# Patient Record
Sex: MALE | Race: BLACK OR AFRICAN AMERICAN | NOT HISPANIC OR LATINO | Employment: FULL TIME | URBAN - METROPOLITAN AREA
[De-identification: names, ages, dates, MRNs, and addresses within clinical notes are randomized per-mention and may not be internally consistent; named-entity substitution may affect disease eponyms.]

---

## 2017-10-21 ENCOUNTER — HOSPITAL ENCOUNTER (EMERGENCY)
Facility: HOSPITAL | Age: 47
Discharge: HOME | End: 2017-10-21
Attending: EMERGENCY MEDICINE
Payer: COMMERCIAL

## 2017-10-21 VITALS
BODY MASS INDEX: 34.98 KG/M2 | HEIGHT: 75 IN | OXYGEN SATURATION: 100 % | RESPIRATION RATE: 18 BRPM | SYSTOLIC BLOOD PRESSURE: 142 MMHG | WEIGHT: 281.31 LBS | DIASTOLIC BLOOD PRESSURE: 83 MMHG | TEMPERATURE: 98 F | HEART RATE: 81 BPM

## 2017-10-21 DIAGNOSIS — S80.812A ABRASION OF LEFT CALF, INITIAL ENCOUNTER: ICD-10-CM

## 2017-10-21 DIAGNOSIS — S90.812A ABRASION OF LEFT FOOT, INITIAL ENCOUNTER: Primary | ICD-10-CM

## 2017-10-21 PROCEDURE — 25000003 PHARM REV CODE 250: Performed by: EMERGENCY MEDICINE

## 2017-10-21 PROCEDURE — 99283 EMERGENCY DEPT VISIT LOW MDM: CPT

## 2017-10-21 RX ORDER — MUPIROCIN 20 MG/G
OINTMENT TOPICAL 3 TIMES DAILY
Qty: 1 TUBE | Refills: 0 | Status: SHIPPED | OUTPATIENT
Start: 2017-10-21 | End: 2017-10-31

## 2017-10-21 RX ORDER — LOSARTAN POTASSIUM AND HYDROCHLOROTHIAZIDE 12.5; 5 MG/1; MG/1
1 TABLET ORAL DAILY
COMMUNITY
Start: 2017-10-03 | End: 2018-10-03

## 2017-10-21 RX ORDER — GLIPIZIDE 5 MG/1
5 TABLET, FILM COATED, EXTENDED RELEASE ORAL DAILY
COMMUNITY
Start: 2017-09-01

## 2017-10-21 RX ORDER — PRAVASTATIN SODIUM 20 MG/1
20 TABLET ORAL DAILY
COMMUNITY
Start: 2017-02-14

## 2017-10-21 RX ORDER — METFORMIN HYDROCHLORIDE 750 MG/1
1500 TABLET, EXTENDED RELEASE ORAL
Status: ON HOLD | COMMUNITY
Start: 2017-02-14 | End: 2022-11-18 | Stop reason: HOSPADM

## 2017-10-21 RX ORDER — MUPIROCIN 20 MG/G
OINTMENT TOPICAL
Status: COMPLETED | OUTPATIENT
Start: 2017-10-21 | End: 2017-10-21

## 2017-10-21 RX ORDER — CALCIUM CITRATE/VITAMIN D3 315MG-6.25
1 TABLET ORAL DAILY
COMMUNITY

## 2017-10-21 RX ORDER — OMEPRAZOLE 20 MG/1
1 CAPSULE, DELAYED RELEASE ORAL DAILY
COMMUNITY
Start: 2017-07-28

## 2017-10-21 RX ORDER — GABAPENTIN 300 MG/1
400 CAPSULE ORAL NIGHTLY
COMMUNITY
Start: 2017-10-13

## 2017-10-21 RX ADMIN — MUPIROCIN: 20 OINTMENT TOPICAL at 04:10

## 2017-10-21 NOTE — ED PROVIDER NOTES
"SCRIBE #1 NOTE: I, Dana Colorado, am scribing for, and in the presence of, Michael Aguirre Jr., MD. I have scribed the entire note.      History      Chief Complaint   Patient presents with    Foot Injury     pt reports he pulled off some skin on L foot and his foot wont stop bleeding       Review of patient's allergies indicates:   Allergen Reactions    Lisinopril Swelling    Penicillins Other (See Comments)     Pt unsure of reaction, stating "I just know I'm allergic"        HPI   HPI    10/21/2017, 4:12 AM   History obtained from the patient      History of Present Illness: Joseph Paris Jr. is a 47 y.o. male patient with a PMHx of DM who presents to the Emergency Department for LLE injury which onset gradually thirty minutes pta. Symptoms are intermittent and moderate in severity. No mitigating or exacerbating factors reported. No associated sxs reported. Patient denies any fever, chills, n/v, SOB, extremity weakness/numbness, and all other sxs at this time. No prior Tx reported. No further complaints or concerns at this time.         Arrival mode: Personal vehicle    PCP: Thong Hidalgo Jr, MD       Past Medical History:  Past Medical History:   Diagnosis Date    Coronary artery disease     Diabetes mellitus     Esophageal cancer     Hypertension        Past Surgical History:  Past Surgical History:   Procedure Laterality Date    CORONARY ARTERY BYPASS GRAFT      FEMORAL BYPASS           Family History:  Family History   Problem Relation Age of Onset    Heart disease Mother     Stroke Mother     Cancer Father     Diabetes Sister     Diabetes Brother        Social History:  Social History     Social History Main Topics    Smoking status: Former Smoker    Smokeless tobacco: Never Used    Alcohol use Yes    Drug use: No    Sexual activity: Unknown       ROS   Review of Systems   Constitutional: Negative for chills, diaphoresis and fever.   HENT: Negative for sore throat.    Respiratory: " Negative for shortness of breath.    Cardiovascular: Negative for chest pain.   Gastrointestinal: Negative for nausea and vomiting.   Genitourinary: Negative for dysuria.   Musculoskeletal: Negative for back pain.        (+) LLE injury   Skin: Negative for rash.   Neurological: Negative for weakness and numbness.   Hematological: Does not bruise/bleed easily.   All other systems reviewed and are negative.    Physical Exam      Initial Vitals [10/21/17 0356]   BP Pulse Resp Temp SpO2   (!) 142/95 86 20 98 °F (36.7 °C) 98 %      MAP       110.67          Physical Exam  Nursing Notes and Vital Signs Reviewed.  Constitutional: Patient is in no acute distress. Well-developed and well-nourished.  Head: Atraumatic. Normocephalic.  Eyes: PERRL. EOM intact. Conjunctivae are not pale. No scleral icterus.  ENT: Mucous membranes are moist. Oropharynx is clear and symmetric.    Neck: Supple. Full ROM. No lymphadenopathy.  Cardiovascular: Regular rate. Regular rhythm. No murmurs, rubs, or gallops. Distal pulses are 2+ and symmetric.  Pulmonary/Chest: No respiratory distress. Clear to auscultation bilaterally. No wheezing, rales, or rhonchi.  Abdominal: Soft and non-distended.  There is no tenderness.  No rebound, guarding, or rigidity. Good bowel sounds.  Genitourinary: No CVA tenderness  Musculoskeletal: Moves all extremities. No obvious deformities. No edema. No calf tenderness.  Skin: Warm and dry. Multiple superficial abrasions noted to great toe/sole of L foot. No active bleeding. Abrasions noted to the anterior aspect of LLE secondary to scratching. No areas of edema. No palpable areas of fluctuance.   Neurological:  Alert, awake, and appropriate.  Normal speech.  No acute focal neurological deficits are appreciated.  Psychiatric: Normal affect. Good eye contact. Appropriate in content.    ED Course    Procedures  ED Vital Signs:  Vitals:    10/21/17 0356 10/21/17 0501   BP: (!) 142/95 (!) 142/83   Pulse: 86 81   Resp: 20  "18   Temp: 98 °F (36.7 °C) 98.1 °F (36.7 °C)   TempSrc: Oral Oral   SpO2: 98% 100%   Weight: 127.6 kg (281 lb 4.9 oz)    Height: 6' 3" (1.905 m)             The Emergency Provider reviewed the vital signs and test results, which are outlined above.    ED Discussion     4:30 AM: Reassessed pt at this time.  Pt states his condition has improved at this time. Discussed with pt all pertinent ED information and results. Discussed pt dx and plan of tx. Gave pt all f/u and return to the ED instructions. All questions and concerns were addressed at this time. Pt expresses understanding of information and instructions, and is comfortable with plan to discharge. Pt is stable for discharge.    I discussed wound care precautions with patient and/or family/caretaker; specifically that all wounds have risk of infection despite efforts to cleanse and debride the wound; and there is a risk of an occult foreign body (and thus increased risk of infection) despite a negative examination.  I discussed with patient need to return for any signs of infection, specifically redness, increased pain, fever, drainage of pus, or any concern, immediately.      ED Medication(s):  Medications   mupirocin 2 % ointment ( Topical (Top) Given 10/21/17 0453)       Discharge Medication List as of 10/21/2017  4:33 AM      START taking these medications    Details   mupirocin (BACTROBAN) 2 % ointment Apply topically 3 (three) times daily. Apply to affected area, Starting Sat 10/21/2017, Until Tue 10/31/2017, Print             Follow-up Information     Thong Hidalgo Jr, MD. Schedule an appointment as soon as possible for a visit in 1 week.    Specialty:  Internal Medicine  Contact information:  [address]  [address]  [facility] [address] [phone]             [facility] - .    Specialty:  Emergency Medicine  Why:  As needed, If symptoms worsen  Contact information:  [address]  [facility] " [address]  [phone]                   Medical Decision Making              Scribe Attestation:   Scribe #1: I performed the above scribed service and the documentation accurately describes the services I performed. I attest to the accuracy of the note.    Attending:   Physician Attestation Statement for Scribe #1: I, Michael Aguirre Jr., MD, personally performed the services described in this documentation, as scribed by Dana Colorado, in my presence, and it is both accurate and complete.          Clinical Impression       ICD-10-CM ICD-9-CM   1. Abrasion of left foot, initial encounter S90.812A 917.0   2. Abrasion of left calf, initial encounter S80.812A 916.0       Disposition:   Disposition: Discharged  Condition: Stable         Michael Aguirre Jr., MD  10/22/17 0255

## 2017-10-21 NOTE — DISCHARGE INSTRUCTIONS
I discussed wound care precautions; specifically, that all wounds have risk of infection despite efforts to cleanse and debride the wound; and there is a risk of an occult foreign body (and thus increased risk of infection) despite a negative examination.  I discussed with patient need to return for any signs of infection, specifically redness, increased pain, fever, drainage of pus, or any concern, immediately.

## 2017-10-21 NOTE — ED NOTES
Wounds cleaned, ointment placed, and wounds bandaged. Wound care taught to pt, pt verbalized and demonstrated understanding.

## 2018-07-20 ENCOUNTER — HOSPITAL ENCOUNTER (EMERGENCY)
Facility: HOSPITAL | Age: 48
Discharge: HOME | End: 2018-07-20
Payer: COMMERCIAL

## 2018-07-20 VITALS
WEIGHT: 305.44 LBS | SYSTOLIC BLOOD PRESSURE: 144 MMHG | TEMPERATURE: 98 F | HEART RATE: 88 BPM | HEIGHT: 76 IN | RESPIRATION RATE: 20 BRPM | OXYGEN SATURATION: 96 % | DIASTOLIC BLOOD PRESSURE: 78 MMHG | BODY MASS INDEX: 37.2 KG/M2

## 2018-07-20 DIAGNOSIS — L02.212 CUTANEOUS ABSCESS OF BACK EXCLUDING BUTTOCKS: Primary | ICD-10-CM

## 2018-07-20 PROCEDURE — 10060 I&D ABSCESS SIMPLE/SINGLE: CPT

## 2018-07-20 PROCEDURE — 99283 EMERGENCY DEPT VISIT LOW MDM: CPT | Mod: 25

## 2018-07-20 PROCEDURE — 25000003 PHARM REV CODE 250: Performed by: PHYSICIAN ASSISTANT

## 2018-07-20 RX ORDER — CEFUROXIME AXETIL 500 MG/1
500 TABLET ORAL 2 TIMES DAILY
Qty: 14 TABLET | Refills: 0 | Status: SHIPPED | OUTPATIENT
Start: 2018-07-20 | End: 2018-07-30

## 2018-07-20 RX ORDER — SULFAMETHOXAZOLE AND TRIMETHOPRIM 800; 160 MG/1; MG/1
2 TABLET ORAL 2 TIMES DAILY
Qty: 40 TABLET | Refills: 0 | Status: SHIPPED | OUTPATIENT
Start: 2018-07-20 | End: 2018-07-30

## 2018-07-20 RX ORDER — LIDOCAINE HYDROCHLORIDE 10 MG/ML
10 INJECTION, SOLUTION EPIDURAL; INFILTRATION; INTRACAUDAL; PERINEURAL
Status: COMPLETED | OUTPATIENT
Start: 2018-07-20 | End: 2018-07-20

## 2018-07-20 RX ORDER — LIDOCAINE HYDROCHLORIDE AND EPINEPHRINE 10; 10 MG/ML; UG/ML
10 INJECTION, SOLUTION INFILTRATION; PERINEURAL
Status: COMPLETED | OUTPATIENT
Start: 2018-07-20 | End: 2018-07-20

## 2018-07-20 RX ADMIN — LIDOCAINE HYDROCHLORIDE,EPINEPHRINE BITARTRATE 10 ML: 10; .01 INJECTION, SOLUTION INFILTRATION; PERINEURAL at 09:07

## 2018-07-20 RX ADMIN — LIDOCAINE HYDROCHLORIDE 100 MG: 10 INJECTION, SOLUTION EPIDURAL; INFILTRATION; INTRACAUDAL; PERINEURAL at 09:07

## 2018-07-20 RX ADMIN — NEOMYCIN AND POLYMYXIN B SULFATES AND BACITRACIN ZINC: 400; 3.5; 5 OINTMENT TOPICAL at 09:07

## 2018-07-21 NOTE — ED PROVIDER NOTES
"SCRIBE #1 NOTE: I, Aida Vazquez, am scribing for, and in the presence of, ALBINO Toscano. I have scribed the entire note.      History      Chief Complaint   Patient presents with    Abscess     sacral       Review of patient's allergies indicates:   Allergen Reactions    Lisinopril Swelling    Penicillins Other (See Comments)     Pt unsure of reaction, stating "I just know I'm allergic"        HPI   HPI    7/20/2018, 9:32 PM   History obtained from the patient      History of Present Illness: Joseph Paris Jr. is a 47 y.o. male patient who presents to the Emergency Department for abscess to sacral which onset gradually 3-4 days ago. Symptoms are constant and moderate in severity. Pt denies having similar sxs in the past. No mitigating or exacerbating factors reported. No associated sxs at this time. Patient denies any fever, chills, n/v/d, abd pain, CP, SOB, and all other sxs at this time. No prior Tx. No further complaints or concerns at this time.         Arrival mode: Personal vehicle     PCP: Thong Hidalgo Jr, MD       Past Medical History:  Past Medical History:   Diagnosis Date    Coronary artery disease     Diabetes mellitus     Esophageal cancer     Hypertension        Past Surgical History:  Past Surgical History:   Procedure Laterality Date    CORONARY ARTERY BYPASS GRAFT      FEMORAL BYPASS           Family History:  Family History   Problem Relation Age of Onset    Heart disease Mother     Stroke Mother     Cancer Father     Diabetes Sister     Diabetes Brother        Social History:  Social History     Social History Main Topics    Smoking status: Former Smoker    Smokeless tobacco: Never Used    Alcohol use Yes    Drug use: No    Sexual activity: Not given       ROS   Review of Systems   Constitutional: Negative for chills and fever.   HENT: Negative for sore throat.    Respiratory: Negative for shortness of breath.    Cardiovascular: Negative for chest pain. "   Gastrointestinal: Negative for abdominal pain, diarrhea, nausea and vomiting.   Genitourinary: Negative for dysuria.   Musculoskeletal: Negative for back pain and neck pain.   Skin: Negative for rash.        (+) Abscess to sacral area   Neurological: Negative for dizziness, syncope, weakness, numbness and headaches.   Hematological: Does not bruise/bleed easily.   All other systems reviewed and are negative.      Physical Exam      Initial Vitals [07/20/18 2052]   BP Pulse Resp Temp SpO2   (!) 144/78 88 20 98.1 °F (36.7 °C) 96 %      MAP       --          Physical Exam  Nursing Notes and Vital Signs Reviewed.  Constitutional: Patient is in no acute distress. Well-developed and well-nourished.  Head: Atraumatic. Normocephalic.  Eyes: PERRL. EOM intact. Conjunctivae are not pale. No scleral icterus.  ENT: Mucous membranes are moist. Oropharynx is clear and symmetric.    Neck: Supple. Full ROM. No lymphadenopathy.  Cardiovascular: Regular rate. Regular rhythm. No murmurs, rubs, or gallops. Distal pulses are 2+ and symmetric.  Pulmonary/Chest: No respiratory distress. Clear to auscultation bilaterally. No wheezing or rales.  Abdominal: Soft and non-distended.  There is no tenderness.  No rebound, guarding, or rigidity. Good bowel sounds.  Genitourinary: No CVA tenderness  Musculoskeletal: Moves all extremities. No obvious deformities. No edema. No calf tenderness.  Skin: Warm and dry. Tender swollen area of induration and fluctuance. 6cm superior portion of R gluteal fold.  Neurological:  Alert, awake, and appropriate.  Normal speech.  No acute focal neurological deficits are appreciated.  Psychiatric: Normal affect. Good eye contact. Appropriate in content.    ED Course    I & D - Incision and Drainage  Date/Time: 7/20/2018 9:56 PM  Performed by: HOWARD COMER  Authorized by: CHRIST LIU   Type: abscess  Body area: anogenital  Location details: gluteal cleft    Anesthesia:  Local Anesthetic: lidocaine 1%  "with epinephrine  Anesthetic total: 8 mL  Scalpel size: 11  Incision type: single straight  Complexity: simple  Drainage: purulent  Drainage amount: copious  Wound treatment: incision,  drainage,  expression of material and  wound packed  Packing material: 1/2 in iodoform gauze  Complications: No  Patient tolerance: Patient tolerated the procedure well with no immediate complications        ED Vital Signs:  Vitals:    07/20/18 2052   BP: (!) 144/78   Pulse: 88   Resp: 20   Temp: 98.1 °F (36.7 °C)   TempSrc: Oral   SpO2: 96%   Weight: (!) 138.6 kg (305 lb 7.2 oz)   Height: 6' 4" (1.93 m)            The Emergency Provider reviewed the vital signs and test results, which are outlined above.    ED Discussion     10:01 PM: Reassessed pt at this time. Discussed with pt all pertinent ED information and results. Discussed pt dx and plan of tx. Gave pt all f/u and return to the ED instructions. All questions and concerns were addressed at this time. Pt expresses understanding of information and instructions, and is comfortable with plan to discharge. Pt is stable for discharge.    I discussed wound care precautions with patient and/or family/caretaker; specifically that all wounds have risk of infection despite efforts to cleanse and debride the wound; and there is a risk of an occult foreign body (and thus increased risk of infection) despite a negative examination.  I discussed with patient need to return for any signs of infection, specifically redness, increased pain, fever, drainage of pus, or any concern, immediately.      ED Medication(s):  Medications   lidocaine (PF) 10 mg/ml (1%) injection 100 mg (not administered)   lidocaine-EPINEPHrine 1%-1:100,000 injection 10 mL (not administered)   neomycin-bacitracnZn-polymyxnB packet ( Topical (Top) Given 7/20/18 2145)       New Prescriptions    CEFUROXIME (CEFTIN) 500 MG TABLET    Take 1 tablet (500 mg total) by mouth 2 (two) times daily. for 10 days    " SULFAMETHOXAZOLE-TRIMETHOPRIM 800-160MG (BACTRIM DS) 800-160 MG TAB    Take 2 tablets by mouth 2 (two) times daily. for 10 days       Follow-up Information     [facility] -  In 2 days.    Specialty:  Emergency Medicine  Why:  For wound re-check and packing removal  Contact information:  [address]  [facility] [address] [phone]                   Medical Decision Making              Scribe Attestation:   Scribe #1: I performed the above scribed service and the documentation accurately describes the services I performed. I attest to the accuracy of the note.    Attending:   Physician Attestation Statement for Scribe #1: I, ALBINO Toscano, personally performed the services described in this documentation, as scribed by Aida Vazquez, in my presence, and it is both accurate and complete.          Clinical Impression       ICD-10-CM ICD-9-CM   1. Cutaneous abscess of back excluding buttocks L02.212 682.2       Disposition:   Disposition: Discharged  Condition: Stable         ALBINO Lee  07/20/18 2209

## 2022-11-12 ENCOUNTER — HOSPITAL ENCOUNTER (INPATIENT)
Facility: HOSPITAL | Age: 52
LOS: 17 days | Discharge: HOME | DRG: 853 | End: 2022-11-29
Attending: EMERGENCY MEDICINE | Admitting: INTERNAL MEDICINE
Payer: MEDICAID

## 2022-11-12 DIAGNOSIS — R07.9 CHEST PAIN: ICD-10-CM

## 2022-11-12 DIAGNOSIS — E11.42 TYPE 2 DIABETES MELLITUS WITH DIABETIC POLYNEUROPATHY, WITHOUT LONG-TERM CURRENT USE OF INSULIN: ICD-10-CM

## 2022-11-12 DIAGNOSIS — I73.9 PVD (PERIPHERAL VASCULAR DISEASE): ICD-10-CM

## 2022-11-12 DIAGNOSIS — L08.9 LEFT FOOT INFECTION: ICD-10-CM

## 2022-11-12 DIAGNOSIS — A48.0 GAS GANGRENE OF FOOT: ICD-10-CM

## 2022-11-12 DIAGNOSIS — M86.172 ACUTE OSTEOMYELITIS OF LEFT FOOT: Primary | ICD-10-CM

## 2022-11-12 DIAGNOSIS — L02.612 ABSCESS OF FOOT INCLUDING TOES, LEFT: ICD-10-CM

## 2022-11-12 PROBLEM — R65.20 SEVERE SEPSIS: Status: ACTIVE | Noted: 2022-11-12

## 2022-11-12 PROBLEM — N17.9 AKI (ACUTE KIDNEY INJURY): Status: ACTIVE | Noted: 2022-11-12

## 2022-11-12 PROBLEM — A41.9 SEVERE SEPSIS: Status: ACTIVE | Noted: 2022-11-12

## 2022-11-12 LAB
ALBUMIN SERPL BCP-MCNC: 2.5 G/DL (ref 3.5–5.2)
ALP SERPL-CCNC: 260 U/L (ref 55–135)
ALT SERPL W/O P-5'-P-CCNC: 12 U/L (ref 10–44)
ANION GAP SERPL CALC-SCNC: 17 MMOL/L (ref 8–16)
AST SERPL-CCNC: 43 U/L (ref 10–40)
BASOPHILS # BLD AUTO: 0.08 K/UL (ref 0–0.2)
BASOPHILS NFR BLD: 0.3 % (ref 0–1.9)
BILIRUB SERPL-MCNC: 1.9 MG/DL (ref 0.1–1)
BUN SERPL-MCNC: 27 MG/DL (ref 6–20)
CALCIUM SERPL-MCNC: 9.6 MG/DL (ref 8.7–10.5)
CHLORIDE SERPL-SCNC: 91 MMOL/L (ref 95–110)
CO2 SERPL-SCNC: 23 MMOL/L (ref 23–29)
CREAT SERPL-MCNC: 2 MG/DL (ref 0.5–1.4)
CRP SERPL-MCNC: 471.2 MG/L (ref 0–8.2)
DIFFERENTIAL METHOD: ABNORMAL
EOSINOPHIL # BLD AUTO: 0 K/UL (ref 0–0.5)
EOSINOPHIL NFR BLD: 0.1 % (ref 0–8)
ERYTHROCYTE [DISTWIDTH] IN BLOOD BY AUTOMATED COUNT: 10.9 % (ref 11.5–14.5)
ERYTHROCYTE [SEDIMENTATION RATE] IN BLOOD BY WESTERGREN METHOD: 125 MM/HR (ref 0–10)
EST. GFR  (NO RACE VARIABLE): 39 ML/MIN/1.73 M^2
GLUCOSE SERPL-MCNC: 267 MG/DL (ref 70–110)
HCT VFR BLD AUTO: 34.2 % (ref 40–54)
HGB BLD-MCNC: 11.6 G/DL (ref 14–18)
HYPOCHROMIA BLD QL SMEAR: ABNORMAL
IMM GRANULOCYTES # BLD AUTO: 0.21 K/UL (ref 0–0.04)
IMM GRANULOCYTES NFR BLD AUTO: 0.9 % (ref 0–0.5)
INFLUENZA A, MOLECULAR: NEGATIVE
INFLUENZA B, MOLECULAR: NEGATIVE
LACTATE SERPL-SCNC: 1.9 MMOL/L (ref 0.5–2.2)
LYMPHOCYTES # BLD AUTO: 1.5 K/UL (ref 1–4.8)
LYMPHOCYTES NFR BLD: 6.7 % (ref 18–48)
MCH RBC QN AUTO: 31.7 PG (ref 27–31)
MCHC RBC AUTO-ENTMCNC: 33.9 G/DL (ref 32–36)
MCV RBC AUTO: 93 FL (ref 82–98)
MONOCYTES # BLD AUTO: 1.4 K/UL (ref 0.3–1)
MONOCYTES NFR BLD: 6 % (ref 4–15)
NEUTROPHILS # BLD AUTO: 19.8 K/UL (ref 1.8–7.7)
NEUTROPHILS NFR BLD: 86 % (ref 38–73)
NRBC BLD-RTO: 0 /100 WBC
OVALOCYTES BLD QL SMEAR: ABNORMAL
PLATELET # BLD AUTO: 378 K/UL (ref 150–450)
PMV BLD AUTO: 9.8 FL (ref 9.2–12.9)
POCT GLUCOSE: 261 MG/DL (ref 70–110)
POCT GLUCOSE: 377 MG/DL (ref 70–110)
POIKILOCYTOSIS BLD QL SMEAR: SLIGHT
POTASSIUM SERPL-SCNC: 4.2 MMOL/L (ref 3.5–5.1)
PROCALCITONIN SERPL IA-MCNC: 2.69 NG/ML
PROT SERPL-MCNC: 9 G/DL (ref 6–8.4)
RBC # BLD AUTO: 3.66 M/UL (ref 4.6–6.2)
SARS-COV-2 RDRP RESP QL NAA+PROBE: NEGATIVE
SODIUM SERPL-SCNC: 131 MMOL/L (ref 136–145)
SPECIMEN SOURCE: NORMAL
TOXIC GRANULES BLD QL SMEAR: PRESENT
WBC # BLD AUTO: 22.99 K/UL (ref 3.9–12.7)

## 2022-11-12 PROCEDURE — 87502 INFLUENZA DNA AMP PROBE: CPT

## 2022-11-12 PROCEDURE — 87040 BLOOD CULTURE FOR BACTERIA: CPT | Performed by: EMERGENCY MEDICINE

## 2022-11-12 PROCEDURE — G0378 HOSPITAL OBSERVATION PER HR: HCPCS

## 2022-11-12 PROCEDURE — 99285 EMERGENCY DEPT VISIT HI MDM: CPT | Mod: 25

## 2022-11-12 PROCEDURE — 83605 ASSAY OF LACTIC ACID: CPT | Performed by: PHYSICIAN ASSISTANT

## 2022-11-12 PROCEDURE — 96365 THER/PROPH/DIAG IV INF INIT: CPT

## 2022-11-12 PROCEDURE — 86140 C-REACTIVE PROTEIN: CPT | Performed by: EMERGENCY MEDICINE

## 2022-11-12 PROCEDURE — 25000003 PHARM REV CODE 250: Performed by: STUDENT IN AN ORGANIZED HEALTH CARE EDUCATION/TRAINING PROGRAM

## 2022-11-12 PROCEDURE — 25000003 PHARM REV CODE 250: Performed by: EMERGENCY MEDICINE

## 2022-11-12 PROCEDURE — U0002 COVID-19 LAB TEST NON-CDC: HCPCS | Performed by: EMERGENCY MEDICINE

## 2022-11-12 PROCEDURE — 84145 PROCALCITONIN (PCT): CPT | Performed by: EMERGENCY MEDICINE

## 2022-11-12 PROCEDURE — 85025 COMPLETE CBC W/AUTO DIFF WBC: CPT | Performed by: PHYSICIAN ASSISTANT

## 2022-11-12 PROCEDURE — 87502 INFLUENZA DNA AMP PROBE: CPT | Performed by: EMERGENCY MEDICINE

## 2022-11-12 PROCEDURE — 85651 RBC SED RATE NONAUTOMATED: CPT | Performed by: EMERGENCY MEDICINE

## 2022-11-12 PROCEDURE — 11000001 HC ACUTE MED/SURG PRIVATE ROOM

## 2022-11-12 PROCEDURE — 63600175 PHARM REV CODE 636 W HCPCS: Performed by: STUDENT IN AN ORGANIZED HEALTH CARE EDUCATION/TRAINING PROGRAM

## 2022-11-12 PROCEDURE — 87077 CULTURE AEROBIC IDENTIFY: CPT | Performed by: EMERGENCY MEDICINE

## 2022-11-12 PROCEDURE — 63600175 PHARM REV CODE 636 W HCPCS: Performed by: PODIATRIST

## 2022-11-12 PROCEDURE — 80053 COMPREHEN METABOLIC PANEL: CPT | Performed by: PHYSICIAN ASSISTANT

## 2022-11-12 PROCEDURE — 63600175 PHARM REV CODE 636 W HCPCS: Performed by: EMERGENCY MEDICINE

## 2022-11-12 PROCEDURE — 87186 SC STD MICRODIL/AGAR DIL: CPT | Mod: 59 | Performed by: EMERGENCY MEDICINE

## 2022-11-12 PROCEDURE — 25000003 PHARM REV CODE 250: Performed by: PODIATRIST

## 2022-11-12 PROCEDURE — 87147 CULTURE TYPE IMMUNOLOGIC: CPT | Performed by: EMERGENCY MEDICINE

## 2022-11-12 PROCEDURE — 96372 THER/PROPH/DIAG INJ SC/IM: CPT | Performed by: STUDENT IN AN ORGANIZED HEALTH CARE EDUCATION/TRAINING PROGRAM

## 2022-11-12 RX ORDER — ACETAMINOPHEN 325 MG/1
650 TABLET ORAL EVERY 8 HOURS PRN
Status: DISCONTINUED | OUTPATIENT
Start: 2022-11-12 | End: 2022-11-29 | Stop reason: HOSPADM

## 2022-11-12 RX ORDER — ACETAMINOPHEN 500 MG
1000 TABLET ORAL
Status: COMPLETED | OUTPATIENT
Start: 2022-11-12 | End: 2022-11-12

## 2022-11-12 RX ORDER — INSULIN ASPART 100 [IU]/ML
1-10 INJECTION, SOLUTION INTRAVENOUS; SUBCUTANEOUS
Status: DISCONTINUED | OUTPATIENT
Start: 2022-11-12 | End: 2022-11-15

## 2022-11-12 RX ORDER — SODIUM CHLORIDE 0.9 % (FLUSH) 0.9 %
10 SYRINGE (ML) INJECTION EVERY 12 HOURS PRN
Status: DISCONTINUED | OUTPATIENT
Start: 2022-11-12 | End: 2022-11-29 | Stop reason: HOSPADM

## 2022-11-12 RX ORDER — ONDANSETRON 2 MG/ML
4 INJECTION INTRAMUSCULAR; INTRAVENOUS EVERY 8 HOURS PRN
Status: DISCONTINUED | OUTPATIENT
Start: 2022-11-12 | End: 2022-11-29 | Stop reason: HOSPADM

## 2022-11-12 RX ORDER — GLUCAGON 1 MG
1 KIT INJECTION
Status: DISCONTINUED | OUTPATIENT
Start: 2022-11-12 | End: 2022-11-29 | Stop reason: HOSPADM

## 2022-11-12 RX ORDER — CEFEPIME HYDROCHLORIDE 1 G/50ML
2 INJECTION, SOLUTION INTRAVENOUS
Status: DISCONTINUED | OUTPATIENT
Start: 2022-11-12 | End: 2022-11-13

## 2022-11-12 RX ORDER — IBUPROFEN 200 MG
16 TABLET ORAL
Status: DISCONTINUED | OUTPATIENT
Start: 2022-11-12 | End: 2022-11-29 | Stop reason: HOSPADM

## 2022-11-12 RX ORDER — ACETAMINOPHEN 325 MG/1
650 TABLET ORAL EVERY 4 HOURS PRN
Status: DISCONTINUED | OUTPATIENT
Start: 2022-11-12 | End: 2022-11-15 | Stop reason: SDUPTHER

## 2022-11-12 RX ORDER — NALOXONE HCL 0.4 MG/ML
0.02 VIAL (ML) INJECTION
Status: DISCONTINUED | OUTPATIENT
Start: 2022-11-12 | End: 2022-11-29 | Stop reason: HOSPADM

## 2022-11-12 RX ORDER — SODIUM CHLORIDE, SODIUM LACTATE, POTASSIUM CHLORIDE, CALCIUM CHLORIDE 600; 310; 30; 20 MG/100ML; MG/100ML; MG/100ML; MG/100ML
INJECTION, SOLUTION INTRAVENOUS CONTINUOUS
Status: DISCONTINUED | OUTPATIENT
Start: 2022-11-12 | End: 2022-11-13

## 2022-11-12 RX ORDER — ENOXAPARIN SODIUM 100 MG/ML
40 INJECTION SUBCUTANEOUS EVERY 24 HOURS
Status: DISCONTINUED | OUTPATIENT
Start: 2022-11-13 | End: 2022-11-29 | Stop reason: HOSPADM

## 2022-11-12 RX ORDER — IBUPROFEN 200 MG
24 TABLET ORAL
Status: DISCONTINUED | OUTPATIENT
Start: 2022-11-12 | End: 2022-11-29 | Stop reason: HOSPADM

## 2022-11-12 RX ADMIN — SODIUM CHLORIDE, SODIUM LACTATE, POTASSIUM CHLORIDE, AND CALCIUM CHLORIDE: .6; .31; .03; .02 INJECTION, SOLUTION INTRAVENOUS at 11:11

## 2022-11-12 RX ADMIN — ACETAMINOPHEN 1000 MG: 500 TABLET ORAL at 07:11

## 2022-11-12 RX ADMIN — INSULIN ASPART 5 UNITS: 100 INJECTION, SOLUTION INTRAVENOUS; SUBCUTANEOUS at 11:11

## 2022-11-12 RX ADMIN — INSULIN DETEMIR 10 UNITS: 100 INJECTION, SOLUTION SUBCUTANEOUS at 11:11

## 2022-11-12 RX ADMIN — CEFEPIME HYDROCHLORIDE 2 G: 2 INJECTION, SOLUTION INTRAVENOUS at 08:11

## 2022-11-12 RX ADMIN — VANCOMYCIN HYDROCHLORIDE 2000 MG: 500 INJECTION, POWDER, LYOPHILIZED, FOR SOLUTION INTRAVENOUS at 09:11

## 2022-11-12 NOTE — Clinical Note
Diagnosis: Acute osteomyelitis of left foot [312020]   Future Attending Provider: SIMONE JIMENEZ [[id number]]   Admitting Provider:: SIMONE JIMENEZ [[id number]]

## 2022-11-12 NOTE — Clinical Note
The left DP pulse was non-palpable. The right DP pulse was 1+.  The radial pulses were +2 bilaterally.

## 2022-11-12 NOTE — Clinical Note
An angiography was performed of the proximal, mid and distal left popliteal artery, anterior tibial artery and posterior tibial artery

## 2022-11-12 NOTE — Clinical Note
All catheters were removed.  x2 days Pt c/o sharp left lower abd pain intermittent movement. Denies: radiation, urinary issues, BM issues, N/V/D, fevers    Last BM was 8/27 AM and WNL per Pt.

## 2022-11-12 NOTE — Clinical Note
70 ml of contrast were injected throughout the case. 0 mL of contrast was the total wasted during the case. 70 mL was the total amount used during the case.

## 2022-11-13 ENCOUNTER — ANESTHESIA (OUTPATIENT)
Dept: SURGERY | Facility: HOSPITAL | Age: 52
DRG: 853 | End: 2022-11-13
Payer: MEDICAID

## 2022-11-13 ENCOUNTER — ANESTHESIA EVENT (OUTPATIENT)
Dept: SURGERY | Facility: HOSPITAL | Age: 52
DRG: 853 | End: 2022-11-13
Payer: MEDICAID

## 2022-11-13 PROBLEM — A48.0 GAS GANGRENE OF FOOT: Status: ACTIVE | Noted: 2022-11-13

## 2022-11-13 PROBLEM — L02.612 ABSCESS OF FOOT INCLUDING TOES, LEFT: Status: ACTIVE | Noted: 2022-11-13

## 2022-11-13 PROBLEM — L02.612 ABSCESS OF FOOT INCLUDING TOES, LEFT: Status: RESOLVED | Noted: 2022-11-13 | Resolved: 2022-11-13

## 2022-11-13 LAB
ANION GAP SERPL CALC-SCNC: 19 MMOL/L (ref 8–16)
BASOPHILS # BLD AUTO: 0.07 K/UL (ref 0–0.2)
BASOPHILS NFR BLD: 0.3 % (ref 0–1.9)
BUN SERPL-MCNC: 28 MG/DL (ref 6–20)
CALCIUM SERPL-MCNC: 8.6 MG/DL (ref 8.7–10.5)
CHLORIDE SERPL-SCNC: 89 MMOL/L (ref 95–110)
CO2 SERPL-SCNC: 24 MMOL/L (ref 23–29)
CREAT SERPL-MCNC: 1.7 MG/DL (ref 0.5–1.4)
DIFFERENTIAL METHOD: ABNORMAL
EOSINOPHIL # BLD AUTO: 0.1 K/UL (ref 0–0.5)
EOSINOPHIL NFR BLD: 0.4 % (ref 0–8)
ERYTHROCYTE [DISTWIDTH] IN BLOOD BY AUTOMATED COUNT: 11 % (ref 11.5–14.5)
EST. GFR  (NO RACE VARIABLE): 47 ML/MIN/1.73 M^2
GLUCOSE SERPL-MCNC: 245 MG/DL (ref 70–110)
GRAM STN SPEC: NORMAL
HCT VFR BLD AUTO: 32.6 % (ref 40–54)
HGB BLD-MCNC: 11 G/DL (ref 14–18)
IMM GRANULOCYTES # BLD AUTO: 0.15 K/UL (ref 0–0.04)
IMM GRANULOCYTES NFR BLD AUTO: 0.7 % (ref 0–0.5)
LYMPHOCYTES # BLD AUTO: 1.5 K/UL (ref 1–4.8)
LYMPHOCYTES NFR BLD: 6.9 % (ref 18–48)
MCH RBC QN AUTO: 31.8 PG (ref 27–31)
MCHC RBC AUTO-ENTMCNC: 33.7 G/DL (ref 32–36)
MCV RBC AUTO: 94 FL (ref 82–98)
MONOCYTES # BLD AUTO: 1.7 K/UL (ref 0.3–1)
MONOCYTES NFR BLD: 7.7 % (ref 4–15)
NEUTROPHILS # BLD AUTO: 18.8 K/UL (ref 1.8–7.7)
NEUTROPHILS NFR BLD: 84 % (ref 38–73)
NRBC BLD-RTO: 0 /100 WBC
PLATELET # BLD AUTO: 367 K/UL (ref 150–450)
PMV BLD AUTO: 10.4 FL (ref 9.2–12.9)
POCT GLUCOSE: 194 MG/DL (ref 70–110)
POCT GLUCOSE: 246 MG/DL (ref 70–110)
POCT GLUCOSE: 273 MG/DL (ref 70–110)
POCT GLUCOSE: 377 MG/DL (ref 70–110)
POTASSIUM SERPL-SCNC: 4.2 MMOL/L (ref 3.5–5.1)
RBC # BLD AUTO: 3.46 M/UL (ref 4.6–6.2)
SODIUM SERPL-SCNC: 132 MMOL/L (ref 136–145)
VANCOMYCIN SERPL-MCNC: 14.7 UG/ML
WBC # BLD AUTO: 22.3 K/UL (ref 3.9–12.7)

## 2022-11-13 PROCEDURE — 87070 CULTURE OTHR SPECIMN AEROBIC: CPT | Performed by: INTERNAL MEDICINE

## 2022-11-13 PROCEDURE — 99223 1ST HOSP IP/OBS HIGH 75: CPT | Mod: NSCH,,, | Performed by: INTERNAL MEDICINE

## 2022-11-13 PROCEDURE — 87075 CULTR BACTERIA EXCEPT BLOOD: CPT | Performed by: INTERNAL MEDICINE

## 2022-11-13 PROCEDURE — 87102 FUNGUS ISOLATION CULTURE: CPT | Performed by: INTERNAL MEDICINE

## 2022-11-13 PROCEDURE — 36000705 HC OR TIME LEV I EA ADD 15 MIN: Performed by: PODIATRIST

## 2022-11-13 PROCEDURE — A9585 GADOBUTROL INJECTION: HCPCS | Performed by: INTERNAL MEDICINE

## 2022-11-13 PROCEDURE — 25500020 PHARM REV CODE 255: Performed by: INTERNAL MEDICINE

## 2022-11-13 PROCEDURE — 36415 COLL VENOUS BLD VENIPUNCTURE: CPT | Performed by: STUDENT IN AN ORGANIZED HEALTH CARE EDUCATION/TRAINING PROGRAM

## 2022-11-13 PROCEDURE — 21400001 HC TELEMETRY ROOM

## 2022-11-13 PROCEDURE — 37000009 HC ANESTHESIA EA ADD 15 MINS: Performed by: PODIATRIST

## 2022-11-13 PROCEDURE — 85025 COMPLETE CBC W/AUTO DIFF WBC: CPT | Performed by: STUDENT IN AN ORGANIZED HEALTH CARE EDUCATION/TRAINING PROGRAM

## 2022-11-13 PROCEDURE — 25000003 PHARM REV CODE 250: Performed by: ANESTHESIOLOGY

## 2022-11-13 PROCEDURE — 27201423 OPTIME MED/SURG SUP & DEVICES STERILE SUPPLY: Performed by: PODIATRIST

## 2022-11-13 PROCEDURE — 87015 SPECIMEN INFECT AGNT CONCNTJ: CPT | Performed by: INTERNAL MEDICINE

## 2022-11-13 PROCEDURE — 63600175 PHARM REV CODE 636 W HCPCS: Performed by: EMERGENCY MEDICINE

## 2022-11-13 PROCEDURE — 87116 MYCOBACTERIA CULTURE: CPT | Performed by: INTERNAL MEDICINE

## 2022-11-13 PROCEDURE — 28003 PR DEEP DISSEC FOOT INFEC,MULTIPLE: ICD-10-PCS | Mod: LT,,, | Performed by: PODIATRIST

## 2022-11-13 PROCEDURE — 87205 SMEAR GRAM STAIN: CPT | Performed by: INTERNAL MEDICINE

## 2022-11-13 PROCEDURE — 36000704 HC OR TIME LEV I 1ST 15 MIN: Performed by: PODIATRIST

## 2022-11-13 PROCEDURE — 11000001 HC ACUTE MED/SURG PRIVATE ROOM

## 2022-11-13 PROCEDURE — 63600175 PHARM REV CODE 636 W HCPCS: Performed by: ANESTHESIOLOGY

## 2022-11-13 PROCEDURE — 99223 PR INITIAL HOSPITAL CARE,LEVL III: ICD-10-PCS | Mod: NSCH,,, | Performed by: INTERNAL MEDICINE

## 2022-11-13 PROCEDURE — 25000003 PHARM REV CODE 250: Performed by: PODIATRIST

## 2022-11-13 PROCEDURE — 28003 TREATMENT OF FOOT INFECTION: CPT | Mod: LT,,, | Performed by: PODIATRIST

## 2022-11-13 PROCEDURE — 37000008 HC ANESTHESIA 1ST 15 MINUTES: Performed by: PODIATRIST

## 2022-11-13 PROCEDURE — 99223 1ST HOSP IP/OBS HIGH 75: CPT | Mod: 25,,, | Performed by: PODIATRIST

## 2022-11-13 PROCEDURE — 36415 COLL VENOUS BLD VENIPUNCTURE: CPT | Performed by: INTERNAL MEDICINE

## 2022-11-13 PROCEDURE — 80048 BASIC METABOLIC PNL TOTAL CA: CPT | Performed by: STUDENT IN AN ORGANIZED HEALTH CARE EDUCATION/TRAINING PROGRAM

## 2022-11-13 PROCEDURE — 99223 PR INITIAL HOSPITAL CARE,LEVL III: ICD-10-PCS | Mod: 25,,, | Performed by: PODIATRIST

## 2022-11-13 PROCEDURE — 25000003 PHARM REV CODE 250: Performed by: STUDENT IN AN ORGANIZED HEALTH CARE EDUCATION/TRAINING PROGRAM

## 2022-11-13 PROCEDURE — 71000033 HC RECOVERY, INTIAL HOUR: Performed by: PODIATRIST

## 2022-11-13 PROCEDURE — 87206 SMEAR FLUORESCENT/ACID STAI: CPT | Performed by: INTERNAL MEDICINE

## 2022-11-13 PROCEDURE — 80202 ASSAY OF VANCOMYCIN: CPT | Performed by: INTERNAL MEDICINE

## 2022-11-13 PROCEDURE — 63600175 PHARM REV CODE 636 W HCPCS: Performed by: PODIATRIST

## 2022-11-13 RX ORDER — HYDRALAZINE HYDROCHLORIDE 20 MG/ML
10 INJECTION INTRAMUSCULAR; INTRAVENOUS EVERY 6 HOURS PRN
Status: DISCONTINUED | OUTPATIENT
Start: 2022-11-13 | End: 2022-11-29 | Stop reason: HOSPADM

## 2022-11-13 RX ORDER — FENTANYL CITRATE 50 UG/ML
INJECTION, SOLUTION INTRAMUSCULAR; INTRAVENOUS
Status: DISCONTINUED | OUTPATIENT
Start: 2022-11-13 | End: 2022-11-13

## 2022-11-13 RX ORDER — HYDROMORPHONE HYDROCHLORIDE 2 MG/ML
0.2 INJECTION, SOLUTION INTRAMUSCULAR; INTRAVENOUS; SUBCUTANEOUS EVERY 5 MIN PRN
Status: DISCONTINUED | OUTPATIENT
Start: 2022-11-13 | End: 2022-11-15 | Stop reason: HOSPADM

## 2022-11-13 RX ORDER — AMLODIPINE BESYLATE 5 MG/1
5 TABLET ORAL DAILY
Status: DISCONTINUED | OUTPATIENT
Start: 2022-11-13 | End: 2022-11-29 | Stop reason: HOSPADM

## 2022-11-13 RX ORDER — LIDOCAINE HCL/PF 100 MG/5ML
SYRINGE (ML) INTRAVENOUS
Status: DISCONTINUED | OUTPATIENT
Start: 2022-11-13 | End: 2022-11-13

## 2022-11-13 RX ORDER — GADOBUTROL 604.72 MG/ML
10 INJECTION INTRAVENOUS
Status: COMPLETED | OUTPATIENT
Start: 2022-11-13 | End: 2022-11-13

## 2022-11-13 RX ORDER — PROPOFOL 10 MG/ML
INJECTION, EMULSION INTRAVENOUS
Status: DISCONTINUED | OUTPATIENT
Start: 2022-11-13 | End: 2022-11-13

## 2022-11-13 RX ORDER — BUPIVACAINE HYDROCHLORIDE 5 MG/ML
INJECTION, SOLUTION EPIDURAL; INTRACAUDAL
Status: DISCONTINUED | OUTPATIENT
Start: 2022-11-13 | End: 2022-11-13 | Stop reason: HOSPADM

## 2022-11-13 RX ORDER — VANCOMYCIN HYDROCHLORIDE 1 G/20ML
INJECTION, POWDER, LYOPHILIZED, FOR SOLUTION INTRAVENOUS
Status: DISCONTINUED | OUTPATIENT
Start: 2022-11-13 | End: 2022-11-13 | Stop reason: HOSPADM

## 2022-11-13 RX ORDER — CEFEPIME HYDROCHLORIDE 1 G/50ML
2 INJECTION, SOLUTION INTRAVENOUS
Status: DISCONTINUED | OUTPATIENT
Start: 2022-11-13 | End: 2022-11-16

## 2022-11-13 RX ORDER — SODIUM CHLORIDE, SODIUM LACTATE, POTASSIUM CHLORIDE, CALCIUM CHLORIDE 600; 310; 30; 20 MG/100ML; MG/100ML; MG/100ML; MG/100ML
INJECTION, SOLUTION INTRAVENOUS CONTINUOUS PRN
Status: DISCONTINUED | OUTPATIENT
Start: 2022-11-13 | End: 2022-11-13

## 2022-11-13 RX ORDER — MIDAZOLAM HYDROCHLORIDE 1 MG/ML
INJECTION INTRAMUSCULAR; INTRAVENOUS
Status: DISCONTINUED | OUTPATIENT
Start: 2022-11-13 | End: 2022-11-13

## 2022-11-13 RX ORDER — MORPHINE SULFATE 2 MG/ML
2 INJECTION, SOLUTION INTRAMUSCULAR; INTRAVENOUS EVERY 4 HOURS PRN
Status: DISCONTINUED | OUTPATIENT
Start: 2022-11-13 | End: 2022-11-22

## 2022-11-13 RX ORDER — OXYCODONE HYDROCHLORIDE 5 MG/1
5 TABLET ORAL EVERY 4 HOURS PRN
Status: DISCONTINUED | OUTPATIENT
Start: 2022-11-13 | End: 2022-11-29 | Stop reason: HOSPADM

## 2022-11-13 RX ADMIN — INSULIN DETEMIR 10 UNITS: 100 INJECTION, SOLUTION SUBCUTANEOUS at 09:11

## 2022-11-13 RX ADMIN — INSULIN ASPART 3 UNITS: 100 INJECTION, SOLUTION INTRAVENOUS; SUBCUTANEOUS at 11:11

## 2022-11-13 RX ADMIN — GADOBUTROL 10 ML: 604.72 INJECTION INTRAVENOUS at 02:11

## 2022-11-13 RX ADMIN — FENTANYL CITRATE 50 MCG: 50 INJECTION, SOLUTION INTRAMUSCULAR; INTRAVENOUS at 10:11

## 2022-11-13 RX ADMIN — VANCOMYCIN HYDROCHLORIDE 1500 MG: 1.5 INJECTION, POWDER, LYOPHILIZED, FOR SOLUTION INTRAVENOUS at 12:11

## 2022-11-13 RX ADMIN — CEFEPIME HYDROCHLORIDE 2 G: 2 INJECTION, SOLUTION INTRAVENOUS at 11:11

## 2022-11-13 RX ADMIN — CEFEPIME HYDROCHLORIDE 2 G: 2 INJECTION, SOLUTION INTRAVENOUS at 04:11

## 2022-11-13 RX ADMIN — SODIUM CHLORIDE, SODIUM LACTATE, POTASSIUM CHLORIDE, AND CALCIUM CHLORIDE: 600; 310; 30; 20 INJECTION, SOLUTION INTRAVENOUS at 10:11

## 2022-11-13 RX ADMIN — INSULIN ASPART 10 UNITS: 100 INJECTION, SOLUTION INTRAVENOUS; SUBCUTANEOUS at 05:11

## 2022-11-13 RX ADMIN — INSULIN ASPART 4 UNITS: 100 INJECTION, SOLUTION INTRAVENOUS; SUBCUTANEOUS at 07:11

## 2022-11-13 RX ADMIN — AMLODIPINE BESYLATE 5 MG: 5 TABLET ORAL at 07:11

## 2022-11-13 RX ADMIN — ACETAMINOPHEN 650 MG: 325 TABLET ORAL at 08:11

## 2022-11-13 RX ADMIN — PROPOFOL 180 MG: 10 INJECTION, EMULSION INTRAVENOUS at 10:11

## 2022-11-13 RX ADMIN — ENOXAPARIN SODIUM 40 MG: 40 INJECTION SUBCUTANEOUS at 04:11

## 2022-11-13 RX ADMIN — MIDAZOLAM HYDROCHLORIDE 2 MG: 1 INJECTION, SOLUTION INTRAMUSCULAR; INTRAVENOUS at 10:11

## 2022-11-13 RX ADMIN — FENTANYL CITRATE 25 MCG: 50 INJECTION, SOLUTION INTRAMUSCULAR; INTRAVENOUS at 11:11

## 2022-11-13 RX ADMIN — LIDOCAINE HYDROCHLORIDE 50 MG: 20 INJECTION, SOLUTION INTRAVENOUS at 10:11

## 2022-11-13 RX ADMIN — CEFEPIME HYDROCHLORIDE 2 G: 2 INJECTION, SOLUTION INTRAVENOUS at 07:11

## 2022-11-13 NOTE — ASSESSMENT & PLAN NOTE
X-rays showing soft tissue emphysema which is concerning for gas gangrene versus necrotizing fasciitis.  Immediate surgical incision drainage with open wound packing

## 2022-11-13 NOTE — ASSESSMENT & PLAN NOTE
This patient does have evidence of infective focus  My overall impression is sepsis. Vital signs were reviewed and noted in progress note.  Antibiotics given-   Antibiotics (From admission, onward)    Start     Stop Route Frequency Ordered    11/13/22 1550  cefepime in dextrose 5 % IVPB 2 g         -- IV Every 8 hours (non-standard times) 11/13/22 0919    11/12/22 2053  vancomycin - pharmacy to dose  (vancomycin IVPB)        See Hyperspace for full Linked Orders Report.    -- IV pharmacy to manage frequency 11/12/22 1954        Cultures were taken-   Microbiology Results (last 7 days)     Procedure Component Value Units Date/Time    Blood Culture #2 **CANNOT BE ORDERED STAT** [[id number]] Collected: 11/12/22 2021    Order Status: Completed Specimen: Blood from Peripheral, Forearm, Left Updated: 11/13/22 1449     Blood Culture, Routine Gram stain silva bottle: Gram positive cocci in chains resembling Strep      Results called to and read back by:Deolres Zhou RN 11/13/2022  13:16      Gram stain aer bottle: Gram positive cocci in chains resembling Strep      Positive results previously called 11/13/2022  14:48    Blood Culture #1 **CANNOT BE ORDERED STAT** [[id number]] Collected: 11/12/22 1941    Order Status: Completed Specimen: Blood from Peripheral, Antecubital, Left Updated: 11/13/22 1447     Blood Culture, Routine Gram stain silva bottle: Gram positive cocci in chains resembling Strep      Results called to and read back by:Delores Zhou RN 11/13/2022  13:16      Gram stain aer bottle: Gram positive cocci in chains resembling Strep      Positive results previously called 11/13/2022  14:47    Gram stain [[id number]] Collected: 11/13/22 1148    Order Status: Sent Specimen: Abscess from Foot, Left Updated: 11/13/22 1212    Fungus culture [[id number]] Collected: 11/13/22 1148    Order Status: Sent Specimen: Abscess from Foot, Left Updated: 11/13/22 1211    Aerobic culture [[id number]] Collected: 11/13/22 1148    Order Status: Sent  Specimen: Abscess from Foot, Left Updated: 11/13/22 1211    AFB Culture & Smear [[id number]] Collected: 11/13/22 1148    Order Status: Sent Specimen: Abscess from Foot, Left Updated: 11/13/22 1211    Culture, Anaerobe [[id number]] Collected: 11/13/22 1148    Order Status: Sent Specimen: Abscess from Foot, Left Updated: 11/13/22 1210    Influenza A & B by Molecular [[id number]] Collected: 11/12/22 2027    Order Status: Completed Specimen: Nasopharyngeal Swab Updated: 11/12/22 2102     Influenza A, Molecular Negative     Influenza B, Molecular Negative     Flu A & B Source Nasal swab    Influenza A & B by Molecular [[id number]] Collected: 11/12/22 1933    Order Status: Canceled Specimen: Nasopharyngeal Swab         Latest lactate reviewed, they are-  Recent Labs   Lab 11/12/22 1937   LACTATE 1.9       Organ dysfunction indicated by Acute kidney injury  Source- Left Foot    Source control Achieved by- Cefepime    --Added Vancomycin  --Repeat Blood cultures 11/14

## 2022-11-13 NOTE — HOSPITAL COURSE
Patient was admitted to the hospital medicine.  Upon arrival, labs were performed:  WBC 22 0.3, hemoglobin 11, platelets 367, creatinine 1.7, .2.  Last hemoglobin A1c 10.8.  Blood cultures time to perform.  X-rays taken showing subcutaneous emphysema to the dorsal aspect of the 1st metatarsal bone to the midshaft location with possible osteomyelitis to the proximal phalanx.  Currently NPO    Following surgery, will plan to leave the wound open for delayed primary closure versus additional surgical interventions.  Will also plan for MRI and arterial studies to the left lower extremities.

## 2022-11-13 NOTE — PROGRESS NOTES
"Pharmacokinetic Initial Assessment: IV Vancomycin    Assessment/Plan:    Initiate intravenous vancomycin with loading dose of 2000 mg once with subsequent doses when random concentrations are less than 20 mcg/mL  Desired empiric serum trough concentration is 15 to 20 mcg/mL  Draw vancomycin random level on 11/13 at 0930.  Pharmacy will continue to follow and monitor vancomycin.      Please contact pharmacy at extension [phone] with any questions regarding this assessment.     Thank you for the consult,   Rita Acosta       Patient brief summary:  Jsoeph Paris Jr. is a 52 y.o. male initiated on antimicrobial therapy with IV Vancomycin for treatment of suspected bone/joint infection    Drug Allergies:   Review of patient's allergies indicates:   Allergen Reactions    Lisinopril Swelling    Penicillins Other (See Comments)     Pt unsure of reaction, stating "I just know I'm allergic"       Actual Body Weight:   112 kg    Renal Function:   Estimated Creatinine Clearance: 57 mL/min (A) (based on SCr of 2 mg/dL (H)).,     Dialysis Method (if applicable):  N/A    CBC (last 72 hours):  Recent Labs   Lab Result Units 11/12/22  1937   WBC K/uL 22.99*   Hemoglobin g/dL 11.6*   Hematocrit % 34.2*   Platelets K/uL 378   Gran % % 86.0*   Lymph % % 6.7*   Mono % % 6.0   Eosinophil % % 0.1   Basophil % % 0.3   Differential Method  Automated       Metabolic Panel (last 72 hours):  Recent Labs   Lab Result Units 11/12/22 1937   Sodium mmol/L 131*   Potassium mmol/L 4.2   Chloride mmol/L 91*   CO2 mmol/L 23   Glucose mg/dL 267*   BUN mg/dL 27*   Creatinine mg/dL 2.0*   Albumin g/dL 2.5*   Total Bilirubin mg/dL 1.9*   Alkaline Phosphatase U/L 260*   AST U/L 43*   ALT U/L 12       Drug levels (last 3 results):  No results for input(s): VANCOMYCINRA, VANCORANDOM, VANCOMYCINPE, VANCOPEAK, VANCOMYCINTR, VANCOTROUGH in the last 72 hours.    Microbiologic Results:  Microbiology Results (last 7 days)       Procedure Component Value Units " Date/Time    Influenza A & B by Molecular [[id number]] Collected: 11/12/22 2027    Order Status: Completed Specimen: Nasopharyngeal Swab Updated: 11/12/22 2102     Influenza A, Molecular Negative     Influenza B, Molecular Negative     Flu A & B Source Nasal swab    Blood Culture #2 **CANNOT BE ORDERED STAT** [[id number]] Collected: 11/12/22 2021    Order Status: Sent Specimen: Blood from Peripheral, Forearm, Left Updated: 11/12/22 2022    Blood Culture #1 **CANNOT BE ORDERED STAT** [[id number]] Collected: 11/12/22 1941    Order Status: Sent Specimen: Blood from Peripheral, Antecubital, Left Updated: 11/12/22 1942    Influenza A & B by Molecular [[id number]] Collected: 11/12/22 1933    Order Status: Canceled Specimen: Nasopharyngeal Swab

## 2022-11-13 NOTE — ASSESSMENT & PLAN NOTE
Discussed the importance of appropriate glycemic control.  Last A1c is 10.8.  Patient does need assistance with long-term diabetic management upon discharge.  Places him at increased risk of major complications and limb loss

## 2022-11-13 NOTE — ASSESSMENT & PLAN NOTE
Patient with acute kidney injury likely due to IVVD/dehydration and pre-renal azotemia TIERA is currently worsening. Labs reviewed- Renal function/electrolytes with Estimated Creatinine Clearance: 67.1 mL/min (A) (based on SCr of 1.7 mg/dL (H)). according to latest data. Monitor urine output and serial BMP and adjust therapy as needed. Avoid nephrotoxins and renally dose meds for GFR listed above.     S/t sepsis vs CKD  IV fluids  Trend Cr- improving

## 2022-11-13 NOTE — HPI
Patient is a 52-year-old male with past medical history of hypertension, diabetes mellitus type 2, peripheral neuropathy, peripheral vascular disease (Hx of femoral bypass), history of gastic sleeve present to the emergency department for bilateral foot swelling, fever, and shortness of breath which is worsened over the last week.  He does have a history of multiple wounds to the right left foot.  He was initially seen at our [facility] on 04/26/2022 and total contact casting was performed.  His last appointment at the [facility] was 10/11/2022 in noted to have a left midfoot ulceration.  Per report, patient states that he lost his insurance coverage and was able to follow-up with wound care.  He attempted to perform a self incision drainage to his foot with the hot water massager.  States that following this, the foot became more infected in worsened significantly.  Does have fever shortness of breath.  Chest pain, nausea, vomiting, weakness, dizziness.

## 2022-11-13 NOTE — TRANSFER OF CARE
"Anesthesia Transfer of Care Note    Patient: Joseph Paris Jr.    Procedure(s) Performed: Procedure(s) (LRB):  INCISION AND DRAINAGE, FOOT (Left)    Patient location: PACU    Anesthesia Type: general    Transport from OR: Transported from OR on room air with adequate spontaneous ventilation    Post pain: adequate analgesia    Post assessment: no apparent anesthetic complications and tolerated procedure well    Post vital signs: stable    Level of consciousness: awake    Nausea/Vomiting: no nausea/vomiting    Complications: none    Transfer of care protocol was followed      Last vitals:   Visit Vitals  BP (!) 171/75 (BP Location: Right arm, Patient Position: Lying)   Pulse 92   Temp 37.3 °C (99.2 °F) (Oral)   Resp 16   Ht 6' 3" (1.905 m)   Wt 112 kg (246 lb 14.6 oz)   SpO2 95%   BMI 30.86 kg/m²     "

## 2022-11-13 NOTE — PLAN OF CARE
Patient currently status post incision drainage of left foot abscess/necrotizing fasciitis.  Recommend to follow intraoperative cultures.  There was little to no bleeding present on the incision or deep dissection.  Recommend to obtain noninvasive arterial studies with likely referral to vascular surgery in conjunction with MRI.  I am concerned on the survival ability of the dorsal flap due to substantial subcutaneous tissue necrosis.    Daily dressing changes with Betadine soaked gauze.  Patient will likely return to the operating room for additional washout versus partial foot amputation versus more proximal amputation based on survive ability of the soft tissue, vascular studies, and MRI.

## 2022-11-13 NOTE — PLAN OF CARE
Problem: Adult Inpatient Plan of Care  Goal: Plan of Care Review  Outcome: Ongoing, Progressing  Goal: Patient-Specific Goal (Individualized)  Outcome: Ongoing, Progressing  Goal: Absence of Hospital-Acquired Illness or Injury  Outcome: Ongoing, Progressing  Goal: Optimal Comfort and Wellbeing  Outcome: Ongoing, Progressing  Goal: Readiness for Transition of Care  Outcome: Ongoing, Progressing     Problem: Diabetes Comorbidity  Goal: Blood Glucose Level Within Targeted Range  Outcome: Ongoing, Progressing     Problem: Adjustment to Illness (Sepsis/Septic Shock)  Goal: Optimal Coping  Outcome: Ongoing, Progressing     Problem: Bleeding (Sepsis/Septic Shock)  Goal: Absence of Bleeding  Outcome: Ongoing, Progressing     Problem: Glycemic Control Impaired (Sepsis/Septic Shock)  Goal: Blood Glucose Level Within Desired Range  Outcome: Ongoing, Progressing     Problem: Infection Progression (Sepsis/Septic Shock)  Goal: Absence of Infection Signs and Symptoms  Outcome: Ongoing, Progressing     Problem: Nutrition Impaired (Sepsis/Septic Shock)  Goal: Optimal Nutrition Intake  Outcome: Ongoing, Progressing     Problem: Fluid and Electrolyte Imbalance (Acute Kidney Injury/Impairment)  Goal: Fluid and Electrolyte Balance  Outcome: Ongoing, Progressing     Problem: Oral Intake Inadequate (Acute Kidney Injury/Impairment)  Goal: Optimal Nutrition Intake  Outcome: Ongoing, Progressing     Problem: Renal Function Impairment (Acute Kidney Injury/Impairment)  Goal: Effective Renal Function  Outcome: Ongoing, Progressing     Problem: Skin Injury Risk Increased  Goal: Skin Health and Integrity  Outcome: Ongoing, Progressing     Problem: Pain Acute  Goal: Acceptable Pain Control and Functional Ability  Outcome: Ongoing, Progressing     Problem: Fall Injury Risk  Goal: Absence of Fall and Fall-Related Injury  Outcome: Ongoing, Progressing

## 2022-11-13 NOTE — PROGRESS NOTES
Pharmacist Renal Dose Adjustment Note    Joseph Paris Jr. is a 52 y.o. male being treated with the medication Cefepime.    Patient Data:    Vital Signs (Most Recent):  Temp: 99.2 °F (37.3 °C) (11/13/22 0754)  Pulse: 92 (11/13/22 0754)  Resp: 16 (11/13/22 0754)  BP: (!) 171/75 (11/13/22 0754)  SpO2: 95 % (11/13/22 0754)   Vital Signs (72h Range):  Temp:  [98.7 °F (37.1 °C)-100.5 °F (38.1 °C)]   Pulse:  []   Resp:  [10-20]   BP: (107-215)/(55-88)   SpO2:  [92 %-99 %]      Recent Labs   Lab 11/12/22 1937 11/13/22  0615   CREATININE 2.0* 1.7*     Serum creatinine: 1.7 mg/dL (H) 11/13/22 0615  Estimated creatinine clearance: 67.1 mL/min (A)    Medication:Cefepime 2 grams every 12 hours will be changed to Cefepime 2 grams every 8 hours per pharmacy protocol due to Scr improvement.    Pharmacist's Name: Carmine Townsend  Pharmacist's Extension: [phone]

## 2022-11-13 NOTE — ASSESSMENT & PLAN NOTE
IV abx  Podiatry following  S/p I&D 11/13  BC: gram+ cocci in both bottles, repeat tomorrow  MRI foot today  Podiatry rec angiogram of left leg, possible vascular surgery consult  ESR, CRP elevated

## 2022-11-13 NOTE — FIRST PROVIDER EVALUATION
"Medical screening examination initiated.  I have conducted a focused provider triage encounter, findings are as follows:    Brief history of present illness:  swelling to bilateral feet after used a foot massager and developed blisters. Now with fever. Hx of diabetes.     Vitals:    11/12/22 1858   BP: (!) 160/72   BP Location: Right arm   Patient Position: Sitting   Pulse: (!) 116   Resp: 20   Temp: (!) 100.5 °F (38.1 °C)   TempSrc: Oral   SpO2: 98%   Weight: 112 kg (246 lb 14.6 oz)   Height: 6' 3" (1.905 m)       Pertinent physical exam:  swelling to feet noted but in socks.  Tired and ill appearing.  Tachycardic.     Brief workup plan:  labs    Preliminary workup initiated; this workup will be continued and followed by the physician or advanced practice provider that is assigned to the patient when roomed.  "

## 2022-11-13 NOTE — SUBJECTIVE & OBJECTIVE
"Past Medical History:   Diagnosis Date    Coronary artery disease     Diabetes mellitus     Esophageal cancer     Hypertension        Past Surgical History:   Procedure Laterality Date    CORONARY ARTERY BYPASS GRAFT      FEMORAL BYPASS         Review of patient's allergies indicates:   Allergen Reactions    Lisinopril Swelling    Penicillins Other (See Comments)     Pt unsure of reaction, stating "I just know I'm allergic"       No current facility-administered medications on file prior to encounter.     Current Outpatient Medications on File Prior to Encounter   Medication Sig    blood sugar diagnostic Strp 2 (two) times daily.    calcium citrate-vitamin D3 315-250 mg-unit Tab Take 1 tablet by mouth once daily.    dulaglutide 0.75 mg/0.5 mL PnIj Inject 0.75 mg into the skin every 7 days.    empagliflozin 25 mg Tab Take 25 mg by mouth once daily.    gabapentin (NEURONTIN) 300 MG capsule Take by mouth every evening.    glipiZIDE (GLUCOTROL) 5 MG TR24 Take 5 mg by mouth once daily.    losartan-hydrochlorothiazide 50-12.5 mg (HYZAAR) 50-12.5 mg per tablet Take 1 tablet by mouth once daily.    metformin (GLUCOPHAGE-XR) 750 MG 24 hr tablet Take 1,500 mg by mouth daily with breakfast.    omeprazole (PRILOSEC) 20 MG capsule Take 1 capsule by mouth once daily.    pravastatin (PRAVACHOL) 20 MG tablet Take 20 mg by mouth once daily.     Family History       Problem Relation (Age of Onset)    Cancer Father    Diabetes Sister, Brother    Heart disease Mother    Stroke Mother          Tobacco Use    Smoking status: Former    Smokeless tobacco: Never   Substance and Sexual Activity    Alcohol use: Yes    Drug use: No    Sexual activity: Not on file     Review of Systems   Constitutional:  Positive for activity change, fatigue and fever. Negative for chills.   Respiratory:  Positive for shortness of breath. Negative for chest tightness and wheezing.    Cardiovascular:  Negative for chest pain and leg swelling.   Gastrointestinal: "  Negative for abdominal pain, diarrhea, nausea and vomiting.   Genitourinary:  Negative for flank pain.   Musculoskeletal:  Negative for back pain, joint swelling and myalgias.   Skin:  Positive for color change and wound.   Neurological:  Negative for syncope, weakness and light-headedness.   Psychiatric/Behavioral:  Negative for confusion.    Objective:     Vital Signs (Most Recent):  Temp: (!) 100.5 °F (38.1 °C) (11/12/22 1946)  Pulse: 86 (11/12/22 2217)  Resp: 18 (11/12/22 2217)  BP: (!) 107/55 (11/12/22 2217)  SpO2: 98 % (11/12/22 2217)   Vital Signs (24h Range):  Temp:  [100.5 °F (38.1 °C)] 100.5 °F (38.1 °C)  Pulse:  [] 86  Resp:  [10-20] 18  SpO2:  [92 %-99 %] 98 %  BP: (107-161)/(55-75) 107/55     Weight: 112 kg (246 lb 14.6 oz)  Body mass index is 30.86 kg/m².    Physical Exam  Constitutional:       General: He is not in acute distress.     Appearance: Normal appearance. He is ill-appearing.   HENT:      Head: Normocephalic and atraumatic.   Cardiovascular:      Rate and Rhythm: Normal rate and regular rhythm.   Pulmonary:      Effort: Pulmonary effort is normal.      Breath sounds: Normal breath sounds.   Abdominal:      General: Abdomen is flat. Bowel sounds are normal. There is no distension.      Palpations: Abdomen is soft.      Tenderness: There is no abdominal tenderness. There is no guarding.   Musculoskeletal:         General: Swelling and tenderness present. Normal range of motion.      Right lower leg: No edema.      Left lower leg: No edema.      Comments: LLE wounds   Skin:     General: Skin is warm and dry.      Findings: Lesion present.      Comments: Quarter sized necrotic ulcer to surface of right heel  1st metatarsal left toe swelling and erythema   Neurological:      General: No focal deficit present.      Mental Status: He is alert and oriented to person, place, and time.           Significant Labs: All pertinent labs within the past 24 hours have been reviewed.    Significant  Imaging: I have reviewed all pertinent imaging results/findings within the past 24 hours.

## 2022-11-13 NOTE — BRIEF OP NOTE
[facility] - Med Surg  Brief Operative Note    SUMMARY     Surgery Date: 11/13/2022     Surgeon(s) and Role:     * Sierra Augustine DPM - Primary    Assisting Surgeon: None    Pre-op Diagnosis:  Gas gangrene of foot [A48.0]  Abscess of foot including toes, left [L02.612]  Type 2 diabetes mellitus with diabetic polyneuropathy, without long-term current use of insulin [E11.42]    Post-op Diagnosis:  Post-Op Diagnosis Codes:     * Gas gangrene of foot [A48.0]     * Abscess of foot including toes, left [L02.612]     * Type 2 diabetes mellitus with diabetic polyneuropathy, without long-term current use of insulin [E11.42]    Procedure(s) (LRB):  INCISION AND DRAINAGE, FOOT (Left)    Anesthesia: Choice    Operative Findings: Gas gangrene/ Necrotizing fasciitis to the left foot. Large abscess at the first MPJ dorsal foot progressing proximally to the anterior ankle and proximal-laterally to the dorsal proximal midfoot. Very little bleeding without tourniquet or exsanguination.     Estimated Blood Loss: * No values recorded between 11/13/2022 11:06 AM and 11/13/2022 11:45 AM *    Estimated Blood Loss has not been documented. EBL = 5.         Specimens:   Specimen (24h ago, onward)      None            [id number]

## 2022-11-13 NOTE — SUBJECTIVE & OBJECTIVE
"Scheduled Meds:   amLODIPine  5 mg Oral Daily    ceFEPime (MAXIPIME) IVPB  2 g Intravenous Q12H    enoxaparin  40 mg Subcutaneous Daily    insulin detemir U-100  10 Units Subcutaneous QHS     Continuous Infusions:  PRN Meds:acetaminophen, acetaminophen, glucagon (human recombinant), glucose, glucose, hydrALAZINE, insulin aspart U-100, naloxone, ondansetron, sodium chloride 0.9%, Pharmacy to dose Vancomycin consult **AND** vancomycin - pharmacy to dose    Review of patient's allergies indicates:   Allergen Reactions    Lisinopril Swelling    Penicillins Other (See Comments)     Pt unsure of reaction, stating "I just know I'm allergic"        Past Medical History:   Diagnosis Date    Coronary artery disease     Diabetes mellitus     Esophageal cancer     Hypertension      Past Surgical History:   Procedure Laterality Date    CORONARY ARTERY BYPASS GRAFT      FEMORAL BYPASS         Family History       Problem Relation (Age of Onset)    Cancer Father    Diabetes Sister, Brother    Heart disease Mother    Stroke Mother          Tobacco Use    Smoking status: Former    Smokeless tobacco: Never   Substance and Sexual Activity    Alcohol use: Yes    Drug use: No    Sexual activity: Not on file     Review of Systems   Constitutional:  Positive for chills and fever. Negative for appetite change.   HENT:  Negative for sore throat and trouble swallowing.    Eyes:  Negative for visual disturbance.   Respiratory:  Positive for shortness of breath. Negative for chest tightness.    Cardiovascular:  Positive for leg swelling. Negative for chest pain.   Gastrointestinal:  Negative for diarrhea, nausea and vomiting.   Genitourinary:  Negative for dysuria and flank pain.   Musculoskeletal:  Positive for gait problem and joint swelling. Negative for back pain.   Skin:  Positive for color change and wound. Negative for pallor.   Allergic/Immunologic: Negative for immunocompromised state.   Neurological:  Positive for weakness and " numbness. Negative for dizziness and light-headedness.   Psychiatric/Behavioral:  Negative for confusion and hallucinations.    Objective:     Vital Signs (Most Recent):  Temp: 99.2 °F (37.3 °C) (11/13/22 0754)  Pulse: 92 (11/13/22 0754)  Resp: 16 (11/13/22 0754)  BP: (!) 171/75 (11/13/22 0754)  SpO2: 95 % (11/13/22 0754)   Vital Signs (24h Range):  Temp:  [98.7 °F (37.1 °C)-100.5 °F (38.1 °C)] 99.2 °F (37.3 °C)  Pulse:  [] 92  Resp:  [10-20] 16  SpO2:  [92 %-99 %] 95 %  BP: (107-215)/(55-88) 171/75     Weight: 112 kg (246 lb 14.6 oz)  Body mass index is 30.86 kg/m².    Foot Exam    CONSTITUTIONAL:  Patient is awake, alert, and oriented to person, place, and time.  Patient is well groomed with normal appearance.  No activity change.  No appetite change.    EYES:  Pupils are equal and reactive to light.  No icterus    ENT:  Mucous membranes are moist.  Dentition intact.    PULMONARY:  Breathing is nonlabored.  No wheezing or rales    VASCULAR:  The right dorsalis pedis pulse 2/4 and the right posterior tibial pulse 1/4.  The left dorsalis pedis pulse 2/4 and posterior tibial pulse on the left is 1/4.  Capillary refill is intact.  Pedal hair growth decreased.     NEUROLOGICAL:  Protective sensation is not intact to the right left foot.  Vibratory sensation is diminished.  Proprioception is intact.  Sharp/dull is reduced.    DERMATOLOGICAL:  Large underlying abscess with fluctuance to the left 1st metatarsophalangeal joint progressing along the dorsal aspect of the left 1st metatarsal bone.  Significant soft tissue edema is noted.  Significant underlying abscess is noted.  Small hemostat was placed into a a soft spot in the skin which point a large abscess was expressed.  Proximally 20-30 cc of purulence was removed.    Bilateral stable necrotic wounds present to the plantar heel    MUSCULOSKELETAL:  No gross or structural anatomic deformity      Laboratory:  A1C:   Recent Labs   Lab 07/26/22  1058   HGBA1C 10.8*      ABGs: No results for input(s): PH, PCO2, HCO3, POCSATURATED, BE in the last 168 hours.  Blood Cultures: No results for input(s): LABBLOO in the last 48 hours.  Cardiac Markers: No results for input(s): CKMB, TROPONINT, MYOGLOBIN in the last 168 hours.  CBC:   Recent Labs   Lab 11/13/22  0615   WBC 22.30*   RBC 3.46*   HGB 11.0*   HCT 32.6*      MCV 94   MCH 31.8*   MCHC 33.7     CPS: {:LNK,LABRCNTIP[  CRP:   Recent Labs   Lab 11/12/22 1937   .2*     ESR:   Recent Labs   Lab 11/12/22 1937   SEDRATE 125*     Wound Cultures: No results for input(s): LABAERO in the last 4320 hours.  Microbiology Results (last 7 days)       Procedure Component Value Units Date/Time    Blood Culture #1 **CANNOT BE ORDERED STAT** [[id number]] Collected: 11/12/22 1941    Order Status: Sent Specimen: Blood from Peripheral, Antecubital, Left Updated: 11/13/22 0241    Blood Culture #2 **CANNOT BE ORDERED STAT** [[id number]] Collected: 11/12/22 2021    Order Status: Sent Specimen: Blood from Peripheral, Forearm, Left Updated: 11/13/22 0241    Influenza A & B by Molecular [[id number]] Collected: 11/12/22 2027    Order Status: Completed Specimen: Nasopharyngeal Swab Updated: 11/12/22 2102     Influenza A, Molecular Negative     Influenza B, Molecular Negative     Flu A & B Source Nasal swab    Influenza A & B by Molecular [[id number]] Collected: 11/12/22 1933    Order Status: Canceled Specimen: Nasopharyngeal Swab           No results for input(s): COLORU, CLARITYU, SPECGRAV, PHUR, PROTEINUA, GLUCOSEU, BILIRUBINCON, BLOODU, WBCU, RBCU, BACTERIA, MUCUS, NITRITE, LEUKOCYTESUR, UROBILINOGEN, HYALINECASTS in the last 168 hours.    Diagnostic Results:  I have reviewed all pertinent imaging results/findings within the past 24 hours.    Clinical Findings:  Diabetic foot infection with possible osteomyelitis and presence of subcutaneous emphysema concerning for gas gangrene/necrotizing fasciitis/abscess      All labs and imaging were  reviewed in detail with the patient in the room.    Medical history and chart was thoroughly reviewed.    Plan discussed with attending provider

## 2022-11-13 NOTE — H&P (VIEW-ONLY)
[facility] Surg  Podiatry  Consult Note    Patient Name: Joseph Paris Jr.  MRN: [id number]  Admission Date: 11/12/2022  Hospital Length of Stay: 0 days  Attending Physician: Kadyen Marroquin, *  Primary Care Provider: Thong Hidalgo Jr, MD     Inpatient consult to Podiatry  Consult performed by: Sierra Augustine DPM  Consult ordered by: Denisha Neville MD  Reason for consult: Acute osteomyelitis  Assessment/Recommendations: Gas gangrene present on x-ray imaging requiring immediate surgical incision drainage.  Recommend MRI and noninvasive arterial studies once incision drainage has been performed to determine if osteomyelitis is present.  Will likely need additional procedures once soft tissue has improved and studies have finalized.  Likely Tuesday        Subjective:     History of Present Illness:  Patient is a 52-year-old male with past medical history of hypertension, diabetes mellitus type 2, peripheral neuropathy, peripheral vascular disease (Hx of femoral bypass), history of gastic sleeve present to the emergency department for bilateral foot swelling, fever, and shortness of breath which is worsened over the last week.  He does have a history of multiple wounds to the right left foot.  He was initially seen at our [facility] on 04/26/2022 and total contact casting was performed.  His last appointment at the [facility] was 10/11/2022 in noted to have a left midfoot ulceration.  Per report, patient states that he lost his insurance coverage and was able to follow-up with wound care.  He attempted to perform a self incision drainage to his foot with the hot water massager.  States that following this, the foot became more infected in worsened significantly.  Does have fever shortness of breath.  Chest pain, nausea, vomiting, weakness, dizziness.      Scheduled Meds:   amLODIPine  5 mg Oral Daily    ceFEPime (MAXIPIME) IVPB  2 g Intravenous Q12H    enoxaparin  40 mg  "Subcutaneous Daily    insulin detemir U-100  10 Units Subcutaneous QHS     Continuous Infusions:  PRN Meds:acetaminophen, acetaminophen, glucagon (human recombinant), glucose, glucose, hydrALAZINE, insulin aspart U-100, naloxone, ondansetron, sodium chloride 0.9%, Pharmacy to dose Vancomycin consult **AND** vancomycin - pharmacy to dose    Review of patient's allergies indicates:   Allergen Reactions    Lisinopril Swelling    Penicillins Other (See Comments)     Pt unsure of reaction, stating "I just know I'm allergic"        Past Medical History:   Diagnosis Date    Coronary artery disease     Diabetes mellitus     Esophageal cancer     Hypertension      Past Surgical History:   Procedure Laterality Date    CORONARY ARTERY BYPASS GRAFT      FEMORAL BYPASS         Family History       Problem Relation (Age of Onset)    Cancer Father    Diabetes Sister, Brother    Heart disease Mother    Stroke Mother          Tobacco Use    Smoking status: Former    Smokeless tobacco: Never   Substance and Sexual Activity    Alcohol use: Yes    Drug use: No    Sexual activity: Not on file     Review of Systems   Constitutional:  Positive for chills and fever. Negative for appetite change.   HENT:  Negative for sore throat and trouble swallowing.    Eyes:  Negative for visual disturbance.   Respiratory:  Positive for shortness of breath. Negative for chest tightness.    Cardiovascular:  Positive for leg swelling. Negative for chest pain.   Gastrointestinal:  Negative for diarrhea, nausea and vomiting.   Genitourinary:  Negative for dysuria and flank pain.   Musculoskeletal:  Positive for gait problem and joint swelling. Negative for back pain.   Skin:  Positive for color change and wound. Negative for pallor.   Allergic/Immunologic: Negative for immunocompromised state.   Neurological:  Positive for weakness and numbness. Negative for dizziness and light-headedness.   Psychiatric/Behavioral:  Negative for confusion and " hallucinations.    Objective:     Vital Signs (Most Recent):  Temp: 99.2 °F (37.3 °C) (11/13/22 0754)  Pulse: 92 (11/13/22 0754)  Resp: 16 (11/13/22 0754)  BP: (!) 171/75 (11/13/22 0754)  SpO2: 95 % (11/13/22 0754)   Vital Signs (24h Range):  Temp:  [98.7 °F (37.1 °C)-100.5 °F (38.1 °C)] 99.2 °F (37.3 °C)  Pulse:  [] 92  Resp:  [10-20] 16  SpO2:  [92 %-99 %] 95 %  BP: (107-215)/(55-88) 171/75     Weight: 112 kg (246 lb 14.6 oz)  Body mass index is 30.86 kg/m².    Foot Exam    CONSTITUTIONAL:  Patient is awake, alert, and oriented to person, place, and time.  Patient is well groomed with normal appearance.  No activity change.  No appetite change.    EYES:  Pupils are equal and reactive to light.  No icterus    ENT:  Mucous membranes are moist.  Dentition intact.    PULMONARY:  Breathing is nonlabored.  No wheezing or rales    VASCULAR:  The right dorsalis pedis pulse 2/4 and the right posterior tibial pulse 1/4.  The left dorsalis pedis pulse 2/4 and posterior tibial pulse on the left is 1/4.  Capillary refill is intact.  Pedal hair growth decreased.     NEUROLOGICAL:  Protective sensation is not intact to the right left foot.  Vibratory sensation is diminished.  Proprioception is intact.  Sharp/dull is reduced.    DERMATOLOGICAL:  Large underlying abscess with fluctuance to the left 1st metatarsophalangeal joint progressing along the dorsal aspect of the left 1st metatarsal bone.  Significant soft tissue edema is noted.  Significant underlying abscess is noted.  Small hemostat was placed into a a soft spot in the skin which point a large abscess was expressed.  Proximally 20-30 cc of purulence was removed.    Bilateral stable necrotic wounds present to the plantar heel    MUSCULOSKELETAL:  No gross or structural anatomic deformity      Laboratory:  A1C:   Recent Labs   Lab 07/26/22  1058   HGBA1C 10.8*     ABGs: No results for input(s): PH, PCO2, HCO3, POCSATURATED, BE in the last 168 hours.  Blood Cultures:  No results for input(s): LABBLOO in the last 48 hours.  Cardiac Markers: No results for input(s): CKMB, TROPONINT, MYOGLOBIN in the last 168 hours.  CBC:   Recent Labs   Lab 11/13/22  0615   WBC 22.30*   RBC 3.46*   HGB 11.0*   HCT 32.6*      MCV 94   MCH 31.8*   MCHC 33.7     CPS: {:LNK,LABRCNTIP[  CRP:   Recent Labs   Lab 11/12/22 1937   .2*     ESR:   Recent Labs   Lab 11/12/22 1937   SEDRATE 125*     Wound Cultures: No results for input(s): LABAERO in the last 4320 hours.  Microbiology Results (last 7 days)       Procedure Component Value Units Date/Time    Blood Culture #1 **CANNOT BE ORDERED STAT** [[id number]] Collected: 11/12/22 1941    Order Status: Sent Specimen: Blood from Peripheral, Antecubital, Left Updated: 11/13/22 0241    Blood Culture #2 **CANNOT BE ORDERED STAT** [[id number]] Collected: 11/12/22 2021    Order Status: Sent Specimen: Blood from Peripheral, Forearm, Left Updated: 11/13/22 0241    Influenza A & B by Molecular [[id number]] Collected: 11/12/22 2027    Order Status: Completed Specimen: Nasopharyngeal Swab Updated: 11/12/22 2102     Influenza A, Molecular Negative     Influenza B, Molecular Negative     Flu A & B Source Nasal swab    Influenza A & B by Molecular [[id number]] Collected: 11/12/22 1933    Order Status: Canceled Specimen: Nasopharyngeal Swab           No results for input(s): COLORU, CLARITYU, SPECGRAV, PHUR, PROTEINUA, GLUCOSEU, BILIRUBINCON, BLOODU, WBCU, RBCU, BACTERIA, MUCUS, NITRITE, LEUKOCYTESUR, UROBILINOGEN, HYALINECASTS in the last 168 hours.    Diagnostic Results:  I have reviewed all pertinent imaging results/findings within the past 24 hours.    Clinical Findings:  Diabetic foot infection with possible osteomyelitis and presence of subcutaneous emphysema concerning for gas gangrene/necrotizing fasciitis/abscess      All labs and imaging were reviewed in detail with the patient in the room.    Medical history and chart was thoroughly reviewed.    Plan  discussed with attending provider           Assessment/Plan:     * Acute osteomyelitis of metatarsal bone of left foot  Recommend MRI following surgical intervention for incision and drainage of left foot abscess/gas gangrene.     Recommend noninvasive arterial studies following surgical intervention today    Gas gangrene of foot  X-rays showing soft tissue emphysema which is concerning for gas gangrene versus necrotizing fasciitis.  Immediate surgical incision drainage with open wound packing    Abscess of foot including toes, left  Immediate surgical incision and drainage.  Discussed risk of major limb loss.    The procedure of (incision drainage of left foot with removal of nonviable soft tissue) was thoroughly explained to the patient. Its necessity and/or purpose and the implications therein were outlined, including any pertinent advantages and/or disadvantages, and possible complications, if any. Possible complications include recurrence of pathology and/or deformity, infection (cellulitis, drainage, purulence, malodor, etc...), pain, numbness, neuritis, edema, burning, loss of function, need for further surgery, possible need for removal of any implanted hardware, soft tissue contracture and/or scarring, etc... No guarantees were given and/or implied. Post-operative expectations and weightbearing protocol is thoroughly explained the patient, who acknowledges understanding.    Diabetes mellitus  Discussed the importance of appropriate glycemic control.  Last A1c is 10.8.  Patient does need assistance with long-term diabetic management upon discharge.  Places him at increased risk of major complications and limb loss    Severe sepsis  Secondary to abscess/gas gangrene of the left foot.  Will plan for immediate surgical incision and drainage with removal of nonviable soft tissue on today's procedure.  We did discuss utilization of MRI and arterial studies prior to determining if amputation is needed.  We will  plan to leave the wound open for additional surgical interventions once studies have been performed and allow the soft tissue to improve     Continue IV antibiotics.  Will take intraoperative cultures of the soft tissue and abscess          Sierra Augustine DPM  Podiatry  [facility] - Med Surg

## 2022-11-13 NOTE — PROGRESS NOTES
[facility] Medicine  Progress Note    Patient Name: Joseph Paris Jr.  MRN: [id number]  Patient Class: IP- Inpatient   Admission Date: 11/12/2022  Length of Stay: 0 days  Attending Physician: Kayden Marroquin, *  Primary Care Provider: Thong Hidalgo Jr, MD        Subjective:     Principal Problem:Acute osteomyelitis of metatarsal bone of left foot        HPI:  52 y.o. male patient with a PMHx of HTN, DM, and CAD presents to the Emergency Department for evaluation of Bilateral Feet Swelling which onset gradually within the past week. The pt last saw their wound care three weeks ago. The pt lost his health insurance coverage and decided to take care of the wounds himself. The pt self drained pus from his wounds and used a heated water massager on his feet; reports feet look more infected than before. Symptoms are constant and moderate in severity. No mitigating or exacerbating factors reported. Associated sxs include fever and SOB. Patient denies any CP, N/V, weakness, dysuria, and all other sxs at this time      Overview/Hospital Course:  S/p I&D of foot per Podiatry, recommended MRI and angio with possible referral to vascular surgery. Concerns for viability of flap due to appearance of wounds. Will likely need to return to the OR later this week. Wound cultures collected during procedure, Blood culture: gram positive cocci in chains in both bottles, adjusted antibiotic regimen, added vancomycin. Will repeat blood cultures tomorrow. Vitals stable, pain well controlled.       Interval History: S/P Left foot I&D today, BC: Gram+ cocci in both bottles, antibiotic regimen adjusted, repeat BC tomorrow. Podiatry ordered MRI of foot, recommend angiogram and possible vascular surgery consult.     Review of Systems   Constitutional:  Positive for activity change, appetite change and fatigue. Negative for chills, diaphoresis, fever and unexpected weight change.   HENT:  Negative for congestion,  hearing loss, nosebleeds, postnasal drip, rhinorrhea and trouble swallowing.    Eyes:  Negative for discharge and visual disturbance.   Respiratory:  Negative for cough, chest tightness and shortness of breath.    Cardiovascular:  Negative for chest pain, palpitations and leg swelling.   Gastrointestinal:  Positive for abdominal distention. Negative for abdominal pain, constipation, diarrhea, nausea and vomiting.   Endocrine: Negative for cold intolerance and heat intolerance.   Genitourinary:  Negative for difficulty urinating, dysuria, frequency and hematuria.   Musculoskeletal:  Positive for arthralgias, back pain, gait problem and myalgias.   Skin:  Positive for wound (foot, left).   Neurological:  Negative for dizziness, weakness, light-headedness and headaches.   Hematological:  Negative for adenopathy. Does not bruise/bleed easily.   Psychiatric/Behavioral:  The patient is nervous/anxious.    Objective:     Vital Signs (Most Recent):  Temp: 98.5 °F (36.9 °C) (11/13/22 1138)  Pulse: 84 (11/13/22 1200)  Resp: 10 (11/13/22 1200)  BP: (!) 159/80 (11/13/22 1200)  SpO2: 96 % (11/13/22 1200)   Vital Signs (24h Range):  Temp:  [98.5 °F (36.9 °C)-100.5 °F (38.1 °C)] 98.5 °F (36.9 °C)  Pulse:  [] 84  Resp:  [10-20] 10  SpO2:  [92 %-100 %] 96 %  BP: (107-215)/(55-88) 159/80     Weight: 112 kg (246 lb 14.6 oz)  Body mass index is 30.86 kg/m².    Intake/Output Summary (Last 24 hours) at 11/13/2022 1446  Last data filed at 11/13/2022 1134  Gross per 24 hour   Intake 750 ml   Output 400 ml   Net 350 ml      Physical Exam  Vitals and nursing note reviewed.   Constitutional:       General: He is not in acute distress.     Appearance: He is well-developed. He is ill-appearing. He is not diaphoretic.   HENT:      Head: Normocephalic and atraumatic.      Right Ear: Hearing and external ear normal.      Left Ear: Hearing and external ear normal.      Nose: Nose normal. No mucosal edema or rhinorrhea.      Mouth/Throat:       Pharynx: Uvula midline.   Eyes:      General:         Right eye: No discharge.         Left eye: No discharge.      Conjunctiva/sclera: Conjunctivae normal.      Right eye: No chemosis.     Left eye: No chemosis.     Pupils: Pupils are equal, round, and reactive to light.   Neck:      Thyroid: No thyroid mass or thyromegaly.      Trachea: Trachea normal.   Cardiovascular:      Rate and Rhythm: Normal rate and regular rhythm.      Pulses:           Dorsalis pedis pulses are 1+ on the right side.      Heart sounds: Normal heart sounds. No murmur heard.  Pulmonary:      Effort: Pulmonary effort is normal. No respiratory distress.      Breath sounds: Normal breath sounds. No decreased breath sounds or wheezing.   Abdominal:      General: Bowel sounds are normal. There is no distension.      Palpations: Abdomen is soft.      Tenderness: There is no abdominal tenderness.   Musculoskeletal:         General: Normal range of motion.      Cervical back: Normal range of motion and neck supple.   Feet:      Left foot:      Skin integrity: Blister, skin breakdown (necrotic wound) and warmth present.   Lymphadenopathy:      Cervical: No cervical adenopathy.      Upper Body:      Right upper body: No supraclavicular adenopathy.      Left upper body: No supraclavicular adenopathy.   Skin:     General: Skin is warm and dry.      Capillary Refill: Capillary refill takes less than 2 seconds.      Findings: No rash.   Neurological:      Mental Status: He is alert and oriented to person, place, and time.   Psychiatric:         Mood and Affect: Mood is not anxious.         Speech: Speech normal.         Behavior: Behavior normal.         Thought Content: Thought content normal.         Judgment: Judgment normal.       Significant Labs: All pertinent labs within the past 24 hours have been reviewed.  CBC:   Recent Labs   Lab 11/12/22 1937 11/13/22  0615   WBC 22.99* 22.30*   HGB 11.6* 11.0*   HCT 34.2* 32.6*    367     CMP:    Recent Labs   Lab 11/12/22  1937 11/13/22  0615   * 132*   K 4.2 4.2   CL 91* 89*   CO2 23 24   * 245*   BUN 27* 28*   CREATININE 2.0* 1.7*   CALCIUM 9.6 8.6*   PROT 9.0*  --    ALBUMIN 2.5*  --    BILITOT 1.9*  --    ALKPHOS 260*  --    AST 43*  --    ALT 12  --    ANIONGAP 17* 19*       Significant Imaging: I have reviewed all pertinent imaging results/findings within the past 24 hours.      Assessment/Plan:      * Acute osteomyelitis of metatarsal bone of left foot  IV abx  Podiatry following  S/p I&D 11/13  BC: gram+ cocci in both bottles, repeat tomorrow  MRI foot today  Podiatry rec angiogram of left leg, possible vascular surgery consult  ESR, CRP elevated        Gas gangrene of foot  Management per Podiatry    --S/P I&D 11/13  --Angiogram  --MRI  --Possible return to OR later this week      Diabetes mellitus  Patient's FSGs are uncontrolled due to hyperglycemia on current medication regimen.  Last A1c reviewed-   Lab Results   Component Value Date    HGBA1C 10.8 (H) 07/26/2022     Most recent fingerstick glucose reviewed-   Recent Labs   Lab 11/12/22  1941 11/12/22  2330 11/13/22  0523 11/13/22  1140   POCTGLUCOSE 261* 377* 246* 194*     Current correctional scale  Medium  Maintain anti-hyperglycemic dose as follows-   Antihyperglycemics (From admission, onward)    Start     Stop Route Frequency Ordered    11/12/22 2330  insulin detemir U-100 pen 10 Units         -- SubQ Nightly 11/12/22 2232 11/12/22 2325  insulin aspart U-100 pen 1-10 Units         -- SubQ Before meals & nightly PRN 11/12/22 2232        Hold Oral hypoglycemics while patient is in the hospital.    TIERA (acute kidney injury)  Patient with acute kidney injury likely due to IVVD/dehydration and pre-renal azotemia TIERA is currently worsening. Labs reviewed- Renal function/electrolytes with Estimated Creatinine Clearance: 67.1 mL/min (A) (based on SCr of 1.7 mg/dL (H)). according to latest data. Monitor urine output and serial  BMP and adjust therapy as needed. Avoid nephrotoxins and renally dose meds for GFR listed above.     S/t sepsis vs CKD  IV fluids  Trend Cr- improving    Severe sepsis  This patient does have evidence of infective focus  My overall impression is sepsis. Vital signs were reviewed and noted in progress note.  Antibiotics given-   Antibiotics (From admission, onward)    Start     Stop Route Frequency Ordered    11/13/22 1550  cefepime in dextrose 5 % IVPB 2 g         -- IV Every 8 hours (non-standard times) 11/13/22 0919    11/12/22 2053  vancomycin - pharmacy to dose  (vancomycin IVPB)        See Hyperspace for full Linked Orders Report.    -- IV pharmacy to manage frequency 11/12/22 1954        Cultures were taken-   Microbiology Results (last 7 days)     Procedure Component Value Units Date/Time    Blood Culture #2 **CANNOT BE ORDERED STAT** [[id number]] Collected: 11/12/22 2021    Order Status: Completed Specimen: Blood from Peripheral, Forearm, Left Updated: 11/13/22 1449     Blood Culture, Routine Gram stain silva bottle: Gram positive cocci in chains resembling Strep      Results called to and read back by:Delores Zhou RN 11/13/2022  13:16      Gram stain aer bottle: Gram positive cocci in chains resembling Strep      Positive results previously called 11/13/2022  14:48    Blood Culture #1 **CANNOT BE ORDERED STAT** [[id number]] Collected: 11/12/22 1941    Order Status: Completed Specimen: Blood from Peripheral, Antecubital, Left Updated: 11/13/22 1447     Blood Culture, Routine Gram stain silva bottle: Gram positive cocci in chains resembling Strep      Results called to and read back by:Delores Zhou RN 11/13/2022  13:16      Gram stain aer bottle: Gram positive cocci in chains resembling Strep      Positive results previously called 11/13/2022  14:47    Gram stain [[id number]] Collected: 11/13/22 1148    Order Status: Sent Specimen: Abscess from Foot, Left Updated: 11/13/22 1212    Fungus culture [[id number]] Collected:  11/13/22 1148    Order Status: Sent Specimen: Abscess from Foot, Left Updated: 11/13/22 1211    Aerobic culture [[id number]] Collected: 11/13/22 1148    Order Status: Sent Specimen: Abscess from Foot, Left Updated: 11/13/22 1211    AFB Culture & Smear [[id number]] Collected: 11/13/22 1148    Order Status: Sent Specimen: Abscess from Foot, Left Updated: 11/13/22 1211    Culture, Anaerobe [[id number]] Collected: 11/13/22 1148    Order Status: Sent Specimen: Abscess from Foot, Left Updated: 11/13/22 1210    Influenza A & B by Molecular [[id number]] Collected: 11/12/22 2027    Order Status: Completed Specimen: Nasopharyngeal Swab Updated: 11/12/22 2102     Influenza A, Molecular Negative     Influenza B, Molecular Negative     Flu A & B Source Nasal swab    Influenza A & B by Molecular [[id number]] Collected: 11/12/22 1933    Order Status: Canceled Specimen: Nasopharyngeal Swab         Latest lactate reviewed, they are-  Recent Labs   Lab 11/12/22 1937   LACTATE 1.9       Organ dysfunction indicated by Acute kidney injury  Source- Left Foot    Source control Achieved by- Cefepime    --Added Vancomycin  --Repeat Blood cultures 11/14        VTE Risk Mitigation (From admission, onward)         Ordered     enoxaparin injection 40 mg  Daily         11/12/22 2232     IP VTE HIGH RISK PATIENT  Once         11/12/22 2232     Place sequential compression device  Until discontinued         11/12/22 2232                Discharge Planning   KIT:      Code Status: Full Code   Is the patient medically ready for discharge?:     Reason for patient still in hospital (select all that apply): Patient trending condition                     Yudi Martinez NP  Department of Hospital Medicine   [facility] - Med Surg

## 2022-11-13 NOTE — ASSESSMENT & PLAN NOTE
Recommend MRI following surgical intervention for incision and drainage of left foot abscess/gas gangrene.     Recommend noninvasive arterial studies following surgical intervention today

## 2022-11-13 NOTE — HPI
52 y.o. male patient with a PMHx of HTN, DM, and CAD presents to the Emergency Department for evaluation of Bilateral Feet Swelling which onset gradually within the past week. The pt last saw their wound care three weeks ago. The pt lost his health insurance coverage and decided to take care of the wounds himself. The pt self drained pus from his wounds and used a heated water massager on his feet; reports feet look more infected than before. Symptoms are constant and moderate in severity. No mitigating or exacerbating factors reported. Associated sxs include fever and SOB. Patient denies any CP, N/V, weakness, dysuria, and all other sxs at this time

## 2022-11-13 NOTE — INTERVAL H&P NOTE
The patient has been examined and the H&P has been reviewed:    I concur with the findings and no changes have occurred since H&P was written.    Surgery risks, benefits and alternative options discussed and understood by patient/family.          Active Hospital Problems    Diagnosis  POA    *Acute osteomyelitis of metatarsal bone of left foot [M86.172]  Yes    Abscess of foot including toes, left [L02.612]  Yes    Gas gangrene of foot [A48.0]  Yes    Severe sepsis [A41.9, R65.20]  Yes    TIERA (acute kidney injury) [N17.9]  Yes    Diabetes mellitus [E11.9]  Yes      Resolved Hospital Problems   No resolved problems to display.

## 2022-11-13 NOTE — ANESTHESIA PROCEDURE NOTES
Intubation    Date/Time: 11/13/2022 10:59 AM  Performed by: Zhang Vazquez II, MD  Authorized by: Zhang Vazquez II, MD     Intubation:     Induction:  Intravenous    Intubated:  Postinduction    Mask Ventilation:  Easy mask    Attempts:  1    Attempted By:  Staff anesthesiologist    Difficult Airway Encountered?: No      Airway Device:  Supraglottic airway/LMA    Airway Device Size:  5.0

## 2022-11-13 NOTE — ANESTHESIA POSTPROCEDURE EVALUATION
Anesthesia Post Evaluation    Patient: Joseph Paris Jr.    Procedure(s) Performed: Procedure(s) (LRB):  INCISION AND DRAINAGE, FOOT (Left)    Final Anesthesia Type: general      Patient location during evaluation: PACU  Patient participation: Yes- Able to Participate  Level of consciousness: awake  Post-procedure vital signs: reviewed and stable  Pain management: adequate  Airway patency: patent    PONV status at discharge: No PONV  Anesthetic complications: no      Cardiovascular status: blood pressure returned to baseline and hemodynamically stable  Respiratory status: unassisted and spontaneous ventilation  Hydration status: euvolemic  Follow-up not needed.          Vitals Value Taken Time   /75 11/13/22 0754   Temp 37.3 °C (99.2 °F) 11/13/22 0754   Pulse 92 11/13/22 0754   Resp 16 11/13/22 0754   SpO2 95 % 11/13/22 0754         No case tracking events are documented in the log.      Pain/Zandra Score: Pain Rating Prior to Med Admin: 3 (11/13/2022  8:13 AM)

## 2022-11-13 NOTE — H&P
"[facility] - Emergency Dept.  [facility]  History & Physical    Patient Name: Joseph Paris Jr.  MRN: [id number]  Patient Class: OP- Observation  Admission Date: 11/12/2022  Attending Physician: Denisha Neville MD  Primary Care Provider: Thong Hidalgo Jr, MD         Patient information was obtained from patient, past medical records and ER records.     Subjective:     Principal Problem:Acute osteomyelitis of metatarsal bone of left foot    Chief Complaint:   Chief Complaint   Patient presents with    Leg Swelling     Bilateral feet swelling, developed fever, shortness of breath, has been treating abscesses to feet himself, made holes in his feet to drain the pus and now reports looks more infected.        HPI: 52 y.o. male patient with a PMHx of HTN, DM, and CAD presents to the Emergency Department for evaluation of Bilateral Feet Swelling which onset gradually within the past week. The pt last saw their wound care three weeks ago. The pt lost his health insurance coverage and decided to take care of the wounds himself. The pt self drained pus from his wounds and used a heated water massager on his feet; reports feet look more infected than before. Symptoms are constant and moderate in severity. No mitigating or exacerbating factors reported. Associated sxs include fever and SOB. Patient denies any CP, N/V, weakness, dysuria, and all other sxs at this time      Past Medical History:   Diagnosis Date    Coronary artery disease     Diabetes mellitus     Esophageal cancer     Hypertension        Past Surgical History:   Procedure Laterality Date    CORONARY ARTERY BYPASS GRAFT      FEMORAL BYPASS         Review of patient's allergies indicates:   Allergen Reactions    Lisinopril Swelling    Penicillins Other (See Comments)     Pt unsure of reaction, stating "I just know I'm allergic"       No current facility-administered medications on file prior to encounter.     Current Outpatient Medications on File " Prior to Encounter   Medication Sig    blood sugar diagnostic Strp 2 (two) times daily.    calcium citrate-vitamin D3 315-250 mg-unit Tab Take 1 tablet by mouth once daily.    dulaglutide 0.75 mg/0.5 mL PnIj Inject 0.75 mg into the skin every 7 days.    empagliflozin 25 mg Tab Take 25 mg by mouth once daily.    gabapentin (NEURONTIN) 300 MG capsule Take by mouth every evening.    glipiZIDE (GLUCOTROL) 5 MG TR24 Take 5 mg by mouth once daily.    losartan-hydrochlorothiazide 50-12.5 mg (HYZAAR) 50-12.5 mg per tablet Take 1 tablet by mouth once daily.    metformin (GLUCOPHAGE-XR) 750 MG 24 hr tablet Take 1,500 mg by mouth daily with breakfast.    omeprazole (PRILOSEC) 20 MG capsule Take 1 capsule by mouth once daily.    pravastatin (PRAVACHOL) 20 MG tablet Take 20 mg by mouth once daily.     Family History       Problem Relation (Age of Onset)    Cancer Father    Diabetes Sister, Brother    Heart disease Mother    Stroke Mother          Tobacco Use    Smoking status: Former    Smokeless tobacco: Never   Substance and Sexual Activity    Alcohol use: Yes    Drug use: No    Sexual activity: Not on file     Review of Systems   Constitutional:  Positive for activity change, fatigue and fever. Negative for chills.   Respiratory:  Positive for shortness of breath. Negative for chest tightness and wheezing.    Cardiovascular:  Negative for chest pain and leg swelling.   Gastrointestinal:  Negative for abdominal pain, diarrhea, nausea and vomiting.   Genitourinary:  Negative for flank pain.   Musculoskeletal:  Negative for back pain, joint swelling and myalgias.   Skin:  Positive for color change and wound.   Neurological:  Negative for syncope, weakness and light-headedness.   Psychiatric/Behavioral:  Negative for confusion.    Objective:     Vital Signs (Most Recent):  Temp: (!) 100.5 °F (38.1 °C) (11/12/22 1946)  Pulse: 86 (11/12/22 2217)  Resp: 18 (11/12/22 2217)  BP: (!) 107/55 (11/12/22 2217)  SpO2: 98 %  (11/12/22 8901)   Vital Signs (24h Range):  Temp:  [100.5 °F (38.1 °C)] 100.5 °F (38.1 °C)  Pulse:  [] 86  Resp:  [10-20] 18  SpO2:  [92 %-99 %] 98 %  BP: (107-161)/(55-75) 107/55     Weight: 112 kg (246 lb 14.6 oz)  Body mass index is 30.86 kg/m².    Physical Exam  Constitutional:       General: He is not in acute distress.     Appearance: Normal appearance. He is ill-appearing.   HENT:      Head: Normocephalic and atraumatic.   Cardiovascular:      Rate and Rhythm: Normal rate and regular rhythm.   Pulmonary:      Effort: Pulmonary effort is normal.      Breath sounds: Normal breath sounds.   Abdominal:      General: Abdomen is flat. Bowel sounds are normal. There is no distension.      Palpations: Abdomen is soft.      Tenderness: There is no abdominal tenderness. There is no guarding.   Musculoskeletal:         General: Swelling and tenderness present. Normal range of motion.      Right lower leg: No edema.      Left lower leg: No edema.      Comments: LLE wounds   Skin:     General: Skin is warm and dry.      Findings: Lesion present.      Comments: Quarter sized necrotic ulcer to surface of right heel  1st metatarsal left toe swelling and erythema   Neurological:      General: No focal deficit present.      Mental Status: He is alert and oriented to person, place, and time.           Significant Labs: All pertinent labs within the past 24 hours have been reviewed.    Significant Imaging: I have reviewed all pertinent imaging results/findings within the past 24 hours.    Assessment/Plan:     * Acute osteomyelitis of metatarsal bone of left foot  IV abx  Consult podiatry, wound care  ESR, CRP elevated    Admit for observation    Severe sepsis  S/t above  IV fluids, empiric Abx      TIERA (acute kidney injury)  Patient with acute kidney injury likely due to IVVD/dehydration and pre-renal azotemia TIERA is currently worsening. Labs reviewed- Renal function/electrolytes with Estimated Creatinine Clearance: 57  mL/min (A) (based on SCr of 2 mg/dL (H)). according to latest data. Monitor urine output and serial BMP and adjust therapy as needed. Avoid nephrotoxins and renally dose meds for GFR listed above.     S/t sepsis vs CKD  IV fluids  Trend Cr    Diabetes mellitus  Patient's FSGs are uncontrolled due to hyperglycemia on current medication regimen.  Last A1c reviewed-   Lab Results   Component Value Date    HGBA1C 10.8 (H) 07/26/2022     Most recent fingerstick glucose reviewed-   Recent Labs   Lab 11/12/22  1941   POCTGLUCOSE 261*     Current correctional scale  Medium  Maintain anti-hyperglycemic dose as follows-   Antihyperglycemics (From admission, onward)    Start     Stop Route Frequency Ordered    11/12/22 2330  insulin detemir U-100 pen 10 Units         -- SubQ Nightly 11/12/22 2232 11/12/22 2325  insulin aspart U-100 pen 1-10 Units         -- SubQ Before meals & nightly PRN 11/12/22 2232        Hold Oral hypoglycemics while patient is in the hospital.    VTE Risk Mitigation (From admission, onward)         Ordered     enoxaparin injection 40 mg  Daily         11/12/22 2232     IP VTE HIGH RISK PATIENT  Once         11/12/22 2232     Place sequential compression device  Until discontinued         11/12/22 2232                   Denisha Neville MD  Department of Hospital Medicine   [facility] - Emergency Dept.

## 2022-11-13 NOTE — ANESTHESIA PREPROCEDURE EVALUATION
11/13/2022  Joseph Paris Jr. is a 52 y.o., male.    Patient is a 52-year-old male with past medical history of hypertension, diabetes mellitus type 2, peripheral neuropathy, peripheral vascular disease (Hx of femoral bypass), history of gastic sleeve present to the emergency department for bilateral foot swelling, fever, and shortness of breath which is worsened over the last week.  He does have a history of multiple wounds to the right left foot.  He was initially seen at our [facility] on 04/26/2022 and total contact casting was performed.  His last appointment at the [facility] Care Center was 10/11/2022 in noted to have a left midfoot ulceration.  Per report, patient states that he lost his insurance coverage and was able to follow-up with wound care.  He attempted to perform a self incision drainage to his foot with the hot water massager.  States that following this, the foot became more infected in worsened significantly.     Patient Active Problem List   Diagnosis    Abrasion of left foot    Abrasion of left calf    Acute osteomyelitis of metatarsal bone of left foot    Severe sepsis    TIERA (acute kidney injury)    Diabetes mellitus    Abscess of foot including toes, left    Gas gangrene of foot       Pre-op Assessment    I have reviewed the Patient Summary Reports.    I have reviewed the NPO Status.   I have reviewed the Medications.     Review of Systems  Anesthesia Hx:  No problems with previous Anesthesia    Social:  Non-Smoker, Former Smoker    Hematology/Oncology:  Hematology Normal        Cardiovascular:   Hypertension CAD  CABG/stent     Pulmonary:  Pulmonary Normal    Renal/:   Chronic Renal Disease    Hepatic/GI:  Hepatic/GI Normal    Musculoskeletal:   Abscess of foot including toes, left  Gas gangrene of foot     Neurological:  Neurology Normal    Endocrine:    Diabetes           Anesthesia Plan  Type of Anesthesia, risks & benefits discussed:    Anesthesia Type: Gen ETT  Intra-op Monitoring Plan: Standard ASA Monitors  Post Op Pain Control Plan: multimodal analgesia  Induction:  IV  Airway Plan: , Post-Induction  Informed Consent: Informed consent signed with the Patient and all parties understand the risks and agree with anesthesia plan.  All questions answered.   ASA Score: 3    Ready For Surgery From Anesthesia Perspective.     .    Chemistry        Component Value Date/Time     (L) 11/13/2022 0615    K 4.2 11/13/2022 0615    CL 89 (L) 11/13/2022 0615    CO2 24 11/13/2022 0615    BUN 28 (H) 11/13/2022 0615    CREATININE 1.7 (H) 11/13/2022 0615     (H) 11/13/2022 0615        Component Value Date/Time    CALCIUM 8.6 (L) 11/13/2022 0615    ALKPHOS 260 (H) 11/12/2022 1937    AST 43 (H) 11/12/2022 1937    ALT 12 11/12/2022 1937    BILITOT 1.9 (H) 11/12/2022 1937        Lab Results   Component Value Date    WBC 22.30 (H) 11/13/2022    HGB 11.0 (L) 11/13/2022    HCT 32.6 (L) 11/13/2022    MCV 94 11/13/2022     11/13/2022

## 2022-11-13 NOTE — ASSESSMENT & PLAN NOTE
Immediate surgical incision and drainage.  Discussed risk of major limb loss.    The procedure of (incision drainage of left foot with removal of nonviable soft tissue) was thoroughly explained to the patient. Its necessity and/or purpose and the implications therein were outlined, including any pertinent advantages and/or disadvantages, and possible complications, if any. Possible complications include recurrence of pathology and/or deformity, infection (cellulitis, drainage, purulence, malodor, etc...), pain, numbness, neuritis, edema, burning, loss of function, need for further surgery, possible need for removal of any implanted hardware, soft tissue contracture and/or scarring, etc... No guarantees were given and/or implied. Post-operative expectations and weightbearing protocol is thoroughly explained the patient, who acknowledges understanding.

## 2022-11-13 NOTE — PROGRESS NOTES
"Pharmacokinetic Assessment Follow Up: IV Vancomycin    Vancomycin serum concentration assessment(s):    The random level was drawn correctly and can be used to guide therapy at this time. The measurement is below the desired definitive target range of 15 to 20 mcg/mL.    Vancomycin Regimen Plan:    Re-dose when the random level is less than 20 mcg/mL, next level to be drawn at 0000 on 11/14    Drug levels (last 3 results):  Recent Labs   Lab Result Units 11/13/22  0925   Vancomycin, Random ug/mL 14.7       Pharmacy will continue to follow and monitor vancomycin.    Please contact pharmacy at extension [phone] for questions regarding this assessment.    Thank you for the consult,   Carmine Townsend       Patient brief summary:  Joseph Paris Jr. is a 52 y.o. male initiated on antimicrobial therapy with IV Vancomycin for treatment of bone/joint infection    The patient's current regimen is pulse dosing.    Drug Allergies:   Review of patient's allergies indicates:   Allergen Reactions    Lisinopril Swelling    Penicillins Other (See Comments)     Pt unsure of reaction, stating "I just know I'm allergic"       Actual Body Weight:   112 kg    Renal Function:   Estimated Creatinine Clearance: 67.1 mL/min (A) (based on SCr of 1.7 mg/dL (H)).,     Dialysis Method (if applicable):  N/A    CBC (last 72 hours):  Recent Labs   Lab Result Units 11/12/22 1937 11/13/22  0615   WBC K/uL 22.99* 22.30*   Hemoglobin g/dL 11.6* 11.0*   Hematocrit % 34.2* 32.6*   Platelets K/uL 378 367   Gran % % 86.0* 84.0*   Lymph % % 6.7* 6.9*   Mono % % 6.0 7.7   Eosinophil % % 0.1 0.4   Basophil % % 0.3 0.3   Differential Method  Automated Automated       Metabolic Panel (last 72 hours):  Recent Labs   Lab Result Units 11/12/22 1937 11/13/22  0615   Sodium mmol/L 131* 132*   Potassium mmol/L 4.2 4.2   Chloride mmol/L 91* 89*   CO2 mmol/L 23 24   Glucose mg/dL 267* 245*   BUN mg/dL 27* 28*   Creatinine mg/dL 2.0* 1.7*   Albumin g/dL 2.5*  --    Total " Bilirubin mg/dL 1.9*  --    Alkaline Phosphatase U/L 260*  --    AST U/L 43*  --    ALT U/L 12  --        Vancomycin Administrations:  vancomycin given in the last 96 hours                     vancomycin 1.5 g in dextrose 5 % 250 mL IVPB (ready to mix) (mg) 1,500 mg New Bag 11/13/22 1223    vancomycin injection (g) 1 g Given 11/13/22 1117    vancomycin 2 g in dextrose 5 % 500 mL IVPB (mg) 2,000 mg New Bag 11/12/22 2121                    Microbiologic Results:  Microbiology Results (last 7 days)       Procedure Component Value Units Date/Time    Gram stain [[id number]] Collected: 11/13/22 1148    Order Status: Sent Specimen: Abscess from Foot, Left Updated: 11/13/22 1212    Fungus culture [[id number]] Collected: 11/13/22 1148    Order Status: Sent Specimen: Abscess from Foot, Left Updated: 11/13/22 1211    Aerobic culture [[id number]] Collected: 11/13/22 1148    Order Status: Sent Specimen: Abscess from Foot, Left Updated: 11/13/22 1211    AFB Culture & Smear [[id number]] Collected: 11/13/22 1148    Order Status: Sent Specimen: Abscess from Foot, Left Updated: 11/13/22 1211    Culture, Anaerobe [[id number]] Collected: 11/13/22 1148    Order Status: Sent Specimen: Abscess from Foot, Left Updated: 11/13/22 1210    Blood Culture #1 **CANNOT BE ORDERED STAT** [[id number]] Collected: 11/12/22 1941    Order Status: Completed Specimen: Blood from Peripheral, Antecubital, Left Updated: 11/13/22 0945     Blood Culture, Routine No Growth to date    Blood Culture #2 **CANNOT BE ORDERED STAT** [[id number]] Collected: 11/12/22 2021    Order Status: Completed Specimen: Blood from Peripheral, Forearm, Left Updated: 11/13/22 0945     Blood Culture, Routine No Growth to date    Influenza A & B by Molecular [[id number]] Collected: 11/12/22 2027    Order Status: Completed Specimen: Nasopharyngeal Swab Updated: 11/12/22 2102     Influenza A, Molecular Negative     Influenza B, Molecular Negative     Flu A & B Source Nasal swab    Influenza A &  B by Molecular [[id number]] Collected: 11/12/22 1933    Order Status: Canceled Specimen: Nasopharyngeal Swab

## 2022-11-13 NOTE — PLAN OF CARE
VSS. Fall precautions maintained.   Pain is well controlled.   Dressing intact and clean.  Tolerating IV antibiotics.  Blood sugar monitored. Covered per SS as ordered.  NSR on cardiac monitor.  Family at bedside.   All needs met. Will continue to monitor.

## 2022-11-13 NOTE — ASSESSMENT & PLAN NOTE
Management per Podiatry    --S/P I&D 11/13  --Angiogram  --MRI  --Possible return to OR later this week

## 2022-11-13 NOTE — CONSULTS
[facility]  Podiatry  Consult Note    Patient Name: Joseph Paris Jr.  MRN: [id number]  Admission Date: 11/12/2022  Hospital Length of Stay: 0 days  Attending Physician: Kayden Marroquin, *  Primary Care Provider: Thong Hidalgo Jr, MD     Inpatient consult to Podiatry  Consult performed by: Sierra Augustine DPM  Consult ordered by: Denisha Neville MD  Reason for consult: Acute osteomyelitis  Assessment/Recommendations: Gas gangrene present on x-ray imaging requiring immediate surgical incision drainage.  Recommend MRI and noninvasive arterial studies once incision drainage has been performed to determine if osteomyelitis is present.  Will likely need additional procedures once soft tissue has improved and studies have finalized.  Likely Tuesday        Subjective:     History of Present Illness:  Patient is a 52-year-old male with past medical history of hypertension, diabetes mellitus type 2, peripheral neuropathy, peripheral vascular disease (Hx of femoral bypass), history of gastic sleeve present to the emergency department for bilateral foot swelling, fever, and shortness of breath which is worsened over the last week.  He does have a history of multiple wounds to the right left foot.  He was initially seen at our [facility] on 04/26/2022 and total contact casting was performed.  His last appointment at the [facility] was 10/11/2022 in noted to have a left midfoot ulceration.  Per report, patient states that he lost his insurance coverage and was able to follow-up with wound care.  He attempted to perform a self incision drainage to his foot with the hot water massager.  States that following this, the foot became more infected in worsened significantly.  Does have fever shortness of breath.  Chest pain, nausea, vomiting, weakness, dizziness.      Scheduled Meds:   amLODIPine  5 mg Oral Daily    ceFEPime (MAXIPIME) IVPB  2 g Intravenous Q12H    enoxaparin  40 mg  "Subcutaneous Daily    insulin detemir U-100  10 Units Subcutaneous QHS     Continuous Infusions:  PRN Meds:acetaminophen, acetaminophen, glucagon (human recombinant), glucose, glucose, hydrALAZINE, insulin aspart U-100, naloxone, ondansetron, sodium chloride 0.9%, Pharmacy to dose Vancomycin consult **AND** vancomycin - pharmacy to dose    Review of patient's allergies indicates:   Allergen Reactions    Lisinopril Swelling    Penicillins Other (See Comments)     Pt unsure of reaction, stating "I just know I'm allergic"        Past Medical History:   Diagnosis Date    Coronary artery disease     Diabetes mellitus     Esophageal cancer     Hypertension      Past Surgical History:   Procedure Laterality Date    CORONARY ARTERY BYPASS GRAFT      FEMORAL BYPASS         Family History       Problem Relation (Age of Onset)    Cancer Father    Diabetes Sister, Brother    Heart disease Mother    Stroke Mother          Tobacco Use    Smoking status: Former    Smokeless tobacco: Never   Substance and Sexual Activity    Alcohol use: Yes    Drug use: No    Sexual activity: Not on file     Review of Systems   Constitutional:  Positive for chills and fever. Negative for appetite change.   HENT:  Negative for sore throat and trouble swallowing.    Eyes:  Negative for visual disturbance.   Respiratory:  Positive for shortness of breath. Negative for chest tightness.    Cardiovascular:  Positive for leg swelling. Negative for chest pain.   Gastrointestinal:  Negative for diarrhea, nausea and vomiting.   Genitourinary:  Negative for dysuria and flank pain.   Musculoskeletal:  Positive for gait problem and joint swelling. Negative for back pain.   Skin:  Positive for color change and wound. Negative for pallor.   Allergic/Immunologic: Negative for immunocompromised state.   Neurological:  Positive for weakness and numbness. Negative for dizziness and light-headedness.   Psychiatric/Behavioral:  Negative for confusion and " hallucinations.    Objective:     Vital Signs (Most Recent):  Temp: 99.2 °F (37.3 °C) (11/13/22 0754)  Pulse: 92 (11/13/22 0754)  Resp: 16 (11/13/22 0754)  BP: (!) 171/75 (11/13/22 0754)  SpO2: 95 % (11/13/22 0754)   Vital Signs (24h Range):  Temp:  [98.7 °F (37.1 °C)-100.5 °F (38.1 °C)] 99.2 °F (37.3 °C)  Pulse:  [] 92  Resp:  [10-20] 16  SpO2:  [92 %-99 %] 95 %  BP: (107-215)/(55-88) 171/75     Weight: 112 kg (246 lb 14.6 oz)  Body mass index is 30.86 kg/m².    Foot Exam    CONSTITUTIONAL:  Patient is awake, alert, and oriented to person, place, and time.  Patient is well groomed with normal appearance.  No activity change.  No appetite change.    EYES:  Pupils are equal and reactive to light.  No icterus    ENT:  Mucous membranes are moist.  Dentition intact.    PULMONARY:  Breathing is nonlabored.  No wheezing or rales    VASCULAR:  The right dorsalis pedis pulse 2/4 and the right posterior tibial pulse 1/4.  The left dorsalis pedis pulse 2/4 and posterior tibial pulse on the left is 1/4.  Capillary refill is intact.  Pedal hair growth decreased.     NEUROLOGICAL:  Protective sensation is not intact to the right left foot.  Vibratory sensation is diminished.  Proprioception is intact.  Sharp/dull is reduced.    DERMATOLOGICAL:  Large underlying abscess with fluctuance to the left 1st metatarsophalangeal joint progressing along the dorsal aspect of the left 1st metatarsal bone.  Significant soft tissue edema is noted.  Significant underlying abscess is noted.  Small hemostat was placed into a a soft spot in the skin which point a large abscess was expressed.  Proximally 20-30 cc of purulence was removed.    Bilateral stable necrotic wounds present to the plantar heel    MUSCULOSKELETAL:  No gross or structural anatomic deformity      Laboratory:  A1C:   Recent Labs   Lab 07/26/22  1058   HGBA1C 10.8*     ABGs: No results for input(s): PH, PCO2, HCO3, POCSATURATED, BE in the last 168 hours.  Blood Cultures:  No results for input(s): LABBLOO in the last 48 hours.  Cardiac Markers: No results for input(s): CKMB, TROPONINT, MYOGLOBIN in the last 168 hours.  CBC:   Recent Labs   Lab 11/13/22  0615   WBC 22.30*   RBC 3.46*   HGB 11.0*   HCT 32.6*      MCV 94   MCH 31.8*   MCHC 33.7     CPS: {:LNK,LABRCNTIP[  CRP:   Recent Labs   Lab 11/12/22 1937   .2*     ESR:   Recent Labs   Lab 11/12/22 1937   SEDRATE 125*     Wound Cultures: No results for input(s): LABAERO in the last 4320 hours.  Microbiology Results (last 7 days)       Procedure Component Value Units Date/Time    Blood Culture #1 **CANNOT BE ORDERED STAT** [[id number]] Collected: 11/12/22 1941    Order Status: Sent Specimen: Blood from Peripheral, Antecubital, Left Updated: 11/13/22 0241    Blood Culture #2 **CANNOT BE ORDERED STAT** [[id number]] Collected: 11/12/22 2021    Order Status: Sent Specimen: Blood from Peripheral, Forearm, Left Updated: 11/13/22 0241    Influenza A & B by Molecular [[id number]] Collected: 11/12/22 2027    Order Status: Completed Specimen: Nasopharyngeal Swab Updated: 11/12/22 2102     Influenza A, Molecular Negative     Influenza B, Molecular Negative     Flu A & B Source Nasal swab    Influenza A & B by Molecular [[id number]] Collected: 11/12/22 1933    Order Status: Canceled Specimen: Nasopharyngeal Swab           No results for input(s): COLORU, CLARITYU, SPECGRAV, PHUR, PROTEINUA, GLUCOSEU, BILIRUBINCON, BLOODU, WBCU, RBCU, BACTERIA, MUCUS, NITRITE, LEUKOCYTESUR, UROBILINOGEN, HYALINECASTS in the last 168 hours.    Diagnostic Results:  I have reviewed all pertinent imaging results/findings within the past 24 hours.    Clinical Findings:  Diabetic foot infection with possible osteomyelitis and presence of subcutaneous emphysema concerning for gas gangrene/necrotizing fasciitis/abscess      All labs and imaging were reviewed in detail with the patient in the room.    Medical history and chart was thoroughly reviewed.    Plan  discussed with attending provider           Assessment/Plan:     * Acute osteomyelitis of metatarsal bone of left foot  Recommend MRI following surgical intervention for incision and drainage of left foot abscess/gas gangrene.     Recommend noninvasive arterial studies following surgical intervention today    Gas gangrene of foot  X-rays showing soft tissue emphysema which is concerning for gas gangrene versus necrotizing fasciitis.  Immediate surgical incision drainage with open wound packing    Abscess of foot including toes, left  Immediate surgical incision and drainage.  Discussed risk of major limb loss.    The procedure of (incision drainage of left foot with removal of nonviable soft tissue) was thoroughly explained to the patient. Its necessity and/or purpose and the implications therein were outlined, including any pertinent advantages and/or disadvantages, and possible complications, if any. Possible complications include recurrence of pathology and/or deformity, infection (cellulitis, drainage, purulence, malodor, etc...), pain, numbness, neuritis, edema, burning, loss of function, need for further surgery, possible need for removal of any implanted hardware, soft tissue contracture and/or scarring, etc... No guarantees were given and/or implied. Post-operative expectations and weightbearing protocol is thoroughly explained the patient, who acknowledges understanding.    Diabetes mellitus  Discussed the importance of appropriate glycemic control.  Last A1c is 10.8.  Patient does need assistance with long-term diabetic management upon discharge.  Places him at increased risk of major complications and limb loss    Severe sepsis  Secondary to abscess/gas gangrene of the left foot.  Will plan for immediate surgical incision and drainage with removal of nonviable soft tissue on today's procedure.  We did discuss utilization of MRI and arterial studies prior to determining if amputation is needed.  We will  plan to leave the wound open for additional surgical interventions once studies have been performed and allow the soft tissue to improve     Continue IV antibiotics.  Will take intraoperative cultures of the soft tissue and abscess          Sierra Augustine DPM  Podiatry  [facility]

## 2022-11-13 NOTE — OP NOTE
[facility]  Podiatric Medicine & Surgery  Operative Report    SUMMARY     Date of Procedure: 11/13/2022    Surgeon(s) and Role: Surgeon(s) and Role:     * Sierra Augustine DPM - Primary    Pre-Operative Diagnosis: Pre-Op Diagnosis Codes:     * Gas gangrene of foot [A48.0]     * Abscess of foot including toes, left [L02.612]     * Type 2 diabetes mellitus with diabetic polyneuropathy, without long-term current use of insulin [E11.42]    Post-Operative Diagnosis: Post-Op Diagnosis Codes:     * Gas gangrene of foot [A48.0]     * Abscess of foot including toes, left [L02.612]     * Type 2 diabetes mellitus with diabetic polyneuropathy, without long-term current use of insulin [E11.42]    Planned Procedure: Procedure(s):  INCISION AND DRAINAGE, FOOT    Technical Procedures Used:   1. Incision and drainage of left foot, multiple compartment     Anesthesia: Choice    Hemostasis:  None    Estimated Blood Loss (EBL): Minimal    Drains:  Nu Gauze packing soaked with Betadine, fluffs soaked with Betadine    Specimens:  Intraoperative cultures of abscess and soft tissue to the left foot    Implants: * No implants in log *    Complications: * No complications entered in OR log *      Description of the Findings of the Procedure: The patient was seen in the Holding Room. The risks, benefits, complications, treatment options, and expected outcomes were discussed with the patient. The risks and potential complications of their problem and purposed treatment include but are not limited to infection, nerve injury, vascular injury, nonunion/malunion/delayed union of the surgical site, persistent pain, potential skin necrosis, deep vein thrombosis, possible pulmonary embolus, complications of the anesthetics and failure of the implant.  The patient concurred with the proposed plan, giving informed consent. The patient is aware that the procedure may be a part of a staged collection of procedures for  definitive cure and/or alleviation of symptoms. The site of surgery properly noted/marked. Preoperative intravenous antibiotics are hanging at bedside, and are currently being administered via the heparin lock. The patient was taken to Operating Suite.    Once in the operative suite, the patient is transferred onto the operative table in the supine position. A TIME-OUT is taken to identify the proper patient, procedure to be performed, and laterality.  The patient is properly positioned on the operating room table for ease of dissection and for any ancillary imaging.   Nursing and ancillary OR staff prepared the patient for the procedure. The patient is adequately sedated by the Attending Anesthesiologist and/or covering CRNA.    Next, the operative limb was rendered sterile using betadine paint and scrub. Another TIME-OUT is taken as per protocol to identify the proper patient, correct extremity, and procedure to be performed. Once this coincided with all members of the team, the extremity was scrubbed, prepped, and draped in a sterile aseptic fashion.    Attention was directed to the dorsal aspect the left foot where an incision was placed at the area of fluctuance from the 1st metatarsal bone progressing proximally along the 1st metatarsal shaft.  The incision was then carefully dissected with a hemostat through the subcutaneous tissue at which point a large abscess of yellow purulent material was expressed from the 1st intermetatarsal space.  There was substantial devitalization of the subcutaneous tissue to the dorsal flap concerning for soft tissue necrosis and devitalization from gas gangrene/necrotizing fasciitis.  The abscess did track proximally and medially to the medial aspect of the hindfoot.  Was also noted to be tracking along the extensor tendons to the dorsal lateral aspect the proximal mid foot with a large subcutaneous pocket present to superficial soft tissue.  Cultures were taken  intraoperatively.  3000 mL of normal saline mixed with 1 g of vancomycin powder was utilized to flush all the soft tissue and area devitalization.  There was substantial devitalization of the subcutaneous tissue.  There is concerned that the dorsal skin/fat will be nonviable due to the significant subcutaneous necrosis present.  There was little to no bleeding with the incision or dissection without exsanguination or tourniquet.  This point the foot was packed open with Nu Gauze packing and Betadine-soaked 4x4s.  Would recommend allowing the soft tissue to demarcate and assess survivability.  The foot was preoperative injected with 30 cc 0.5% Marcaine plain.  Due to the substantial amount of subcutaneous necrosis of the superficial skin of the foot, there was little room for local anesthetic without seepage through the incision line.  An ankle block was then performed.  Dry sterile gauze, Ace wrap was utilized for dressing changes.    The anesthesia is weaned. There were no complications to this procedure. Any final necessary imaging to be performed in the PACU if it was not performed here in the OR suite.  The patient is transferred to the [facility] bed/stretcher, [facility]. The patient is transferred to the PACU.    Condition: stable    Disposition: PACU - hemodynamically stable.    Attestation: I performed the procedure.

## 2022-11-13 NOTE — ASSESSMENT & PLAN NOTE
Secondary to abscess/gas gangrene of the left foot.  Will plan for immediate surgical incision and drainage with removal of nonviable soft tissue on today's procedure.  We did discuss utilization of MRI and arterial studies prior to determining if amputation is needed.  We will plan to leave the wound open for additional surgical interventions once studies have been performed and allow the soft tissue to improve     Continue IV antibiotics.  Will take intraoperative cultures of the soft tissue and abscess

## 2022-11-13 NOTE — SUBJECTIVE & OBJECTIVE
Interval History: S/P Left foot I&D today, BC: Gram+ cocci in both bottles, antibiotic regimen adjusted, repeat BC tomorrow. Podiatry ordered MRI of foot, recommend angiogram and possible vascular surgery consult.     Review of Systems   Constitutional:  Positive for activity change, appetite change and fatigue. Negative for chills, diaphoresis, fever and unexpected weight change.   HENT:  Negative for congestion, hearing loss, nosebleeds, postnasal drip, rhinorrhea and trouble swallowing.    Eyes:  Negative for discharge and visual disturbance.   Respiratory:  Negative for cough, chest tightness and shortness of breath.    Cardiovascular:  Negative for chest pain, palpitations and leg swelling.   Gastrointestinal:  Positive for abdominal distention. Negative for abdominal pain, constipation, diarrhea, nausea and vomiting.   Endocrine: Negative for cold intolerance and heat intolerance.   Genitourinary:  Negative for difficulty urinating, dysuria, frequency and hematuria.   Musculoskeletal:  Positive for arthralgias, back pain, gait problem and myalgias.   Skin:  Positive for wound (foot, left).   Neurological:  Negative for dizziness, weakness, light-headedness and headaches.   Hematological:  Negative for adenopathy. Does not bruise/bleed easily.   Psychiatric/Behavioral:  The patient is nervous/anxious.    Objective:     Vital Signs (Most Recent):  Temp: 98.5 °F (36.9 °C) (11/13/22 1138)  Pulse: 84 (11/13/22 1200)  Resp: 10 (11/13/22 1200)  BP: (!) 159/80 (11/13/22 1200)  SpO2: 96 % (11/13/22 1200)   Vital Signs (24h Range):  Temp:  [98.5 °F (36.9 °C)-100.5 °F (38.1 °C)] 98.5 °F (36.9 °C)  Pulse:  [] 84  Resp:  [10-20] 10  SpO2:  [92 %-100 %] 96 %  BP: (107-215)/(55-88) 159/80     Weight: 112 kg (246 lb 14.6 oz)  Body mass index is 30.86 kg/m².    Intake/Output Summary (Last 24 hours) at 11/13/2022 1446  Last data filed at 11/13/2022 1134  Gross per 24 hour   Intake 750 ml   Output 400 ml   Net 350 ml       Physical Exam  Vitals and nursing note reviewed.   Constitutional:       General: He is not in acute distress.     Appearance: He is well-developed. He is ill-appearing. He is not diaphoretic.   HENT:      Head: Normocephalic and atraumatic.      Right Ear: Hearing and external ear normal.      Left Ear: Hearing and external ear normal.      Nose: Nose normal. No mucosal edema or rhinorrhea.      Mouth/Throat:      Pharynx: Uvula midline.   Eyes:      General:         Right eye: No discharge.         Left eye: No discharge.      Conjunctiva/sclera: Conjunctivae normal.      Right eye: No chemosis.     Left eye: No chemosis.     Pupils: Pupils are equal, round, and reactive to light.   Neck:      Thyroid: No thyroid mass or thyromegaly.      Trachea: Trachea normal.   Cardiovascular:      Rate and Rhythm: Normal rate and regular rhythm.      Pulses:           Dorsalis pedis pulses are 1+ on the right side.      Heart sounds: Normal heart sounds. No murmur heard.  Pulmonary:      Effort: Pulmonary effort is normal. No respiratory distress.      Breath sounds: Normal breath sounds. No decreased breath sounds or wheezing.   Abdominal:      General: Bowel sounds are normal. There is no distension.      Palpations: Abdomen is soft.      Tenderness: There is no abdominal tenderness.   Musculoskeletal:         General: Normal range of motion.      Cervical back: Normal range of motion and neck supple.   Feet:      Left foot:      Skin integrity: Blister, skin breakdown (necrotic wound) and warmth present.   Lymphadenopathy:      Cervical: No cervical adenopathy.      Upper Body:      Right upper body: No supraclavicular adenopathy.      Left upper body: No supraclavicular adenopathy.   Skin:     General: Skin is warm and dry.      Capillary Refill: Capillary refill takes less than 2 seconds.      Findings: No rash.   Neurological:      Mental Status: He is alert and oriented to person, place, and time.   Psychiatric:          Mood and Affect: Mood is not anxious.         Speech: Speech normal.         Behavior: Behavior normal.         Thought Content: Thought content normal.         Judgment: Judgment normal.       Significant Labs: All pertinent labs within the past 24 hours have been reviewed.  CBC:   Recent Labs   Lab 11/12/22 1937 11/13/22 0615   WBC 22.99* 22.30*   HGB 11.6* 11.0*   HCT 34.2* 32.6*    367     CMP:   Recent Labs   Lab 11/12/22 1937 11/13/22 0615   * 132*   K 4.2 4.2   CL 91* 89*   CO2 23 24   * 245*   BUN 27* 28*   CREATININE 2.0* 1.7*   CALCIUM 9.6 8.6*   PROT 9.0*  --    ALBUMIN 2.5*  --    BILITOT 1.9*  --    ALKPHOS 260*  --    AST 43*  --    ALT 12  --    ANIONGAP 17* 19*       Significant Imaging: I have reviewed all pertinent imaging results/findings within the past 24 hours.

## 2022-11-13 NOTE — ASSESSMENT & PLAN NOTE
Patient with acute kidney injury likely due to IVVD/dehydration and pre-renal azotemia TIERA is currently worsening. Labs reviewed- Renal function/electrolytes with Estimated Creatinine Clearance: 57 mL/min (A) (based on SCr of 2 mg/dL (H)). according to latest data. Monitor urine output and serial BMP and adjust therapy as needed. Avoid nephrotoxins and renally dose meds for GFR listed above.     S/t sepsis vs CKD  IV fluids  Trend Cr

## 2022-11-13 NOTE — ED PROVIDER NOTES
"SCRIBE #1 NOTE: I, Jimmie Pierre, am scribing for, and in the presence of, Delia Preston MD. I have scribed the HPI, ROS, and PEx.     SCRIBE #2 NOTE: I, Francisco Partick, am scribing for, and in the presence of,  Michael Aguirre Jr., MD. I have scribed the remaining portions of the note not scribed by Scribe #1.    History     Chief Complaint   Patient presents with    Leg Swelling     Bilateral feet swelling, developed fever, shortness of breath, has been treating abscesses to feet himself, made holes in his feet to drain the pus and now reports looks more infected.     Review of patient's allergies indicates:   Allergen Reactions    Lisinopril Swelling    Penicillins Other (See Comments)     Pt unsure of reaction, stating "I just know I'm allergic"         History of Present Illness     HPI    11/12/2022, 7:25 PM  History obtained from the patient      History of Present Illness: Joseph Paris Jr. is a 52 y.o. male patient with a PMHx of HTN, DM, and CAD who presents to the Emergency Department for evaluation of Bilateral Feet Swelling which onset gradually within the past week. The pt last saw their wound care three weeks ago. The pt lost his health insurance coverage and decided to take care of the wounds himself. The pt self drained pus from his wounds and used a heated water massager on his feet; reports feet look more infected than before. Symptoms are constant and moderate in severity. No mitigating or exacerbating factors reported. Associated sxs include fever and SOB. Patient denies any CP, N/V, weakness, dysuria, and all other sxs at this time. No further complaints or concerns at this time.       Arrival mode: Personal vehicle     PCP: Thong Hidalgo Jr, MD        Past Medical History:  Past Medical History:   Diagnosis Date    Coronary artery disease     Diabetes mellitus     Esophageal cancer     Hypertension        Past Surgical History:  Past Surgical History:   Procedure Laterality Date    " CORONARY ARTERY BYPASS GRAFT      FEMORAL BYPASS           Family History:  Family History   Problem Relation Age of Onset    Heart disease Mother     Stroke Mother     Cancer Father     Diabetes Sister     Diabetes Brother        Social History:  Social History     Tobacco Use    Smoking status: Former    Smokeless tobacco: Never   Substance and Sexual Activity    Alcohol use: Yes    Drug use: No    Sexual activity: Not on file        Review of Systems     Review of Systems   Constitutional:  Positive for fever. Negative for chills.   HENT:  Negative for congestion and sore throat.    Respiratory:  Positive for shortness of breath. Negative for cough.    Cardiovascular:  Negative for chest pain.   Gastrointestinal:  Negative for abdominal pain, diarrhea, nausea and vomiting.   Genitourinary:  Negative for dysuria.   Musculoskeletal:  Negative for back pain.        (+) Feet swelling   Skin:  Negative for rash.        (+) Abcesses on both feet   Neurological:  Negative for weakness.   Hematological:  Does not bruise/bleed easily.   All other systems reviewed and are negative.     Physical Exam     Initial Vitals [11/12/22 1858]   BP Pulse Resp Temp SpO2   (!) 160/72 (!) 116 20 (!) 100.5 °F (38.1 °C) 98 %      MAP       --          Physical Exam  Nursing Notes and Vital Signs Reviewed.  Constitutional: Patient is in no acute distress. Well-developed and well-nourished.  Head: Atraumatic. Normocephalic.  Eyes: PERRL. EOM intact. Conjunctivae are not pale. No scleral icterus.  ENT: Mucous membranes are moist. Oropharynx is clear and symmetric.    Neck: Supple. Full ROM. No lymphadenopathy.  Cardiovascular: Regular rate. Regular rhythm. No murmurs, rubs, or gallops. Distal pulses are 2+ and symmetric. Bilaterally good pulses in feet.  Pulmonary/Chest: No respiratory distress. Clear to auscultation bilaterally. No wheezing or rales.  Abdominal: Soft and non-distended.  There is no tenderness.  No rebound, guarding, or  rigidity. Good bowel sounds.  Genitourinary: No CVA tenderness  Musculoskeletal: Moves all extremities. No obvious deformities. No edema. No calf tenderness. 1st Metatarsal swollen; no obvious drainage. See attached photos.  Skin: Warm and dry. Quarter sized necrotic ulcer to surface of right heel; no infection.  Neurological:  Alert, awake, and appropriate.  Normal speech.  No acute focal neurological deficits are appreciated.  Psychiatric: Normal affect. Good eye contact. Appropriate in content.               ED Course   Critical Care    Date/Time: 11/12/2022 8:59 PM  Performed by: Michael Aguirre Jr., MD  Authorized by: Michael Aguirre Jr., MD   Direct patient critical care time: 30 minutes  Additional history critical care time: 5 minutes  Ordering / reviewing critical care time: 5 minutes  Documentation critical care time: 5 minutes  Total critical care time (exclusive of procedural time) : 45 minutes  Critical care time was exclusive of separately billable procedures and treating other patients and teaching time.  Critical care was necessary to treat or prevent imminent or life-threatening deterioration of the following conditions: Osteomyelitis.  Critical care was time spent personally by me on the following activities: blood draw for specimens, development of treatment plan with patient or surrogate, evaluation of patient's response to treatment, interpretation of cardiac output measurements, examination of patient, ordering and performing treatments and interventions, ordering and review of laboratory studies, obtaining history from patient or surrogate, ordering and review of radiographic studies, pulse oximetry, re-evaluation of patient's condition and review of old charts.      ED Vital Signs:  Vitals:    11/12/22 1858 11/12/22 1928 11/12/22 1946 11/12/22 1950   BP: (!) 160/72 (!) 161/75     Pulse: (!) 116 106  103   Resp: 20 10     Temp: (!) 100.5 °F (38.1 °C)  (!) 100.5 °F (38.1 °C)    TempSrc: Oral     "  SpO2: 98% (!) 92%     Weight: 112 kg (246 lb 14.6 oz)      Height: 6' 3" (1.905 m)       11/12/22 2018 11/12/22 2028 11/12/22 2118 11/12/22 2217   BP: 126/64 126/64 (!) 118/56 (!) 107/55   Pulse: 103 103 96 86   Resp: 20 18 16 18   Temp:       TempSrc:       SpO2: 99% 99% 99% 98%   Weight:       Height:        11/12/22 2246 11/12/22 2247 11/12/22 2312 11/13/22 0104   BP:  (!) 117/58 131/73    Pulse:  83 85 86   Resp:  18 19    Temp: 100 °F (37.8 °C)  98.8 °F (37.1 °C)    TempSrc: Oral  Oral    SpO2:  99% 95%    Weight:       Height:           Abnormal Lab Results:  Labs Reviewed   CBC W/ AUTO DIFFERENTIAL - Abnormal; Notable for the following components:       Result Value    WBC 22.99 (*)     RBC 3.66 (*)     Hemoglobin 11.6 (*)     Hematocrit 34.2 (*)     MCH 31.7 (*)     RDW 10.9 (*)     Immature Granulocytes 0.9 (*)     Gran # (ANC) 19.8 (*)     Immature Grans (Abs) 0.21 (*)     Mono # 1.4 (*)     Gran % 86.0 (*)     Lymph % 6.7 (*)     All other components within normal limits   COMPREHENSIVE METABOLIC PANEL - Abnormal; Notable for the following components:    Sodium 131 (*)     Chloride 91 (*)     Glucose 267 (*)     BUN 27 (*)     Creatinine 2.0 (*)     Total Protein 9.0 (*)     Albumin 2.5 (*)     Total Bilirubin 1.9 (*)     Alkaline Phosphatase 260 (*)     AST 43 (*)     Anion Gap 17 (*)     eGFR 39 (*)     All other components within normal limits   PROCALCITONIN - Abnormal; Notable for the following components:    Procalcitonin 2.69 (*)     All other components within normal limits   SEDIMENTATION RATE - Abnormal; Notable for the following components:    Sed Rate 125 (*)     All other components within normal limits   C-REACTIVE PROTEIN - Abnormal; Notable for the following components:    .2 (*)     All other components within normal limits   POCT GLUCOSE - Abnormal; Notable for the following components:    POCT Glucose 261 (*)     All other components within normal limits   INFLUENZA A & B BY " MOLECULAR   LACTIC ACID, PLASMA   SARS-COV-2 RNA AMPLIFICATION, QUAL   POCT GLUCOSE MONITORING CONTINUOUS        All Lab Results:  Results for orders placed or performed during the hospital encounter of 11/12/22   Influenza A & B by Molecular    Specimen: Nasopharyngeal Swab   Result Value Ref Range    Influenza A, Molecular Negative Negative    Influenza B, Molecular Negative Negative    Flu A & B Source Nasal swab    CBC auto differential   Result Value Ref Range    WBC 22.99 (H) 3.90 - 12.70 K/uL    RBC 3.66 (L) 4.60 - 6.20 M/uL    Hemoglobin 11.6 (L) 14.0 - 18.0 g/dL    Hematocrit 34.2 (L) 40.0 - 54.0 %    MCV 93 82 - 98 fL    MCH 31.7 (H) 27.0 - 31.0 pg    MCHC 33.9 32.0 - 36.0 g/dL    RDW 10.9 (L) 11.5 - 14.5 %    Platelets 378 150 - 450 K/uL    MPV 9.8 9.2 - 12.9 fL    Immature Granulocytes 0.9 (H) 0.0 - 0.5 %    Gran # (ANC) 19.8 (H) 1.8 - 7.7 K/uL    Immature Grans (Abs) 0.21 (H) 0.00 - 0.04 K/uL    Lymph # 1.5 1.0 - 4.8 K/uL    Mono # 1.4 (H) 0.3 - 1.0 K/uL    Eos # 0.0 0.0 - 0.5 K/uL    Baso # 0.08 0.00 - 0.20 K/uL    nRBC 0 0 /100 WBC    Gran % 86.0 (H) 38.0 - 73.0 %    Lymph % 6.7 (L) 18.0 - 48.0 %    Mono % 6.0 4.0 - 15.0 %    Eosinophil % 0.1 0.0 - 8.0 %    Basophil % 0.3 0.0 - 1.9 %    Poik Slight     Hypo Occasional     Ovalocytes Occasional     Toxic Granulation Present     Differential Method Automated    Comprehensive metabolic panel   Result Value Ref Range    Sodium 131 (L) 136 - 145 mmol/L    Potassium 4.2 3.5 - 5.1 mmol/L    Chloride 91 (L) 95 - 110 mmol/L    CO2 23 23 - 29 mmol/L    Glucose 267 (H) 70 - 110 mg/dL    BUN 27 (H) 6 - 20 mg/dL    Creatinine 2.0 (H) 0.5 - 1.4 mg/dL    Calcium 9.6 8.7 - 10.5 mg/dL    Total Protein 9.0 (H) 6.0 - 8.4 g/dL    Albumin 2.5 (L) 3.5 - 5.2 g/dL    Total Bilirubin 1.9 (H) 0.1 - 1.0 mg/dL    Alkaline Phosphatase 260 (H) 55 - 135 U/L    AST 43 (H) 10 - 40 U/L    ALT 12 10 - 44 U/L    Anion Gap 17 (H) 8 - 16 mmol/L    eGFR 39 (A) >60 mL/min/1.73 m^2   Lactic  acid, plasma   Result Value Ref Range    Lactate (Lactic Acid) 1.9 0.5 - 2.2 mmol/L   Procalcitonin   Result Value Ref Range    Procalcitonin 2.69 (H) <0.25 ng/mL   Sedimentation rate   Result Value Ref Range    Sed Rate 125 (H) 0 - 10 mm/Hr   C-reactive protein   Result Value Ref Range    .2 (H) 0.0 - 8.2 mg/L   COVID-19 Rapid Screening   Result Value Ref Range    SARS-CoV-2 RNA, Amplification, Qual Negative Negative   POCT glucose   Result Value Ref Range    POCT Glucose 261 (H) 70 - 110 mg/dL   POCT glucose   Result Value Ref Range    POCT Glucose 377 (H) 70 - 110 mg/dL        Imaging Results:  Imaging Results               X-Ray Foot Complete Left (Final result)  Result time 11/12/22 19:55:06      Final result by Renzo Christopher MD (11/12/22 19:55:06)                   Impression:      Soft tissue emphysema and irregular margin of the distal aspect of the 1st metatarsal strongly concerning for acute osteomyelitis.    This report was flagged in Epic as abnormal.      Electronically signed by: Renzo Christopher  Date:    11/12/2022  Time:    19:55               Narrative:    EXAMINATION:  XR FOOT COMPLETE 3 VIEW LEFT    CLINICAL HISTORY:  .  Local infection of the skin and subcutaneous tissue, unspecified    TECHNIQUE:  AP, lateral and oblique views of the left foot were performed.    COMPARISON:  None    FINDINGS:  Soft tissue emphysema over the 1st digit and dorsum of the foot strongly concerning for infection.  Irregular margin of the 1st metatarsal concerning for osteomyelitis.  Correlation with MRI with contrast would be advised if clinically feasible and the patient is compatible.                                                  The Emergency Provider reviewed the vital signs and test results, which are outlined above.     ED Discussion     8:00 PM: Dr. Preston transfers care of patient to Dr. Aguirre pending lab results.     8:16 PM: Re-evaluated pt. Pt is resting comfortably and is in no acute  distress. I agree with Dr. Preston's assessment and plan of care. D/w pt all pertinent results. D/w pt any concerns expressed at this time. Answered all questions. Pt expresses understanding at this time.    8:57 PM: Discussed case with Dr. Neville  ([facility]). Dr. Pang agrees with current care and management of pt and accepts admission.   Admitting Service: Hospital Medicine  Admitting Physician: Dr. Pang  Admit to: Obs med surg    8:57 PM: Re-evaluated pt. I have discussed test results, shared treatment plan, and the need for admission with patient and family at bedside. Pt and family express understanding at this time and agree with all information. All questions answered. Pt and family have no further questions or concerns at this time. Pt is ready for admit.         Medical Decision Making:   Clinical Tests:   Lab Tests: Ordered and Reviewed  Radiological Study: Ordered and Reviewed         ED Medication(s):  Medications   cefepime in dextrose 5 % IVPB 2 g (0 g Intravenous Stopped 11/12/22 2054)   vancomycin - pharmacy to dose (has no administration in time range)   lactated ringers infusion ( Intravenous New Bag 11/12/22 2356)   sodium chloride 0.9% flush 10 mL (has no administration in time range)   naloxone 0.4 mg/mL injection 0.02 mg (has no administration in time range)   glucose chewable tablet 16 g (has no administration in time range)   glucose chewable tablet 24 g (has no administration in time range)   glucagon (human recombinant) injection 1 mg (has no administration in time range)   ondansetron injection 4 mg (has no administration in time range)   acetaminophen tablet 650 mg (has no administration in time range)   acetaminophen tablet 650 mg (has no administration in time range)   insulin aspart U-100 pen 1-10 Units (5 Units Subcutaneous Given 11/12/22 2356)   insulin detemir U-100 pen 10 Units (10 Units Subcutaneous Given 11/12/22 2356)   enoxaparin injection 40 mg (has no  administration in time range)   acetaminophen tablet 1,000 mg (1,000 mg Oral Given 11/12/22 1946)   vancomycin 2 g in dextrose 5 % 500 mL IVPB (0 mg Intravenous Stopped 11/12/22 2321)       Current Discharge Medication List                  Scribe Attestation:   Scribe #1: I performed the above scribed service and the documentation accurately describes the services I performed. I attest to the accuracy of the note.     Attending:   Physician Attestation Statement for Scribe #1: I, Delia Preston MD, personally performed the services described in this documentation, as scribed by Jimmie Pierre, in my presence, and it is both accurate and complete.       Scribe Attestation:   Scribe #2: I performed the above scribed service and the documentation accurately describes the services I performed. I attest to the accuracy of the note.    Attending Attestation:           Physician Attestation for Scribe:    Physician Attestation Statement for Scribe #2: I, Michael Aguirre Jr., MD, reviewed documentation, as scribed by Francisco Patrick in my presence, and it is both accurate and complete. I also acknowledge and confirm the content of the note done by Scribe #1.         Clinical Impression       ICD-10-CM ICD-9-CM   1. Left foot infection  L08.9 686.9   2. Acute osteomyelitis of left foot  M86.172 730.07   3. Chest pain  R07.9 786.50       Disposition:   Disposition: Placed in Observation  Condition: Wili Aguirre Jr., MD  11/13/22 7988

## 2022-11-13 NOTE — HOSPITAL COURSE
S/p I&D of foot per Podiatry, recommended MRI and angio with possible referral to vascular surgery. Concerns for viability of flap due to appearance of wounds. Will likely need to return to the OR later this week. Wound cultures collected during procedure, Blood culture: gram positive cocci in chains in both bottles, adjusted antibiotic regimen, added vancomycin. On 11/15- Blood cultures ultimately grew MDR group B strep, but sensitive to both Rocephin and Vancomycin. Staphylococcus speciated as well in blood, but sensitivities still pending. Angiogram ordered by Vascular surgery tentatively scheduled to be done on 11/15. Cannulization in R femoral artery successful. Patient reports notable improvement in his limb swelling and pain immediately after procedure. Further debridement tentatively scheduled for 11/17. Tolerated procedure well. Wound vac placed at that time as well. PICC line inserted on 11/18/22. Definitive antibiotic recs placed by ID (see their note for specifics). Total duration of therapy 6 weeks.  Wound care following for wound vac management. Awaiting insurance approval for home abx, wound vac .

## 2022-11-13 NOTE — ASSESSMENT & PLAN NOTE
Patient's FSGs are uncontrolled due to hyperglycemia on current medication regimen.  Last A1c reviewed-   Lab Results   Component Value Date    HGBA1C 10.8 (H) 07/26/2022     Most recent fingerstick glucose reviewed-   Recent Labs   Lab 11/12/22  1941   POCTGLUCOSE 261*     Current correctional scale  Medium  Maintain anti-hyperglycemic dose as follows-   Antihyperglycemics (From admission, onward)    Start     Stop Route Frequency Ordered    11/12/22 2330  insulin detemir U-100 pen 10 Units         -- SubQ Nightly 11/12/22 2232    11/12/22 2325  insulin aspart U-100 pen 1-10 Units         -- SubQ Before meals & nightly PRN 11/12/22 2232        Hold Oral hypoglycemics while patient is in the hospital.

## 2022-11-13 NOTE — ASSESSMENT & PLAN NOTE
Patient's FSGs are uncontrolled due to hyperglycemia on current medication regimen.  Last A1c reviewed-   Lab Results   Component Value Date    HGBA1C 10.8 (H) 07/26/2022     Most recent fingerstick glucose reviewed-   Recent Labs   Lab 11/12/22  1941 11/12/22  2330 11/13/22  0523 11/13/22  1140   POCTGLUCOSE 261* 377* 246* 194*     Current correctional scale  Medium  Maintain anti-hyperglycemic dose as follows-   Antihyperglycemics (From admission, onward)    Start     Stop Route Frequency Ordered    11/12/22 2330  insulin detemir U-100 pen 10 Units         -- SubQ Nightly 11/12/22 2232    11/12/22 2325  insulin aspart U-100 pen 1-10 Units         -- SubQ Before meals & nightly PRN 11/12/22 2232        Hold Oral hypoglycemics while patient is in the hospital.

## 2022-11-14 PROBLEM — L89.619 DECUBITUS ULCER OF RIGHT HEEL: Status: ACTIVE | Noted: 2022-11-14

## 2022-11-14 LAB
ANION GAP SERPL CALC-SCNC: 11 MMOL/L (ref 8–16)
BASOPHILS # BLD AUTO: 0.05 K/UL (ref 0–0.2)
BASOPHILS NFR BLD: 0.3 % (ref 0–1.9)
BUN SERPL-MCNC: 26 MG/DL (ref 6–20)
CALCIUM SERPL-MCNC: 8.6 MG/DL (ref 8.7–10.5)
CHLORIDE SERPL-SCNC: 91 MMOL/L (ref 95–110)
CO2 SERPL-SCNC: 28 MMOL/L (ref 23–29)
CREAT SERPL-MCNC: 1.4 MG/DL (ref 0.5–1.4)
DIFFERENTIAL METHOD: ABNORMAL
EOSINOPHIL # BLD AUTO: 0.2 K/UL (ref 0–0.5)
EOSINOPHIL NFR BLD: 1.2 % (ref 0–8)
ERYTHROCYTE [DISTWIDTH] IN BLOOD BY AUTOMATED COUNT: 11 % (ref 11.5–14.5)
EST. GFR  (NO RACE VARIABLE): 60 ML/MIN/1.73 M^2
GLUCOSE SERPL-MCNC: 263 MG/DL (ref 70–110)
HCT VFR BLD AUTO: 27.9 % (ref 40–54)
HGB BLD-MCNC: 9.4 G/DL (ref 14–18)
IMM GRANULOCYTES # BLD AUTO: 0.15 K/UL (ref 0–0.04)
IMM GRANULOCYTES NFR BLD AUTO: 0.9 % (ref 0–0.5)
LYMPHOCYTES # BLD AUTO: 1.9 K/UL (ref 1–4.8)
LYMPHOCYTES NFR BLD: 11.9 % (ref 18–48)
MCH RBC QN AUTO: 31.3 PG (ref 27–31)
MCHC RBC AUTO-ENTMCNC: 33.7 G/DL (ref 32–36)
MCV RBC AUTO: 93 FL (ref 82–98)
MONOCYTES # BLD AUTO: 1.5 K/UL (ref 0.3–1)
MONOCYTES NFR BLD: 9 % (ref 4–15)
NEUTROPHILS # BLD AUTO: 12.4 K/UL (ref 1.8–7.7)
NEUTROPHILS NFR BLD: 76.7 % (ref 38–73)
NRBC BLD-RTO: 0 /100 WBC
PLATELET # BLD AUTO: 406 K/UL (ref 150–450)
PMV BLD AUTO: 10 FL (ref 9.2–12.9)
POCT GLUCOSE: 240 MG/DL (ref 70–110)
POCT GLUCOSE: 261 MG/DL (ref 70–110)
POCT GLUCOSE: 268 MG/DL (ref 70–110)
POTASSIUM SERPL-SCNC: 3.7 MMOL/L (ref 3.5–5.1)
RBC # BLD AUTO: 3 M/UL (ref 4.6–6.2)
SODIUM SERPL-SCNC: 130 MMOL/L (ref 136–145)
VANCOMYCIN SERPL-MCNC: 15.3 UG/ML
VANCOMYCIN SERPL-MCNC: 9.8 UG/ML
WBC # BLD AUTO: 16.14 K/UL (ref 3.9–12.7)

## 2022-11-14 PROCEDURE — 21400001 HC TELEMETRY ROOM

## 2022-11-14 PROCEDURE — 63600175 PHARM REV CODE 636 W HCPCS: Performed by: PODIATRIST

## 2022-11-14 PROCEDURE — 85025 COMPLETE CBC W/AUTO DIFF WBC: CPT | Performed by: PODIATRIST

## 2022-11-14 PROCEDURE — 25000003 PHARM REV CODE 250: Performed by: PODIATRIST

## 2022-11-14 PROCEDURE — 25000003 PHARM REV CODE 250: Performed by: INTERNAL MEDICINE

## 2022-11-14 PROCEDURE — 36415 COLL VENOUS BLD VENIPUNCTURE: CPT | Performed by: INTERNAL MEDICINE

## 2022-11-14 PROCEDURE — 94760 N-INVAS EAR/PLS OXIMETRY 1: CPT

## 2022-11-14 PROCEDURE — 63600175 PHARM REV CODE 636 W HCPCS: Performed by: INTERNAL MEDICINE

## 2022-11-14 PROCEDURE — 87040 BLOOD CULTURE FOR BACTERIA: CPT | Mod: 59 | Performed by: NURSE PRACTITIONER

## 2022-11-14 PROCEDURE — 80202 ASSAY OF VANCOMYCIN: CPT | Mod: 91 | Performed by: INTERNAL MEDICINE

## 2022-11-14 PROCEDURE — 99233 SBSQ HOSP IP/OBS HIGH 50: CPT | Mod: ,,, | Performed by: STUDENT IN AN ORGANIZED HEALTH CARE EDUCATION/TRAINING PROGRAM

## 2022-11-14 PROCEDURE — 80048 BASIC METABOLIC PNL TOTAL CA: CPT | Performed by: PODIATRIST

## 2022-11-14 PROCEDURE — 25000003 PHARM REV CODE 250: Performed by: STUDENT IN AN ORGANIZED HEALTH CARE EDUCATION/TRAINING PROGRAM

## 2022-11-14 PROCEDURE — 99233 PR SUBSEQUENT HOSPITAL CARE,LEVL III: ICD-10-PCS | Mod: ,,, | Performed by: STUDENT IN AN ORGANIZED HEALTH CARE EDUCATION/TRAINING PROGRAM

## 2022-11-14 PROCEDURE — 63600175 PHARM REV CODE 636 W HCPCS: Performed by: STUDENT IN AN ORGANIZED HEALTH CARE EDUCATION/TRAINING PROGRAM

## 2022-11-14 PROCEDURE — 36415 COLL VENOUS BLD VENIPUNCTURE: CPT | Performed by: PODIATRIST

## 2022-11-14 PROCEDURE — 80202 ASSAY OF VANCOMYCIN: CPT | Performed by: INTERNAL MEDICINE

## 2022-11-14 RX ORDER — CHOLESTYRAMINE 4 G/9G
1 POWDER, FOR SUSPENSION ORAL 2 TIMES DAILY
Status: COMPLETED | OUTPATIENT
Start: 2022-11-14 | End: 2022-11-15

## 2022-11-14 RX ADMIN — VANCOMYCIN HYDROCHLORIDE 1250 MG: 1.25 INJECTION, POWDER, LYOPHILIZED, FOR SOLUTION INTRAVENOUS at 02:11

## 2022-11-14 RX ADMIN — ENOXAPARIN SODIUM 40 MG: 40 INJECTION SUBCUTANEOUS at 05:11

## 2022-11-14 RX ADMIN — VANCOMYCIN HYDROCHLORIDE 1500 MG: 1.5 INJECTION, POWDER, LYOPHILIZED, FOR SOLUTION INTRAVENOUS at 05:11

## 2022-11-14 RX ADMIN — INSULIN ASPART 6 UNITS: 100 INJECTION, SOLUTION INTRAVENOUS; SUBCUTANEOUS at 06:11

## 2022-11-14 RX ADMIN — CEFEPIME HYDROCHLORIDE 2 G: 2 INJECTION, SOLUTION INTRAVENOUS at 11:11

## 2022-11-14 RX ADMIN — INSULIN ASPART 4 UNITS: 100 INJECTION, SOLUTION INTRAVENOUS; SUBCUTANEOUS at 05:11

## 2022-11-14 RX ADMIN — CEFEPIME HYDROCHLORIDE 2 G: 2 INJECTION, SOLUTION INTRAVENOUS at 03:11

## 2022-11-14 RX ADMIN — INSULIN ASPART 6 UNITS: 100 INJECTION, SOLUTION INTRAVENOUS; SUBCUTANEOUS at 12:11

## 2022-11-14 RX ADMIN — CHOLESTYRAMINE 4 G: 4 POWDER, FOR SUSPENSION ORAL at 09:11

## 2022-11-14 RX ADMIN — INSULIN ASPART 3 UNITS: 100 INJECTION, SOLUTION INTRAVENOUS; SUBCUTANEOUS at 11:11

## 2022-11-14 RX ADMIN — CEFEPIME HYDROCHLORIDE 2 G: 2 INJECTION, SOLUTION INTRAVENOUS at 09:11

## 2022-11-14 RX ADMIN — Medication 1 TABLET: at 05:11

## 2022-11-14 RX ADMIN — INSULIN DETEMIR 10 UNITS: 100 INJECTION, SOLUTION SUBCUTANEOUS at 09:11

## 2022-11-14 RX ADMIN — AMLODIPINE BESYLATE 5 MG: 5 TABLET ORAL at 09:11

## 2022-11-14 NOTE — H&P (VIEW-ONLY)
"[facility]  Vascular Surgery  Consult Note    Inpatient consult to Vascular Surgery  Consult performed by: Dhruv Patrick MD  Consult ordered by: Sierra Augustine DPM      Subjective:     Chief Complaint/Reason for Admission: diabetic foot wounds/abscess    History of Present Illness: 52 y.o. male with a PMHx of HTN, DM, and CAD presents to the ED on 11/12/2022 for anahy feet swelling and deteriorating wounds.  The pt last saw their wound care three weeks ago. The pt lost his health insurance coverage and decided to take care of the wounds himself. The pt self drained pus from his wounds and used a heated water massager on his feet; reports feet look more infected than before    Pt was seen by Podiatry/Esquivel and underwent left foot I/D yesterday.  He does also have a dry necrotic right plantar heel ulcer    Medications Prior to Admission   Medication Sig Dispense Refill Last Dose    blood sugar diagnostic Strp 2 (two) times daily.       calcium citrate-vitamin D3 315-250 mg-unit Tab Take 1 tablet by mouth once daily.       dulaglutide 0.75 mg/0.5 mL PnIj Inject 0.75 mg into the skin every 7 days.       empagliflozin 25 mg Tab Take 25 mg by mouth once daily.       gabapentin (NEURONTIN) 300 MG capsule Take by mouth every evening.       glipiZIDE (GLUCOTROL) 5 MG TR24 Take 5 mg by mouth once daily.       losartan-hydrochlorothiazide 50-12.5 mg (HYZAAR) 50-12.5 mg per tablet Take 1 tablet by mouth once daily.       metformin (GLUCOPHAGE-XR) 750 MG 24 hr tablet Take 1,500 mg by mouth daily with breakfast.       omeprazole (PRILOSEC) 20 MG capsule Take 1 capsule by mouth once daily.       pravastatin (PRAVACHOL) 20 MG tablet Take 20 mg by mouth once daily.          Review of patient's allergies indicates:   Allergen Reactions    Lisinopril Swelling    Penicillins Other (See Comments)     Pt unsure of reaction, stating "I just know I'm allergic"       Past Medical History:   Diagnosis Date    Coronary artery disease  "    Diabetes mellitus     Esophageal cancer     Hypertension      Past Surgical History:   Procedure Laterality Date    CORONARY ARTERY BYPASS GRAFT      FEMORAL BYPASS      INCISION AND DRAINAGE FOOT Left 11/13/2022    Procedure: INCISION AND DRAINAGE, FOOT;  Surgeon: Sierra Augustine DPM;  Location: [facility];  Service: Podiatry;  Laterality: Left;     Family History       Problem Relation (Age of Onset)    Cancer Father    Diabetes Sister, Brother    Heart disease Mother    Stroke Mother          Tobacco Use    Smoking status: Former    Smokeless tobacco: Never   Substance and Sexual Activity    Alcohol use: Yes    Drug use: No    Sexual activity: Not on file     Review of Systems  Objective:     Vital Signs (Most Recent):  Temp: 99.3 °F (37.4 °C) (11/14/22 1246)  Pulse: 81 (11/14/22 1246)  Resp: 17 (11/14/22 1246)  BP: 139/65 (11/14/22 1246)  SpO2: 95 % (11/14/22 1246) Vital Signs (24h Range):  Temp:  [98 °F (36.7 °C)-99.9 °F (37.7 °C)] 99.3 °F (37.4 °C)  Pulse:  [77-97] 81  Resp:  [17-18] 17  SpO2:  [94 %-99 %] 95 %  BP: (122-156)/(60-75) 139/65     Weight: 112 kg (246 lb 14.6 oz)  Body mass index is 30.86 kg/m².    Date 11/14/22 0700 - 11/15/22 0659   Shift 7942-1994 7768-6088 2196-0899 24 Hour Total   INTAKE   P.O. 120   120   Shift Total(mL/kg) 120(1.1)   120(1.1)   OUTPUT   Shift Total(mL/kg)       Weight (kg) 112 112 112 112       Physical Exam    Significant Labs:  CBC:   Recent Labs   Lab 11/14/22  0536   WBC 16.14*   RBC 3.00*   HGB 9.4*   HCT 27.9*      MCV 93   MCH 31.3*   MCHC 33.7     CMP:   Recent Labs   Lab 11/12/22  1937 11/13/22  0615 11/14/22  0536   *   < > 263*   CALCIUM 9.6   < > 8.6*   ALBUMIN 2.5*  --   --    PROT 9.0*  --   --    *   < > 130*   K 4.2   < > 3.7   CO2 23   < > 28   CL 91*   < > 91*   BUN 27*   < > 26*   CREATININE 2.0*   < > 1.4   ALKPHOS 260*  --   --    ALT 12  --   --    AST 43*  --   --    BILITOT 1.9*  --   --     < > = values in this interval not  displayed.     Coagulation: No results for input(s): LABPROT, INR, APTT in the last 48 hours.    Significant Diagnostics:  I have reviewed all pertinent imaging results/findings within the past 24 hours.    Assessment/Plan:     Active Diagnoses:    Diagnosis Date Noted POA    PRINCIPAL PROBLEM:  Acute osteomyelitis of metatarsal bone of left foot [M86.172] 11/12/2022 Yes    Gas gangrene of foot [A48.0] 11/13/2022 Yes    Severe sepsis [A41.9, R65.20] 11/12/2022 Yes    TIERA (acute kidney injury) [N17.9] 11/12/2022 Yes    Diabetes mellitus [E11.9]  Yes      Problems Resolved During this Admission:    Diagnosis Date Noted Date Resolved POA    Abscess of foot including toes, left [L02.612] 11/13/2022 11/13/2022 Yes       Thank you for your consult.  51 y/o male DM/HTN/CAD with left foot diabetic abscess now s/p I/D.  Very likely has significant PAD/Arterial duplex suggestive of multilevel disease. Pt would benefit from bilateral LE angiogram possible left leg intervention.  Will place on schedule for tomorrow/Wednesday  Cont local wound care/ABX per primary team    Dhruv Patrick MD  Vascular Surgery  [facility]

## 2022-11-14 NOTE — CONSULTS
"[facility] Surg  Vascular Surgery  Consult Note    Inpatient consult to Vascular Surgery  Consult performed by: Dhruv Patrick MD  Consult ordered by: Sierra Augustine DPM      Subjective:     Chief Complaint/Reason for Admission: diabetic foot wounds/abscess    History of Present Illness: 52 y.o. male with a PMHx of HTN, DM, and CAD presents to the ED on 11/12/2022 for anahy feet swelling and deteriorating wounds.  The pt last saw their wound care three weeks ago. The pt lost his health insurance coverage and decided to take care of the wounds himself. The pt self drained pus from his wounds and used a heated water massager on his feet; reports feet look more infected than before    Pt was seen by Podiatry/Esquivel and underwent left foot I/D yesterday.  He does also have a dry necrotic right plantar heel ulcer    Medications Prior to Admission   Medication Sig Dispense Refill Last Dose    blood sugar diagnostic Strp 2 (two) times daily.       calcium citrate-vitamin D3 315-250 mg-unit Tab Take 1 tablet by mouth once daily.       dulaglutide 0.75 mg/0.5 mL PnIj Inject 0.75 mg into the skin every 7 days.       empagliflozin 25 mg Tab Take 25 mg by mouth once daily.       gabapentin (NEURONTIN) 300 MG capsule Take by mouth every evening.       glipiZIDE (GLUCOTROL) 5 MG TR24 Take 5 mg by mouth once daily.       losartan-hydrochlorothiazide 50-12.5 mg (HYZAAR) 50-12.5 mg per tablet Take 1 tablet by mouth once daily.       metformin (GLUCOPHAGE-XR) 750 MG 24 hr tablet Take 1,500 mg by mouth daily with breakfast.       omeprazole (PRILOSEC) 20 MG capsule Take 1 capsule by mouth once daily.       pravastatin (PRAVACHOL) 20 MG tablet Take 20 mg by mouth once daily.          Review of patient's allergies indicates:   Allergen Reactions    Lisinopril Swelling    Penicillins Other (See Comments)     Pt unsure of reaction, stating "I just know I'm allergic"       Past Medical History:   Diagnosis Date    Coronary artery disease  "    Diabetes mellitus     Esophageal cancer     Hypertension      Past Surgical History:   Procedure Laterality Date    CORONARY ARTERY BYPASS GRAFT      FEMORAL BYPASS      INCISION AND DRAINAGE FOOT Left 11/13/2022    Procedure: INCISION AND DRAINAGE, FOOT;  Surgeon: Sierra Augustine DPM;  Location: [facility];  Service: Podiatry;  Laterality: Left;     Family History       Problem Relation (Age of Onset)    Cancer Father    Diabetes Sister, Brother    Heart disease Mother    Stroke Mother          Tobacco Use    Smoking status: Former    Smokeless tobacco: Never   Substance and Sexual Activity    Alcohol use: Yes    Drug use: No    Sexual activity: Not on file     Review of Systems  Objective:     Vital Signs (Most Recent):  Temp: 99.3 °F (37.4 °C) (11/14/22 1246)  Pulse: 81 (11/14/22 1246)  Resp: 17 (11/14/22 1246)  BP: 139/65 (11/14/22 1246)  SpO2: 95 % (11/14/22 1246) Vital Signs (24h Range):  Temp:  [98 °F (36.7 °C)-99.9 °F (37.7 °C)] 99.3 °F (37.4 °C)  Pulse:  [77-97] 81  Resp:  [17-18] 17  SpO2:  [94 %-99 %] 95 %  BP: (122-156)/(60-75) 139/65     Weight: 112 kg (246 lb 14.6 oz)  Body mass index is 30.86 kg/m².    Date 11/14/22 0700 - 11/15/22 0659   Shift 3901-5818 6942-1257 9452-0337 24 Hour Total   INTAKE   P.O. 120   120   Shift Total(mL/kg) 120(1.1)   120(1.1)   OUTPUT   Shift Total(mL/kg)       Weight (kg) 112 112 112 112       Physical Exam    Significant Labs:  CBC:   Recent Labs   Lab 11/14/22  0536   WBC 16.14*   RBC 3.00*   HGB 9.4*   HCT 27.9*      MCV 93   MCH 31.3*   MCHC 33.7     CMP:   Recent Labs   Lab 11/12/22  1937 11/13/22  0615 11/14/22  0536   *   < > 263*   CALCIUM 9.6   < > 8.6*   ALBUMIN 2.5*  --   --    PROT 9.0*  --   --    *   < > 130*   K 4.2   < > 3.7   CO2 23   < > 28   CL 91*   < > 91*   BUN 27*   < > 26*   CREATININE 2.0*   < > 1.4   ALKPHOS 260*  --   --    ALT 12  --   --    AST 43*  --   --    BILITOT 1.9*  --   --     < > = values in this interval not  displayed.     Coagulation: No results for input(s): LABPROT, INR, APTT in the last 48 hours.    Significant Diagnostics:  I have reviewed all pertinent imaging results/findings within the past 24 hours.    Assessment/Plan:     Active Diagnoses:    Diagnosis Date Noted POA    PRINCIPAL PROBLEM:  Acute osteomyelitis of metatarsal bone of left foot [M86.172] 11/12/2022 Yes    Gas gangrene of foot [A48.0] 11/13/2022 Yes    Severe sepsis [A41.9, R65.20] 11/12/2022 Yes    TIERA (acute kidney injury) [N17.9] 11/12/2022 Yes    Diabetes mellitus [E11.9]  Yes      Problems Resolved During this Admission:    Diagnosis Date Noted Date Resolved POA    Abscess of foot including toes, left [L02.612] 11/13/2022 11/13/2022 Yes       Thank you for your consult.  51 y/o male DM/HTN/CAD with left foot diabetic abscess now s/p I/D.  Very likely has significant PAD/Arterial duplex suggestive of multilevel disease. Pt would benefit from bilateral LE angiogram possible left leg intervention.  Will place on schedule for tomorrow/Wednesday  Cont local wound care/ABX per primary team    Dhruv Patrick MD  Vascular Surgery  [facility] - Med Surg

## 2022-11-14 NOTE — CONSULTS
Dressing change to the left foot changed per MD orders. I/D area dressing with soaked betadine fluffed gauze, 2 puncture wounds packed with 1/4 gauze strips, Kerlex and Ace wrap covering. Right foot/heel eschar. Informed Dr Augustine of this and picture placed in chart. Dr Augustine already area and wants to paint with betadine. Right heel painted with betadine and covered with gauze and ace wrap per patient request for comfort. Also patient asked if he could walk on feet. I instructed patient to not until primary nurse speaks with MD. Charlene primary nurse informed and will ask MD.

## 2022-11-14 NOTE — NURSING
Diabetes Education  Author: Deepti Anthony, RN  Date: 11/14/2022      Patient A1C level was 10.8 on 7/26/22. Patient states that his Provider following him and his diabetes is at [facility]. Patient states the provider has adjusted medication and placed him on Ozempic injection recently however he quit his job and lost insurance coverage a couple weeks ago and unable to afford the injections. He does have his Metformin and taking that medication as ordered. He has reached out to his provider to inform him that he no longer has insurance coverage and will be unable to refill ozempic and see if there is anything more affordable to be placed on. Patient states he did take his last ozempic dose a couple days ago and it is a once a week injection. He admits that he eats a diabetic diet most days, however he had a gastric bypass about 15 years ago so he doesn't always follow a strict diabetic diet. He verbalized the understanding of importance of controlling blood sugar levels and wound healing. My information given to patient to call for any additional needs.                   Health Maintenance was reviewed today with patient. Discussed with patient importance of routine eye exams, foot exams/foot care, blood work (i.e.: A1c, microalbumin, and lipid), dental visits, yearly flu vaccine, and pneumonia vaccine as indicated by PCP. Patient verbalized understanding.     Health Maintenance Topics with due status: Not Due       Topic Last Completion Date    Lipid Panel 04/05/2022    Low Dose Statin 11/07/2022    Foot Exam 11/12/2022     Health Maintenance Due   Topic Date Due    Hepatitis C Screening  Never done    Diabetes Urine Screening  Never done    Eye Exam  Never done    HIV Screening  Never done    TETANUS VACCINE  Never done    Pneumococcal Vaccines (Age 0-64) (2 - PCV) 01/01/2009    Colorectal Cancer Screening  Never done    COVID-19 Vaccine (4 - Booster for Pfizer series) 02/21/2022     Influenza Vaccine (1) 09/01/2022    Hemoglobin A1c  10/26/2022                                                                                                    Today's Self-Management Care Plan was developed with the patient's input and is based on barriers identified during today's assessment.    The long and short-term goals in the care plan were written with the patient/caregiver's input. The patient has agreed to work toward these goals to improve his overall diabetes control.      The patient received a copy of today's self-management plan and verbalized understanding of the care plan, goals, and all of today's instructions.      The patient was encouraged to communicate with his physician and care team regarding his condition(s) and treatment.  I provided the patient with my contact information today and encouraged him to contact me via phone or patient portal as needed.

## 2022-11-14 NOTE — PLAN OF CARE
IV ABT therapy remains in progress. No adverse reactions noted, remains afebrile. Dressing to LLE C/D/I. Dressing to RLE C/D/I. Accu checks AC&HS, insulin adm as ordered per sliding scale. Voices no c/o's of pain @ this time. Remains free from incident/injury. Call light in reach

## 2022-11-14 NOTE — PROGRESS NOTES
"Pharmacokinetic Assessment Follow Up: IV Vancomycin    Vancomycin serum concentration assessment(s):    The random level was drawn correctly and can be used to guide therapy at this time. The measurement is within the desired definitive target range of 15 to 20 mcg/mL.    Vancomycin Regimen Plan:    Continue pulse dosing regimen with a one time dose of vancomycin 1250 mg.     Re-dose when the random level is less than 20 mcg/mL, next level to be drawn at 1400 on 11/14    Drug levels (last 3 results):  Recent Labs   Lab Result Units 11/13/22  0925 11/14/22  0011   Vancomycin, Random ug/mL 14.7 15.3       Pharmacy will continue to follow and monitor vancomycin.    Please contact pharmacy at extension [phone] for questions regarding this assessment.    Thank you for the consult,   Rita Acosta       Patient brief summary:  Joseph Paris Jr. is a 52 y.o. male initiated on antimicrobial therapy with IV Vancomycin for treatment of bone/joint infection    The patient's current regimen is pulse dosing    Drug Allergies:   Review of patient's allergies indicates:   Allergen Reactions    Lisinopril Swelling    Penicillins Other (See Comments)     Pt unsure of reaction, stating "I just know I'm allergic"       Actual Body Weight:   112 kg    Renal Function:   Estimated Creatinine Clearance: 67.1 mL/min (A) (based on SCr of 1.7 mg/dL (H)).,     Dialysis Method (if applicable):  N/A    CBC (last 72 hours):  Recent Labs   Lab Result Units 11/12/22 1937 11/13/22  0615   WBC K/uL 22.99* 22.30*   Hemoglobin g/dL 11.6* 11.0*   Hematocrit % 34.2* 32.6*   Platelets K/uL 378 367   Gran % % 86.0* 84.0*   Lymph % % 6.7* 6.9*   Mono % % 6.0 7.7   Eosinophil % % 0.1 0.4   Basophil % % 0.3 0.3   Differential Method  Automated Automated       Metabolic Panel (last 72 hours):  Recent Labs   Lab Result Units 11/12/22 1937 11/13/22  0615   Sodium mmol/L 131* 132*   Potassium mmol/L 4.2 4.2   Chloride mmol/L 91* 89*   CO2 mmol/L 23 24   Glucose " mg/dL 267* 245*   BUN mg/dL 27* 28*   Creatinine mg/dL 2.0* 1.7*   Albumin g/dL 2.5*  --    Total Bilirubin mg/dL 1.9*  --    Alkaline Phosphatase U/L 260*  --    AST U/L 43*  --    ALT U/L 12  --        Vancomycin Administrations:  vancomycin given in the last 96 hours                     vancomycin 1.5 g in dextrose 5 % 250 mL IVPB (ready to mix) (mg) 1,500 mg New Bag 11/13/22 1223    vancomycin injection (g) 1 g Given 11/13/22 1117    vancomycin 2 g in dextrose 5 % 500 mL IVPB (mg) 2,000 mg New Bag 11/12/22 2121                    Microbiologic Results:  Microbiology Results (last 7 days)       Procedure Component Value Units Date/Time    Gram stain [[id number]] Collected: 11/13/22 1148    Order Status: Completed Specimen: Abscess from Foot, Left Updated: 11/13/22 2106     Gram Stain Result Rare WBC's      Rare Gram positive cocci      Rare Gram negative rods    Narrative:      Left Foot Abscess    Aerobic culture [[id number]] Collected: 11/13/22 1148    Order Status: Sent Specimen: Abscess from Foot, Left Updated: 11/13/22 1956    AFB Culture & Smear [[id number]] Collected: 11/13/22 1148    Order Status: Sent Specimen: Abscess from Foot, Left Updated: 11/13/22 1951    Fungus culture [[id number]] Collected: 11/13/22 1148    Order Status: Sent Specimen: Abscess from Foot, Left Updated: 11/13/22 1951    Culture, Anaerobe [[id number]] Collected: 11/13/22 1148    Order Status: Sent Specimen: Abscess from Foot, Left Updated: 11/13/22 1951    Blood Culture #2 **CANNOT BE ORDERED STAT** [[id number]] Collected: 11/12/22 2021    Order Status: Completed Specimen: Blood from Peripheral, Forearm, Left Updated: 11/13/22 1449     Blood Culture, Routine Gram stain silva bottle: Gram positive cocci in chains resembling Strep      Results called to and read back by:Delores Zhou RN 11/13/2022  13:16      Gram stain aer bottle: Gram positive cocci in chains resembling Strep      Positive results previously called 11/13/2022  14:48    Blood  Culture #1 **CANNOT BE ORDERED STAT** [[id number]] Collected: 11/12/22 1941    Order Status: Completed Specimen: Blood from Peripheral, Antecubital, Left Updated: 11/13/22 1447     Blood Culture, Routine Gram stain silva bottle: Gram positive cocci in chains resembling Strep      Results called to and read back by:Delores Zhou RN 11/13/2022  13:16      Gram stain aer bottle: Gram positive cocci in chains resembling Strep      Positive results previously called 11/13/2022  14:47    Influenza A & B by Molecular [[id number]] Collected: 11/12/22 2027    Order Status: Completed Specimen: Nasopharyngeal Swab Updated: 11/12/22 2102     Influenza A, Molecular Negative     Influenza B, Molecular Negative     Flu A & B Source Nasal swab    Influenza A & B by Molecular [[id number]] Collected: 11/12/22 1933    Order Status: Canceled Specimen: Nasopharyngeal Swab

## 2022-11-14 NOTE — PLAN OF CARE
Patient remained free from injury. Blood glucose monitored. IV abx maintained. No s/s of acute distress. 12hr chart check complete.

## 2022-11-15 PROBLEM — R78.81 BACTEREMIA DUE TO GROUP B STREPTOCOCCUS: Status: ACTIVE | Noted: 2022-11-15

## 2022-11-15 PROBLEM — B95.1 BACTEREMIA DUE TO GROUP B STREPTOCOCCUS: Status: ACTIVE | Noted: 2022-11-15

## 2022-11-15 LAB
ANION GAP SERPL CALC-SCNC: 11 MMOL/L (ref 8–16)
BASOPHILS # BLD AUTO: 0.05 K/UL (ref 0–0.2)
BASOPHILS NFR BLD: 0.5 % (ref 0–1.9)
BUN SERPL-MCNC: 23 MG/DL (ref 6–20)
CALCIUM SERPL-MCNC: 8.7 MG/DL (ref 8.7–10.5)
CHLORIDE SERPL-SCNC: 92 MMOL/L (ref 95–110)
CO2 SERPL-SCNC: 25 MMOL/L (ref 23–29)
CREAT SERPL-MCNC: 1.3 MG/DL (ref 0.5–1.4)
DIFFERENTIAL METHOD: ABNORMAL
EOSINOPHIL # BLD AUTO: 0.3 K/UL (ref 0–0.5)
EOSINOPHIL NFR BLD: 3 % (ref 0–8)
ERYTHROCYTE [DISTWIDTH] IN BLOOD BY AUTOMATED COUNT: 11.1 % (ref 11.5–14.5)
EST. GFR  (NO RACE VARIABLE): >60 ML/MIN/1.73 M^2
GLUCOSE SERPL-MCNC: 264 MG/DL (ref 70–110)
HCT VFR BLD AUTO: 26.3 % (ref 40–54)
HGB BLD-MCNC: 8.8 G/DL (ref 14–18)
IMM GRANULOCYTES # BLD AUTO: 0.14 K/UL (ref 0–0.04)
IMM GRANULOCYTES NFR BLD AUTO: 1.4 % (ref 0–0.5)
LYMPHOCYTES # BLD AUTO: 2.1 K/UL (ref 1–4.8)
LYMPHOCYTES NFR BLD: 20.9 % (ref 18–48)
MCH RBC QN AUTO: 31.3 PG (ref 27–31)
MCHC RBC AUTO-ENTMCNC: 33.5 G/DL (ref 32–36)
MCV RBC AUTO: 94 FL (ref 82–98)
MONOCYTES # BLD AUTO: 1.2 K/UL (ref 0.3–1)
MONOCYTES NFR BLD: 11.8 % (ref 4–15)
NEUTROPHILS # BLD AUTO: 6.1 K/UL (ref 1.8–7.7)
NEUTROPHILS NFR BLD: 62.4 % (ref 38–73)
NRBC BLD-RTO: 0 /100 WBC
PLATELET # BLD AUTO: 402 K/UL (ref 150–450)
PMV BLD AUTO: 9.4 FL (ref 9.2–12.9)
POCT GLUCOSE: 230 MG/DL (ref 70–110)
POCT GLUCOSE: 276 MG/DL (ref 70–110)
POCT GLUCOSE: 290 MG/DL (ref 70–110)
POCT GLUCOSE: 348 MG/DL (ref 70–110)
POTASSIUM SERPL-SCNC: 4.5 MMOL/L (ref 3.5–5.1)
RBC # BLD AUTO: 2.81 M/UL (ref 4.6–6.2)
SODIUM SERPL-SCNC: 128 MMOL/L (ref 136–145)
VANCOMYCIN SERPL-MCNC: 13.9 UG/ML
WBC # BLD AUTO: 9.85 K/UL (ref 3.9–12.7)

## 2022-11-15 PROCEDURE — 25000003 PHARM REV CODE 250: Performed by: PODIATRIST

## 2022-11-15 PROCEDURE — 36415 COLL VENOUS BLD VENIPUNCTURE: CPT | Performed by: STUDENT IN AN ORGANIZED HEALTH CARE EDUCATION/TRAINING PROGRAM

## 2022-11-15 PROCEDURE — C1769 GUIDE WIRE: HCPCS | Performed by: SURGERY

## 2022-11-15 PROCEDURE — 85025 COMPLETE CBC W/AUTO DIFF WBC: CPT | Performed by: STUDENT IN AN ORGANIZED HEALTH CARE EDUCATION/TRAINING PROGRAM

## 2022-11-15 PROCEDURE — 63600175 PHARM REV CODE 636 W HCPCS: Performed by: SURGERY

## 2022-11-15 PROCEDURE — C1730 CATH, EP, 19 OR FEW ELECT: HCPCS | Performed by: SURGERY

## 2022-11-15 PROCEDURE — 99233 PR SUBSEQUENT HOSPITAL CARE,LEVL III: ICD-10-PCS | Mod: ,,, | Performed by: STUDENT IN AN ORGANIZED HEALTH CARE EDUCATION/TRAINING PROGRAM

## 2022-11-15 PROCEDURE — 63600175 PHARM REV CODE 636 W HCPCS: Performed by: STUDENT IN AN ORGANIZED HEALTH CARE EDUCATION/TRAINING PROGRAM

## 2022-11-15 PROCEDURE — 25000003 PHARM REV CODE 250: Performed by: SURGERY

## 2022-11-15 PROCEDURE — 99233 SBSQ HOSP IP/OBS HIGH 50: CPT | Mod: ,,, | Performed by: STUDENT IN AN ORGANIZED HEALTH CARE EDUCATION/TRAINING PROGRAM

## 2022-11-15 PROCEDURE — 37229 HC TIB/PER REVASC W/ATHER: CPT | Mod: LT | Performed by: SURGERY

## 2022-11-15 PROCEDURE — 21400001 HC TELEMETRY ROOM

## 2022-11-15 PROCEDURE — 99233 PR SUBSEQUENT HOSPITAL CARE,LEVL III: ICD-10-PCS | Mod: NSCH,,, | Performed by: INTERNAL MEDICINE

## 2022-11-15 PROCEDURE — 25000003 PHARM REV CODE 250: Performed by: INTERNAL MEDICINE

## 2022-11-15 PROCEDURE — C1725 CATH, TRANSLUMIN NON-LASER: HCPCS | Performed by: SURGERY

## 2022-11-15 PROCEDURE — 63600175 PHARM REV CODE 636 W HCPCS: Performed by: PODIATRIST

## 2022-11-15 PROCEDURE — 99153 MOD SED SAME PHYS/QHP EA: CPT | Performed by: SURGERY

## 2022-11-15 PROCEDURE — C1894 INTRO/SHEATH, NON-LASER: HCPCS | Performed by: SURGERY

## 2022-11-15 PROCEDURE — 75710 ARTERY X-RAYS ARM/LEG: CPT | Mod: LT | Performed by: SURGERY

## 2022-11-15 PROCEDURE — C1887 CATHETER, GUIDING: HCPCS | Performed by: SURGERY

## 2022-11-15 PROCEDURE — 25000003 PHARM REV CODE 250: Performed by: STUDENT IN AN ORGANIZED HEALTH CARE EDUCATION/TRAINING PROGRAM

## 2022-11-15 PROCEDURE — 80048 BASIC METABOLIC PNL TOTAL CA: CPT | Performed by: PODIATRIST

## 2022-11-15 PROCEDURE — 99152 MOD SED SAME PHYS/QHP 5/>YRS: CPT | Performed by: SURGERY

## 2022-11-15 PROCEDURE — 99233 SBSQ HOSP IP/OBS HIGH 50: CPT | Mod: NSCH,,, | Performed by: INTERNAL MEDICINE

## 2022-11-15 PROCEDURE — 25500020 PHARM REV CODE 255: Performed by: SURGERY

## 2022-11-15 PROCEDURE — 37233 HC TIBPER REVASC W/ATHER ADD-ON: CPT | Mod: LT | Performed by: SURGERY

## 2022-11-15 PROCEDURE — C1760 CLOSURE DEV, VASC: HCPCS | Performed by: SURGERY

## 2022-11-15 PROCEDURE — 80202 ASSAY OF VANCOMYCIN: CPT | Performed by: STUDENT IN AN ORGANIZED HEALTH CARE EDUCATION/TRAINING PROGRAM

## 2022-11-15 RX ORDER — INSULIN ASPART 100 [IU]/ML
1-10 INJECTION, SOLUTION INTRAVENOUS; SUBCUTANEOUS
Status: DISCONTINUED | OUTPATIENT
Start: 2022-11-15 | End: 2022-11-29 | Stop reason: HOSPADM

## 2022-11-15 RX ORDER — ACETAMINOPHEN 325 MG/1
650 TABLET ORAL EVERY 4 HOURS PRN
Status: DISCONTINUED | OUTPATIENT
Start: 2022-11-15 | End: 2022-11-29 | Stop reason: HOSPADM

## 2022-11-15 RX ORDER — LIDOCAINE HYDROCHLORIDE 20 MG/ML
INJECTION, SOLUTION EPIDURAL; INFILTRATION; INTRACAUDAL; PERINEURAL
Status: DISCONTINUED | OUTPATIENT
Start: 2022-11-15 | End: 2022-11-15 | Stop reason: HOSPADM

## 2022-11-15 RX ORDER — DIPHENHYDRAMINE HYDROCHLORIDE 50 MG/ML
INJECTION INTRAMUSCULAR; INTRAVENOUS
Status: DISCONTINUED | OUTPATIENT
Start: 2022-11-15 | End: 2022-11-15 | Stop reason: HOSPADM

## 2022-11-15 RX ORDER — NITROGLYCERIN 5 MG/ML
INJECTION, SOLUTION INTRAVENOUS
Status: DISCONTINUED | OUTPATIENT
Start: 2022-11-15 | End: 2022-11-15 | Stop reason: HOSPADM

## 2022-11-15 RX ORDER — MIDAZOLAM HYDROCHLORIDE 1 MG/ML
INJECTION, SOLUTION INTRAMUSCULAR; INTRAVENOUS
Status: DISCONTINUED | OUTPATIENT
Start: 2022-11-15 | End: 2022-11-15 | Stop reason: HOSPADM

## 2022-11-15 RX ORDER — IODIXANOL 320 MG/ML
INJECTION, SOLUTION INTRAVASCULAR
Status: DISCONTINUED | OUTPATIENT
Start: 2022-11-15 | End: 2022-11-15 | Stop reason: HOSPADM

## 2022-11-15 RX ORDER — FENTANYL CITRATE 50 UG/ML
INJECTION, SOLUTION INTRAMUSCULAR; INTRAVENOUS
Status: DISCONTINUED | OUTPATIENT
Start: 2022-11-15 | End: 2022-11-15 | Stop reason: HOSPADM

## 2022-11-15 RX ORDER — HEPARIN SODIUM 1000 [USP'U]/ML
INJECTION, SOLUTION INTRAVENOUS; SUBCUTANEOUS
Status: DISCONTINUED | OUTPATIENT
Start: 2022-11-15 | End: 2022-11-15 | Stop reason: HOSPADM

## 2022-11-15 RX ORDER — PROTAMINE SULFATE 10 MG/ML
INJECTION, SOLUTION INTRAVENOUS
Status: DISCONTINUED | OUTPATIENT
Start: 2022-11-15 | End: 2022-11-15 | Stop reason: HOSPADM

## 2022-11-15 RX ORDER — ONDANSETRON 2 MG/ML
4 INJECTION INTRAMUSCULAR; INTRAVENOUS EVERY 12 HOURS PRN
Status: DISCONTINUED | OUTPATIENT
Start: 2022-11-15 | End: 2022-11-22

## 2022-11-15 RX ORDER — INSULIN ASPART 100 [IU]/ML
4-20 INJECTION, SOLUTION INTRAVENOUS; SUBCUTANEOUS
Status: DISCONTINUED | OUTPATIENT
Start: 2022-11-15 | End: 2022-11-15

## 2022-11-15 RX ADMIN — CEFEPIME HYDROCHLORIDE 2 G: 2 INJECTION, SOLUTION INTRAVENOUS at 09:11

## 2022-11-15 RX ADMIN — CHOLESTYRAMINE 4 G: 4 POWDER, FOR SUSPENSION ORAL at 09:11

## 2022-11-15 RX ADMIN — AMLODIPINE BESYLATE 5 MG: 5 TABLET ORAL at 09:11

## 2022-11-15 RX ADMIN — INSULIN ASPART 8 UNITS: 100 INJECTION, SOLUTION INTRAVENOUS; SUBCUTANEOUS at 06:11

## 2022-11-15 RX ADMIN — Medication 1 TABLET: at 09:11

## 2022-11-15 RX ADMIN — INSULIN ASPART 2 UNITS: 100 INJECTION, SOLUTION INTRAVENOUS; SUBCUTANEOUS at 09:11

## 2022-11-15 RX ADMIN — VANCOMYCIN HYDROCHLORIDE 1500 MG: 1.5 INJECTION, POWDER, LYOPHILIZED, FOR SOLUTION INTRAVENOUS at 09:11

## 2022-11-15 RX ADMIN — Medication 1 TABLET: at 12:11

## 2022-11-15 RX ADMIN — INSULIN ASPART 6 UNITS: 100 INJECTION, SOLUTION INTRAVENOUS; SUBCUTANEOUS at 06:11

## 2022-11-15 RX ADMIN — VANCOMYCIN HYDROCHLORIDE 1500 MG: 1.5 INJECTION, POWDER, LYOPHILIZED, FOR SOLUTION INTRAVENOUS at 10:11

## 2022-11-15 RX ADMIN — ENOXAPARIN SODIUM 40 MG: 40 INJECTION SUBCUTANEOUS at 05:11

## 2022-11-15 RX ADMIN — CEFEPIME HYDROCHLORIDE 2 G: 2 INJECTION, SOLUTION INTRAVENOUS at 05:11

## 2022-11-15 RX ADMIN — Medication 1 TABLET: at 05:11

## 2022-11-15 RX ADMIN — CEFEPIME HYDROCHLORIDE 2 G: 2 INJECTION, SOLUTION INTRAVENOUS at 11:11

## 2022-11-15 RX ADMIN — INSULIN DETEMIR 18 UNITS: 100 INJECTION, SOLUTION SUBCUTANEOUS at 09:11

## 2022-11-15 NOTE — SUBJECTIVE & OBJECTIVE
Interval History: No acute events overnight. Doing well this AM with no complaints at our encounter. Denies CP, SOB, Abdominal pain, fevers/chills. Evaluated prior to angiogram.       Objective:     Vital Signs (Most Recent):  Temp: 98.4 °F (36.9 °C) (11/15/22 1204)  Pulse: 82 (11/15/22 1204)  Resp: 17 (11/15/22 1204)  BP: 138/68 (11/15/22 1204)  SpO2: 98 % (11/15/22 1204) Vital Signs (24h Range):  Temp:  [98.1 °F (36.7 °C)-98.5 °F (36.9 °C)] 98.4 °F (36.9 °C)  Pulse:  [79-89] 82  Resp:  [17-18] 17  SpO2:  [96 %-98 %] 98 %  BP: (128-148)/(61-68) 138/68     Weight: 112 kg (246 lb 14.6 oz)  Body mass index is 30.86 kg/m².    Intake/Output Summary (Last 24 hours) at 11/15/2022 1446  Last data filed at 11/15/2022 0936  Gross per 24 hour   Intake 593.22 ml   Output --   Net 593.22 ml      Physical Exam  GEN: No acute distress, pleasant, body habitus normal  HEENT: atraumatic and normocephalic  CARDS: regular rate and rhythm, no m/g, pulses are NOT palpable in bilateral LE  PULM: breathing comfortably on room air, chest symmetric, nonlabored, no abnormal breath sounds on auscultation  SKIN: left foot bandaged, dressing is c/d/I- R foot bandaged, but visualized during bandage change, betadine covers wound, still no active drainage on inspection    ABD: nontender, nondistended, soft, no organomegaly, BS+  Neuro: Alert and oriented x3, CN's I-IX grossly intact, sensation and motor intact; follows directions and answers questions appropriately      Significant Labs: All pertinent labs within the past 24 hours have been reviewed.  BMP:   Recent Labs   Lab 11/15/22  0534   *   *   K 4.5   CL 92*   CO2 25   BUN 23*   CREATININE 1.3   CALCIUM 8.7     CBC:   Recent Labs   Lab 11/14/22  0536 11/15/22  0637   WBC 16.14* 9.85   HGB 9.4* 8.8*   HCT 27.9* 26.3*    402     CMP:   Recent Labs   Lab 11/14/22  0536 11/15/22  0534   * 128*   K 3.7 4.5   CL 91* 92*   CO2 28 25   * 264*   BUN 26* 23*    CREATININE 1.4 1.3   CALCIUM 8.6* 8.7   ANIONGAP 11 11     Cardiac Markers: No results for input(s): CKMB, MYOGLOBIN, BNP, TROPISTAT in the last 48 hours.  Coagulation: No results for input(s): PT, INR, APTT in the last 48 hours.  Lactic Acid: No results for input(s): LACTATE in the last 48 hours.  Lipid Panel: No results for input(s): CHOL, HDL, LDLCALC, TRIG, CHOLHDL in the last 48 hours.  Magnesium: No results for input(s): MG in the last 48 hours.  Troponin: No results for input(s): TROPONINI, TROPONINIHS in the last 48 hours.  Urine Studies: No results for input(s): COLORU, APPEARANCEUA, PHUR, SPECGRAV, PROTEINUA, GLUCUA, KETONESU, BILIRUBINUA, OCCULTUA, NITRITE, UROBILINOGEN, LEUKOCYTESUR, RBCUA, WBCUA, BACTERIA, SQUAMEPITHEL, HYALINECASTS in the last 48 hours.    Invalid input(s): WRIGHTSUR    Significant Imaging: I have reviewed all pertinent imaging results/findings within the past 24 hours.    Imaging Results               X-Ray Foot Complete Left (Final result)  Result time 11/12/22 19:55:06      Final result by Renzo Christopher MD (11/12/22 19:55:06)                   Impression:      Soft tissue emphysema and irregular margin of the distal aspect of the 1st metatarsal strongly concerning for acute osteomyelitis.    This report was flagged in Epic as abnormal.      Electronically signed by: Renzo Christopher  Date:    11/12/2022  Time:    19:55               Narrative:    EXAMINATION:  XR FOOT COMPLETE 3 VIEW LEFT    CLINICAL HISTORY:  .  Local infection of the skin and subcutaneous tissue, unspecified    TECHNIQUE:  AP, lateral and oblique views of the left foot were performed.    COMPARISON:  None    FINDINGS:  Soft tissue emphysema over the 1st digit and dorsum of the foot strongly concerning for infection.  Irregular margin of the 1st metatarsal concerning for osteomyelitis.  Correlation with MRI with contrast would be advised if clinically feasible and the patient is compatible.

## 2022-11-15 NOTE — PROGRESS NOTES
"Pharmacokinetic Assessment Follow Up: IV Vancomycin    Vancomycin serum concentration assessment(s):    The random level was drawn correctly and can be used to guide therapy at this time. The measurement is below the desired definitive target range of 15 to 20 mcg/mL.    Vancomycin Regimen Plan:    Re-dose when the random level is less than 20 mcg/mL, next level to be drawn at 0430 on 11/15    Drug levels (last 3 results):  Recent Labs   Lab Result Units 11/13/22  0925 11/14/22  0011 11/14/22  1425   Vancomycin, Random ug/mL 14.7 15.3 9.8       Pharmacy will continue to follow and monitor vancomycin.    Please contact pharmacy at extension [phone] for questions regarding this assessment.    Thank you for the consult,   Lydia Magdaleno       Patient brief summary:  Joseph Paris Jr. is a 52 y.o. male initiated on antimicrobial therapy with IV Vancomycin for treatment of bone/joint infection      Drug Allergies:   Review of patient's allergies indicates:   Allergen Reactions    Lisinopril Swelling    Penicillins Other (See Comments)     Pt unsure of reaction, stating "I just know I'm allergic"       Actual Body Weight:   112 kg    Renal Function:   Estimated Creatinine Clearance: 81.5 mL/min (based on SCr of 1.4 mg/dL).,     Dialysis Method (if applicable):  N/A    CBC (last 72 hours):  Recent Labs   Lab Result Units 11/12/22 1937 11/13/22 0615 11/14/22  0536   WBC K/uL 22.99* 22.30* 16.14*   Hemoglobin g/dL 11.6* 11.0* 9.4*   Hematocrit % 34.2* 32.6* 27.9*   Platelets K/uL 378 367 406   Gran % % 86.0* 84.0* 76.7*   Lymph % % 6.7* 6.9* 11.9*   Mono % % 6.0 7.7 9.0   Eosinophil % % 0.1 0.4 1.2   Basophil % % 0.3 0.3 0.3   Differential Method  Automated Automated Automated       Metabolic Panel (last 72 hours):  Recent Labs   Lab Result Units 11/12/22 1937 11/13/22 0615 11/14/22  0536   Sodium mmol/L 131* 132* 130*   Potassium mmol/L 4.2 4.2 3.7   Chloride mmol/L 91* 89* 91*   CO2 mmol/L 23 24 28   Glucose mg/dL 267* " 245* 263*   BUN mg/dL 27* 28* 26*   Creatinine mg/dL 2.0* 1.7* 1.4   Albumin g/dL 2.5*  --   --    Total Bilirubin mg/dL 1.9*  --   --    Alkaline Phosphatase U/L 260*  --   --    AST U/L 43*  --   --    ALT U/L 12  --   --        Vancomycin Administrations:  vancomycin given in the last 96 hours                     vancomycin 1.5 g in dextrose 5 % 250 mL IVPB (ready to mix) ()  Restarted 11/14/22 1824     1,500 mg New Bag  1729    vancomycin 1.25 g in dextrose 5% 250 mL IVPB (ready to mix) (mg) 1,250 mg New Bag 11/14/22 0241    vancomycin 1.5 g in dextrose 5 % 250 mL IVPB (ready to mix) (mg) 1,500 mg New Bag 11/13/22 1223    vancomycin injection (g) 1 g Given 11/13/22 1117    vancomycin 2 g in dextrose 5 % 500 mL IVPB (mg) 2,000 mg New Bag 11/12/22 2121                    Microbiologic Results:  Microbiology Results (last 7 days)       Procedure Component Value Units Date/Time    Blood culture [[id number]] Collected: 11/14/22 0536    Order Status: Sent Specimen: Blood Updated: 11/14/22 1555    Blood culture [[id number]] Collected: 11/14/22 0536    Order Status: Sent Specimen: Blood Updated: 11/14/22 1555    AFB Culture & Smear [[id number]] Collected: 11/13/22 1148    Order Status: Completed Specimen: Abscess from Foot, Left Updated: 11/14/22 1555     AFB CULTURE STAIN No acid fast bacilli seen.    Narrative:      Left Foot Abscess    Blood Culture #2 **CANNOT BE ORDERED STAT** [[id number]]  (Abnormal) Collected: 11/12/22 2021    Order Status: Completed Specimen: Blood from Peripheral, Forearm, Left Updated: 11/14/22 0849     Blood Culture, Routine Gram stain silva bottle: Gram positive cocci in chains resembling Strep      Results called to and read back by:Delores Zhou RN 11/13/2022  13:16      Gram stain aer bottle: Gram positive cocci in chains resembling Strep      Positive results previously called 11/13/2022  14:48      STREPTOCOCCUS AGALACTIAE (GROUP B)  Beta-hemolytic streptococci are routinely susceptible to    penicillins,cephalosporins and carbapenems.  For susceptibility see order #[id number]      Blood Culture #1 **CANNOT BE ORDERED STAT** [[id number]]  (Abnormal) Collected: 11/12/22 1941    Order Status: Completed Specimen: Blood from Peripheral, Antecubital, Left Updated: 11/14/22 0845     Blood Culture, Routine Gram stain silva bottle: Gram positive cocci in chains resembling Strep      Results called to and read back by:Delores Zhou RN 11/13/2022  13:16      Gram stain aer bottle: Gram positive cocci in chains resembling Strep      Positive results previously called 11/13/2022  14:47      STREPTOCOCCUS AGALACTIAE (GROUP B)  Susceptibility pending  Beta-hemolytic streptococci are routinely susceptible to   penicillins,cephalosporins and carbapenems.      Gram stain [[id number]] Collected: 11/13/22 1148    Order Status: Completed Specimen: Abscess from Foot, Left Updated: 11/13/22 2106     Gram Stain Result Rare WBC's      Rare Gram positive cocci      Rare Gram negative rods    Narrative:      Left Foot Abscess    Aerobic culture [[id number]] Collected: 11/13/22 1148    Order Status: Sent Specimen: Abscess from Foot, Left Updated: 11/13/22 1956    Fungus culture [[id number]] Collected: 11/13/22 1148    Order Status: Sent Specimen: Abscess from Foot, Left Updated: 11/13/22 1951    Culture, Anaerobe [[id number]] Collected: 11/13/22 1148    Order Status: Sent Specimen: Abscess from Foot, Left Updated: 11/13/22 1951    Influenza A & B by Molecular [[id number]] Collected: 11/12/22 2027    Order Status: Completed Specimen: Nasopharyngeal Swab Updated: 11/12/22 2102     Influenza A, Molecular Negative     Influenza B, Molecular Negative     Flu A & B Source Nasal swab    Influenza A & B by Molecular [[id number]] Collected: 11/12/22 1933    Order Status: Canceled Specimen: Nasopharyngeal Swab         Lydia Rasta [facility] 11/14/2022 8:14 PM

## 2022-11-15 NOTE — ASSESSMENT & PLAN NOTE
-S/p I&D on 11/13  -BC (11/13) grew group B strep- repeat cultures (11/14) no growth to date  -MRI of L FOOT: Large ulceration extending along 1st metatarsal shaft to 1st proximal phalanx. Abnormal bone marrow signal enhancement compatible with osteomyelitis.   -Podiatry rec angiogram of left leg- vascular surgery consulted  -Angiogram of bilateral LE's on 11/15- pending  -ID consulted for abx guidance- continue IV cefepime/Vanc       left arm pain

## 2022-11-15 NOTE — ASSESSMENT & PLAN NOTE
--asked nursing to notify wound care of lesion  --follow up recs  --soft bandaging with sock covering until wound care evaluates  --Vascular surgery ordering bilateral angiogram which I think is appropriate

## 2022-11-15 NOTE — PLAN OF CARE
Pt awakened after procedure and remains AAOx3 at time of transfer.   R femoral dressing remains CDI at time of transfer.   Transferred pt to room 572 via hospital bed in no apparent distress.   Report given to JOSE MARIA Johnson . No further questions at this time.

## 2022-11-15 NOTE — DISCHARGE INSTRUCTIONS
"Angiogram Lower Extremity Discharge Instructions    1. DIET: It is advisable for you to follow a diet that limits the intake of salt, sugar, saturated fats and cholesterol.     2. FOR THE NEXT 24 HOURS:   For the next 8 hours, you should be watched by a responsible adult. This person should make sure your condition is not getting worse.   Don't drink any alcohol for the next 24 hours.  Don't drive, operate dangerous machinery, or make important business or personal decisions during the next 24 hours.  Your healthcare provider may tell you not to take any medicine by mouth for pain or sleep in the next 4 hours. These medicines may react with the medicines you were given in the hospital. This could cause a much stronger response than usual.       3. ACTIVITY:                                Day of discharge:             NO vigorous activity, lifting or straining                                                   Day after discharge:         Avoid heavy lifting (up to 10 lbs)                                                                        The day after discharge you may shower, but avoid tub baths for 5 days                                                                         Wash site gently with soap and water        NO powder or lotion to your procedure site.                 Before you shower, remove dressing, apply bandaid if desired                                                                                                                                                                            2nd day after discharge:  Resume normal activities                                                                         Exercise program as instructed      4. WOUND CARE: It is not unusual to have a small amount of bruising appear in the groin area. It is also common to have a tender "knot" develop beneath the skin at the puncture site in the groin. This is scar tissue only and is not a cause for concern or " "alarm. This tender knot may take several weeks to fully resolve. The bruise will usually spread over several days. If the lump gets bigger, call you doctor immediately.    5. DISCOMFORT: For general discomfort at the puncture site, you may take 1 or 2 Acetaminophen (Tylenol) tablets every 4 hours as needed. (Do not take more than 4000 mg a day)                 6. CALL YOUR HEALTHCARE PROVIDER IF YOU START TO HAVE THE FOLLOWING SYMPTOMS:        1. Problems with the affected leg: Pain, discomfort, loss of warmth, numbness or tingling                                                                                                                            2. Problems at the groin site: Bleeding, pain that is sudden/sharp/persistent,                   swelling at site or a change in "lump" size, increased redness or drainage at                     puncture site                                                               3. High fever (101 degrees or higher)       4.  Drowsiness that doesn't get better       5. Weakness or dizziness that doesn't get better            6. Repeated vomiting        7. GO TO  THE EMERGENCY ROOM OR CALL 911 IF YOU HAVE: Chest pains or discomforts not relieved with 3 nitroglycerin doses (sublingual tablets or spray), numbness or severe pain or if your foot or leg becomes cold or discolored or uncontrolled bleeding from site (apply direct pressure above site).   "

## 2022-11-15 NOTE — OP NOTE
[facility] - Cath Lab ([facility])  Vascular Surgery  Operative Note    SUMMARY     Date of Procedure: 11/15/2022     Procedure: Procedure(s) (LRB):  ANGIOGRAM, LOWER EXTREMITY/left leg poss pta/stent (N/A)  PTA (ANGIOPLASTY, PERCUTANEOUS, TRANSLUMINAL) (N/A)  Atherectomy   Left anterior tibial artery diamondback atherectomy with post atherectomy angioplasty using 3 x 200 mm balloon    Surgeon(s) and Role:     * Cameron Berman MD - Primary    Assisting Surgeon: None    Pre-Operative Diagnosis: PVD (peripheral vascular disease) [I73.9]    Post-Operative Diagnosis: Post-Op Diagnosis Codes:     * PVD (peripheral vascular disease) [I73.9]    Anesthesia: RN IV Sedation    Operative Findings (including complications, if any):  After consent was obtained risks and benefits explained the patient brought to the angio suite and put in the supine position.  Once anesthesia was administered bilateral groins were prepped and draped in a sterile fashion.  With ultrasound guidance we successfully cannulate the right common femoral artery and performed aortogram and left lower extremity runoff.  We then gave systemic heparinization and then performed the above-mentioned atherectomy and angioplasty with completion angiogram.  The right groin was then closed with a Mynx device.    Description of Technical Procedures:  Ultrasound-guided right common femoral artery aortogram with left lower extremity runoff:  Successful cannulation of right common femoral artery with widely patent aorta as well as bilateral iliac segments were widely patent left femoral-popliteal segment as well as widely patent peroneal and posterior tibial artery into the foot with 2 pre occlusive    Lesions of the anterior tibial artery left anterior tibial artery atherectomy and angioplasty above: Successful atherectomy and angioplasty with widely patent anterior tibial artery at the end of the case.    Significant Surgical Tasks Conducted by the Assistant(s), if  Applicable:  None    Estimated Blood Loss (EBL): * No values recorded between 11/15/2022  2:24 PM and 11/15/2022  3:09 PM *           Implants: * No implants in log *    Specimens:   Specimen (24h ago, onward)      None                    Condition:  Good    Disposition:  Stable    Attestation: I performed the procedure.    He now has three-vessel runoff into the left foot.  He should heal any type of surgical intervention are wound healing from a vascular standpoint.  Please have him follow up with us in clinic in 2 weeks.

## 2022-11-15 NOTE — SUBJECTIVE & OBJECTIVE
"Past Medical History:   Diagnosis Date    Coronary artery disease     Diabetes mellitus     Esophageal cancer     Hypertension        Past Surgical History:   Procedure Laterality Date    CORONARY ARTERY BYPASS GRAFT      FEMORAL BYPASS      INCISION AND DRAINAGE FOOT Left 11/13/2022    Procedure: INCISION AND DRAINAGE, FOOT;  Surgeon: Sierra Augustine DPM;  Location: [facility];  Service: Podiatry;  Laterality: Left;       Review of patient's allergies indicates:   Allergen Reactions    Lisinopril Swelling    Penicillins Other (See Comments)     Pt unsure of reaction, stating "I just know I'm allergic"       Medications:  Medications Prior to Admission   Medication Sig    blood sugar diagnostic Strp 2 (two) times daily.    calcium citrate-vitamin D3 315-250 mg-unit Tab Take 1 tablet by mouth once daily.    dulaglutide 0.75 mg/0.5 mL PnIj Inject 0.75 mg into the skin every 7 days.    empagliflozin 25 mg Tab Take 25 mg by mouth once daily.    gabapentin (NEURONTIN) 300 MG capsule Take by mouth every evening.    glipiZIDE (GLUCOTROL) 5 MG TR24 Take 5 mg by mouth once daily.    losartan-hydrochlorothiazide 50-12.5 mg (HYZAAR) 50-12.5 mg per tablet Take 1 tablet by mouth once daily.    metformin (GLUCOPHAGE-XR) 750 MG 24 hr tablet Take 1,500 mg by mouth daily with breakfast.    omeprazole (PRILOSEC) 20 MG capsule Take 1 capsule by mouth once daily.    pravastatin (PRAVACHOL) 20 MG tablet Take 20 mg by mouth once daily.     Antibiotics (From admission, onward)      Start     Stop Route Frequency Ordered    11/13/22 1550  cefepime in dextrose 5 % IVPB 2 g         -- IV Every 8 hours (non-standard times) 11/13/22 0919 11/12/22 2053  vancomycin - pharmacy to dose  (vancomycin IVPB)        See Elida for full Linked Orders Report.    -- IV pharmacy to manage frequency 11/12/22 1954          Antifungals (From admission, onward)      None          Antivirals (From admission, onward)      None             Immunization " History   Administered Date(s) Administered    COVID-19, MRNA, LN-S, PF (Pfizer) (Purple Cap) 12/27/2021       Family History       Problem Relation (Age of Onset)    Cancer Father    Diabetes Sister, Brother    Heart disease Mother    Stroke Mother          Social History     Socioeconomic History    Marital status: Single   Tobacco Use    Smoking status: Former    Smokeless tobacco: Never   Substance and Sexual Activity    Alcohol use: Yes    Drug use: No     Review of Systems   Constitutional:  Positive for activity change, appetite change and fatigue. Negative for chills and fever.   Respiratory:  Positive for shortness of breath. Negative for chest tightness and wheezing.    Cardiovascular:  Negative for chest pain and leg swelling.   Gastrointestinal:  Positive for diarrhea. Negative for abdominal pain, nausea and vomiting.   Genitourinary:  Negative for flank pain.   Musculoskeletal:  Negative for back pain, joint swelling and myalgias.   Skin:  Positive for color change and wound.   Neurological:  Negative for syncope, weakness and light-headedness.   Psychiatric/Behavioral:  Negative for confusion.    Objective:     Vital Signs (Most Recent):  Temp: 98.5 °F (36.9 °C) (11/14/22 1944)  Pulse: 80 (11/14/22 1944)  Resp: 18 (11/14/22 1944)  BP: 128/61 (11/14/22 1944)  SpO2: 98 % (11/14/22 1944) Vital Signs (24h Range):  Temp:  [98 °F (36.7 °C)-99.3 °F (37.4 °C)] 98.5 °F (36.9 °C)  Pulse:  [77-82] 80  Resp:  [17-18] 18  SpO2:  [95 %-99 %] 98 %  BP: (126-156)/(60-75) 128/61     Weight: 112 kg (246 lb 14.6 oz)  Body mass index is 30.86 kg/m².    Estimated Creatinine Clearance: 81.5 mL/min (based on SCr of 1.4 mg/dL).    Physical Exam  Vitals and nursing note reviewed.   HENT:      Head: Normocephalic and atraumatic.   Eyes:      Pupils: Pupils are equal, round, and reactive to light.   Neck:      Thyroid: No thyromegaly.   Cardiovascular:      Rate and Rhythm: Normal rate and regular rhythm.   Pulmonary:       Effort: Pulmonary effort is normal. No respiratory distress.      Breath sounds: No wheezing.   Abdominal:      General: Bowel sounds are normal.      Palpations: Abdomen is soft.      Tenderness: There is no abdominal tenderness.   Musculoskeletal:      Cervical back: Normal range of motion and neck supple.   Skin:     Comments: Dressing noted over lower extremity        Significant Labs: Blood Culture:   Recent Labs   Lab 11/12/22 1941 11/12/22 2021   LABBLOO Gram stain silva bottle: Gram positive cocci in chains resembling Strep  Results called to and read back by:Delores Zhou RN 11/13/2022  13:16  Gram stain aer bottle: Gram positive cocci in chains resembling Strep  Positive results previously called 11/13/2022  14:47  STREPTOCOCCUS AGALACTIAE (GROUP B)  Susceptibility pending  Beta-hemolytic streptococci are routinely susceptible to   penicillins,cephalosporins and carbapenems.  * Gram stain silva bottle: Gram positive cocci in chains resembling Strep  Results called to and read back by:Delores Zhou RN 11/13/2022  13:16  Gram stain aer bottle: Gram positive cocci in chains resembling Strep  Positive results previously called 11/13/2022  14:48  STREPTOCOCCUS AGALACTIAE (GROUP B)  Beta-hemolytic streptococci are routinely susceptible to   penicillins,cephalosporins and carbapenems.  For susceptibility see order #[id number]  *     BMP:   Recent Labs   Lab 11/14/22  0536   *   *   K 3.7   CL 91*   CO2 28   BUN 26*   CREATININE 1.4   CALCIUM 8.6*     CBC:   Recent Labs   Lab 11/13/22  0615 11/14/22  0536   WBC 22.30* 16.14*   HGB 11.0* 9.4*   HCT 32.6* 27.9*    406     CMP:   Recent Labs   Lab 11/13/22 0615 11/14/22  0536   * 130*   K 4.2 3.7   CL 89* 91*   CO2 24 28   * 263*   BUN 28* 26*   CREATININE 1.7* 1.4   CALCIUM 8.6* 8.6*   ANIONGAP 19* 11     Wound Culture: No results for input(s): LABAERO in the last 4320 hours.  All pertinent labs within the past 24 hours have been  reviewed.    Significant Imaging: I have reviewed all pertinent imaging results/findings within the past 24 hours.

## 2022-11-15 NOTE — CONSULTS
"[facility] - Med Surg  Infectious Disease  Consult Note    Patient Name: Joseph Paris Jr.  MRN: [id number]  Admission Date: 11/12/2022  Hospital Length of Stay: 1 days  Attending Physician: Jas Killian MD  Primary Care Provider: Thong Hidalgo Jr, MD     Isolation Status: No active isolations    Patient information was obtained from patient, past medical records and ER records.      Consults  Assessment/Plan:     * Acute osteomyelitis of metatarsal bone of left foot  Will follow cultures to guide regime  Will use Vanco,Cefepime and Flagyl   Will need to treat for 6 weeks    Gas gangrene of foot  S/p I and D , podiatry follow up   Continue Vanco. Cefepime, Flagyl    Diabetes mellitus  Diabetic diet, insulin sliding scale     TIERA (acute kidney injury)  Will avoid nephrotoxic meds , follow serum creatinine         Thank you for your consult. I will follow-up with patient. Please contact us if you have any additional questions.    Compa Leal MD, [facility]  Infectious Disease  [facility] - Med Surg    Subjective:     Principal Problem: Acute osteomyelitis of metatarsal bone of left foot    HPI:    52 y.o. male patient with a PMHx of HTN, DM, and CAD admitted with worsening lower extremity infection.  Labs and imaging test reviewed-  Blood culture- strep agalactiae 11/12/22  Arterial ultrasound-   Suggestion of mild stenosis within the left iliac artery. Suggestion of hemodynamically significant stenoses within the distal left SFA, distal popliteal artery, and tibioperoneal trunk.   Right lower extremity arterial system unremarkable.  MRI of the left foot-  Findings compatible with osteomyelitis of the 1st metatarsal and 1st proximal phalanx.    .  He was seen by podiatry and had I and d done-  Operative findings -"   Gas gangrene/ Necrotizing fasciitis to the left foot. Large abscess at the first MPJ dorsal foot progressing proximally to the anterior ankle and proximal-laterally to the dorsal proximal midfoot. Very " "little bleeding without tourniquet or exsanguination. "  Component      Latest Ref Rng & Units 11/14/2022 11/13/2022 11/12/2022   WBC      3.90 - 12.70 K/uL 16.14 (H) 22.30 (H) 22.99 (H)       Past Medical History:   Diagnosis Date    Coronary artery disease     Diabetes mellitus     Esophageal cancer     Hypertension        Past Surgical History:   Procedure Laterality Date    CORONARY ARTERY BYPASS GRAFT      FEMORAL BYPASS      INCISION AND DRAINAGE FOOT Left 11/13/2022    Procedure: INCISION AND DRAINAGE, FOOT;  Surgeon: Sierra Augustine DPM;  Location: [facility];  Service: Podiatry;  Laterality: Left;       Review of patient's allergies indicates:   Allergen Reactions    Lisinopril Swelling    Penicillins Other (See Comments)     Pt unsure of reaction, stating "I just know I'm allergic"       Medications:  Medications Prior to Admission   Medication Sig    blood sugar diagnostic Strp 2 (two) times daily.    calcium citrate-vitamin D3 315-250 mg-unit Tab Take 1 tablet by mouth once daily.    dulaglutide 0.75 mg/0.5 mL PnIj Inject 0.75 mg into the skin every 7 days.    empagliflozin 25 mg Tab Take 25 mg by mouth once daily.    gabapentin (NEURONTIN) 300 MG capsule Take by mouth every evening.    glipiZIDE (GLUCOTROL) 5 MG TR24 Take 5 mg by mouth once daily.    losartan-hydrochlorothiazide 50-12.5 mg (HYZAAR) 50-12.5 mg per tablet Take 1 tablet by mouth once daily.    metformin (GLUCOPHAGE-XR) 750 MG 24 hr tablet Take 1,500 mg by mouth daily with breakfast.    omeprazole (PRILOSEC) 20 MG capsule Take 1 capsule by mouth once daily.    pravastatin (PRAVACHOL) 20 MG tablet Take 20 mg by mouth once daily.     Antibiotics (From admission, onward)      Start     Stop Route Frequency Ordered    11/13/22 1550  cefepime in dextrose 5 % IVPB 2 g         -- IV Every 8 hours (non-standard times) 11/13/22 0919 11/12/22 2053  vancomycin - pharmacy to dose  (vancomycin IVPB)        See Hyperspace for full " Linked Orders Report.    -- IV pharmacy to manage frequency 11/12/22 1954          Antifungals (From admission, onward)      None          Antivirals (From admission, onward)      None             Immunization History   Administered Date(s) Administered    COVID-19, MRNA, LN-S, PF (Pfizer) (Purple Cap) 12/27/2021       Family History       Problem Relation (Age of Onset)    Cancer Father    Diabetes Sister, Brother    Heart disease Mother    Stroke Mother          Social History     Socioeconomic History    Marital status: Single   Tobacco Use    Smoking status: Former    Smokeless tobacco: Never   Substance and Sexual Activity    Alcohol use: Yes    Drug use: No     Review of Systems   Constitutional:  Positive for activity change, appetite change and fatigue. Negative for chills and fever.   Respiratory:  Positive for shortness of breath. Negative for chest tightness and wheezing.    Cardiovascular:  Negative for chest pain and leg swelling.   Gastrointestinal:  Positive for diarrhea. Negative for abdominal pain, nausea and vomiting.   Genitourinary:  Negative for flank pain.   Musculoskeletal:  Negative for back pain, joint swelling and myalgias.   Skin:  Positive for color change and wound.   Neurological:  Negative for syncope, weakness and light-headedness.   Psychiatric/Behavioral:  Negative for confusion.    Objective:     Vital Signs (Most Recent):  Temp: 98.5 °F (36.9 °C) (11/14/22 1944)  Pulse: 80 (11/14/22 1944)  Resp: 18 (11/14/22 1944)  BP: 128/61 (11/14/22 1944)  SpO2: 98 % (11/14/22 1944) Vital Signs (24h Range):  Temp:  [98 °F (36.7 °C)-99.3 °F (37.4 °C)] 98.5 °F (36.9 °C)  Pulse:  [77-82] 80  Resp:  [17-18] 18  SpO2:  [95 %-99 %] 98 %  BP: (126-156)/(60-75) 128/61     Weight: 112 kg (246 lb 14.6 oz)  Body mass index is 30.86 kg/m².    Estimated Creatinine Clearance: 81.5 mL/min (based on SCr of 1.4 mg/dL).    Physical Exam  Vitals and nursing note reviewed.   HENT:      Head: Normocephalic  and atraumatic.   Eyes:      Pupils: Pupils are equal, round, and reactive to light.   Neck:      Thyroid: No thyromegaly.   Cardiovascular:      Rate and Rhythm: Normal rate and regular rhythm.   Pulmonary:      Effort: Pulmonary effort is normal. No respiratory distress.      Breath sounds: No wheezing.   Abdominal:      General: Bowel sounds are normal.      Palpations: Abdomen is soft.      Tenderness: There is no abdominal tenderness.   Musculoskeletal:      Cervical back: Normal range of motion and neck supple.   Skin:     Comments: Dressing noted over lower extremity        Significant Labs: Blood Culture:   Recent Labs   Lab 11/12/22 1941 11/12/22 2021   LABBLOO Gram stain silva bottle: Gram positive cocci in chains resembling Strep  Results called to and read back by:Delores Zhou RN 11/13/2022  13:16  Gram stain aer bottle: Gram positive cocci in chains resembling Strep  Positive results previously called 11/13/2022  14:47  STREPTOCOCCUS AGALACTIAE (GROUP B)  Susceptibility pending  Beta-hemolytic streptococci are routinely susceptible to   penicillins,cephalosporins and carbapenems.  * Gram stain silva bottle: Gram positive cocci in chains resembling Strep  Results called to and read back by:Delores Zhou RN 11/13/2022  13:16  Gram stain aer bottle: Gram positive cocci in chains resembling Strep  Positive results previously called 11/13/2022  14:48  STREPTOCOCCUS AGALACTIAE (GROUP B)  Beta-hemolytic streptococci are routinely susceptible to   penicillins,cephalosporins and carbapenems.  For susceptibility see order #[id number]  *     BMP:   Recent Labs   Lab 11/14/22  0536   *   *   K 3.7   CL 91*   CO2 28   BUN 26*   CREATININE 1.4   CALCIUM 8.6*     CBC:   Recent Labs   Lab 11/13/22 0615 11/14/22  0536   WBC 22.30* 16.14*   HGB 11.0* 9.4*   HCT 32.6* 27.9*    406     CMP:   Recent Labs   Lab 11/13/22 0615 11/14/22  0536   * 130*   K 4.2 3.7   CL 89* 91*   CO2 24 28   *  263*   BUN 28* 26*   CREATININE 1.7* 1.4   CALCIUM 8.6* 8.6*   ANIONGAP 19* 11     Wound Culture: No results for input(s): LABAERO in the last 4320 hours.  All pertinent labs within the past 24 hours have been reviewed.    Significant Imaging: I have reviewed all pertinent imaging results/findings within the past 24 hours.

## 2022-11-15 NOTE — ASSESSMENT & PLAN NOTE
--BCx show sensitivities to both Rocephin and IV Vancomycin-   --Staphylococcus sensitivities still pending at the time of this documentation  --repeat BCx obtained on 11/14 no growth to date- continue to follow  --continue current IV abx regimen for now, will reach out to ID about de-escalating antibiotic therapy to just vancomycin

## 2022-11-15 NOTE — CONSULTS
[facility] - Med Surg  Initial Discharge Assessment       Primary Care Provider: Thong Hidalgo Jr, MD    Admission Diagnosis: Acute osteomyelitis of left foot [M86.172]  Chest pain [R07.9]  Left foot infection [L08.9]  Gas gangrene of foot [A48.0]    Admission Date: 11/12/2022  Expected Discharge Date:     Discharge Barriers Identified: None    Payor: MEDICAID / Plan: PENDING MEDICAID / Product Type: Government /     Extended Emergency Contact Information  Primary Emergency Contact: Conchita Paris   [place]  Mobile Phone: [phone]  Relation: Mother    Discharge Plan A: Home  Discharge Plan B: Home    No Pharmacies Listed    Initial Assessment (most recent)       Adult Discharge Assessment - 11/15/22 0932          Discharge Assessment    Assessment Type Discharge Planning Assessment     Confirmed/corrected address, phone number and insurance Yes     Confirmed Demographics Correct on Facesheet     Source of Information patient     Communicated KIT with patient/caregiver Date not available/Unable to determine     Reason For Admission acute osteomyelitits     Lives With alone     Facility Arrived From: home     Do you expect to return to your current living situation? Yes     Do you have help at home or someone to help you manage your care at home? No     Prior to hospitilization cognitive status: Alert/Oriented     Current cognitive status: Alert/Oriented     Walking or Climbing Stairs Difficulty none     Dressing/Bathing Difficulty none     Equipment Currently Used at Home glucometer     Readmission within 30 days? No     Patient currently being followed by outpatient case management? No     Do you currently have service(s) that help you manage your care at home? No     Do you take prescription medications? Yes     Do you have prescription coverage? No     Do you have any problems affording any of your prescribed medications? Yes     If yes, what medications? Gabapentin, Metformin, Trulicity,  Tresiva, ozempic     Is the patient taking medications as prescribed? no     Who is going to help you get home at discharge? mother     How do you get to doctors appointments? family or friend will provide;car, drives self     Are you on dialysis? No     Do you take coumadin? No     Discharge Plan A Home     Discharge Plan B Home     DME Needed Upon Discharge  none     Discharge Plan discussed with: Patient     Discharge Barriers Identified None        Physical Activity    On average, how many days per week do you engage in moderate to strenuous exercise (like a brisk walk)? 0 days     On average, how many minutes do you engage in exercise at this level? 0 min        Financial Resource Strain    How hard is it for you to pay for the very basics like food, housing, medical care, and heating? Not hard at all        Housing Stability    In the last 12 months, was there a time when you were not able to pay the mortgage or rent on time? No     In the last 12 months, how many places have you lived? 1     In the last 12 months, was there a time when you did not have a steady place to sleep or slept in a shelter (including now)? No        Transportation Needs    In the past 12 months, has lack of transportation kept you from medical appointments or from getting medications? No     In the past 12 months, has lack of transportation kept you from meetings, work, or from getting things needed for daily living? No        Food Insecurity    Within the past 12 months, you worried that your food would run out before you got the money to buy more. Never true     Within the past 12 months, the food you bought just didn't last and you didn't have money to get more. Never true        Stress    Do you feel stress - tense, restless, nervous, or anxious, or unable to sleep at night because your mind is troubled all the time - these days? Very much        Social Connections    In a typical week, how many times do you talk on the phone with  family, friends, or neighbors? More than three times a week     How often do you get together with friends or relatives? More than three times a week     How often do you attend Anabaptism or Mosque services? Never     Do you belong to any clubs or organizations such as Anabaptism groups, unions, fraternal or athletic groups, or school groups? No     How often do you attend meetings of the clubs or organizations you belong to? Never     Are you , , , , never , or living with a partner?         Alcohol Use    Q1: How often do you have a drink containing alcohol? 2-4 times a month     Q2: How many drinks containing alcohol do you have on a typical day when you are drinking? 1 or 2     Q3: How often do you have six or more drinks on one occasion? Never                   Initial assessment completed with patient. Patient reports independence with ADL's  prior to hospitalization. Patient uses a glucometer at home. Patient currently has no cm needs upon DC. CM will continue following to assist with other needs.     CM spoke with patient regarding lack of insurance coverage and his inability to cover medications. Cm informed that if medicaid status is approved upon dc, then patient can use new insurance to cover medication. Additional, cm will check for any rx co-pay cards or discounts that may be available to assist with medication coverage in the event that insurance is not approved.       CM provided a transitional care folder, information on advanced directives, information on pharmacy bedside delivery, and discharge planning begins on admission with contact information for any needs/questions.

## 2022-11-15 NOTE — PROGRESS NOTES
[facility] Lab ([facility] Medicine  Progress Note    Patient Name: Joseph Paris Jr.  MRN: [id number]  Patient Class: IP- Inpatient   Admission Date: 11/12/2022  Length of Stay: 2 days  Attending Physician: Jas Killian MD  Primary Care Provider: Thong Hidalgo Jr, MD        Subjective:     Principal Problem:Bacteremia due to group B Streptococcus        HPI:  52 y.o. male patient with a PMHx of HTN, DM, and CAD presents to the Emergency Department for evaluation of Bilateral Feet Swelling which onset gradually within the past week. The pt last saw their wound care three weeks ago. The pt lost his health insurance coverage and decided to take care of the wounds himself. The pt self drained pus from his wounds and used a heated water massager on his feet; reports feet look more infected than before. Symptoms are constant and moderate in severity. No mitigating or exacerbating factors reported. Associated sxs include fever and SOB. Patient denies any CP, N/V, weakness, dysuria, and all other sxs at this time      Overview/Hospital Course:  S/p I&D of foot per Podiatry, recommended MRI and angio with possible referral to vascular surgery. Concerns for viability of flap due to appearance of wounds. Will likely need to return to the OR later this week. Wound cultures collected during procedure, Blood culture: gram positive cocci in chains in both bottles, adjusted antibiotic regimen, added vancomycin. On 11/15- Blood cultures ultimately grew MDR group B strep, but sensitive to both Rocephin and Vancomycin. Staphylococcus speciated as well in blood, but sensitivities still pending. Angiogram ordered by Vascular surgery tentatively scheduled to be done on 11/15.       Interval History: No acute events overnight. Doing well this AM with no complaints at our encounter. Denies CP, SOB, Abdominal pain, fevers/chills. Evaluated prior to angiogram.       Objective:     Vital Signs (Most Recent):  Temp: 98.4  °F (36.9 °C) (11/15/22 1204)  Pulse: 82 (11/15/22 1204)  Resp: 17 (11/15/22 1204)  BP: 138/68 (11/15/22 1204)  SpO2: 98 % (11/15/22 1204) Vital Signs (24h Range):  Temp:  [98.1 °F (36.7 °C)-98.5 °F (36.9 °C)] 98.4 °F (36.9 °C)  Pulse:  [79-89] 82  Resp:  [17-18] 17  SpO2:  [96 %-98 %] 98 %  BP: (128-148)/(61-68) 138/68     Weight: 112 kg (246 lb 14.6 oz)  Body mass index is 30.86 kg/m².    Intake/Output Summary (Last 24 hours) at 11/15/2022 1446  Last data filed at 11/15/2022 0936  Gross per 24 hour   Intake 593.22 ml   Output --   Net 593.22 ml      Physical Exam  GEN: No acute distress, pleasant, body habitus normal  HEENT: atraumatic and normocephalic  CARDS: regular rate and rhythm, no m/g, pulses are NOT palpable in bilateral LE  PULM: breathing comfortably on room air, chest symmetric, nonlabored, no abnormal breath sounds on auscultation  SKIN: left foot bandaged, dressing is c/d/I- R foot bandaged, but visualized during bandage change, betadine covers wound, still no active drainage on inspection    ABD: nontender, nondistended, soft, no organomegaly, BS+  Neuro: Alert and oriented x3, CN's I-IX grossly intact, sensation and motor intact; follows directions and answers questions appropriately      Significant Labs: All pertinent labs within the past 24 hours have been reviewed.  BMP:   Recent Labs   Lab 11/15/22  0534   *   *   K 4.5   CL 92*   CO2 25   BUN 23*   CREATININE 1.3   CALCIUM 8.7     CBC:   Recent Labs   Lab 11/14/22  0536 11/15/22  0637   WBC 16.14* 9.85   HGB 9.4* 8.8*   HCT 27.9* 26.3*    402     CMP:   Recent Labs   Lab 11/14/22  0536 11/15/22  0534   * 128*   K 3.7 4.5   CL 91* 92*   CO2 28 25   * 264*   BUN 26* 23*   CREATININE 1.4 1.3   CALCIUM 8.6* 8.7   ANIONGAP 11 11     Cardiac Markers: No results for input(s): CKMB, MYOGLOBIN, BNP, TROPISTAT in the last 48 hours.  Coagulation: No results for input(s): PT, INR, APTT in the last 48 hours.  Lactic Acid:  No results for input(s): LACTATE in the last 48 hours.  Lipid Panel: No results for input(s): CHOL, HDL, LDLCALC, TRIG, CHOLHDL in the last 48 hours.  Magnesium: No results for input(s): MG in the last 48 hours.  Troponin: No results for input(s): TROPONINI, TROPONINIHS in the last 48 hours.  Urine Studies: No results for input(s): COLORU, APPEARANCEUA, PHUR, SPECGRAV, PROTEINUA, GLUCUA, KETONESU, BILIRUBINUA, OCCULTUA, NITRITE, UROBILINOGEN, LEUKOCYTESUR, RBCUA, WBCUA, BACTERIA, SQUAMEPITHEL, HYALINECASTS in the last 48 hours.    Invalid input(s): WRIGHTSUR    Significant Imaging: I have reviewed all pertinent imaging results/findings within the past 24 hours.    Imaging Results               X-Ray Foot Complete Left (Final result)  Result time 11/12/22 19:55:06      Final result by Renzo Christopher MD (11/12/22 19:55:06)                   Impression:      Soft tissue emphysema and irregular margin of the distal aspect of the 1st metatarsal strongly concerning for acute osteomyelitis.    This report was flagged in Epic as abnormal.      Electronically signed by: Renzo Christopher  Date:    11/12/2022  Time:    19:55               Narrative:    EXAMINATION:  XR FOOT COMPLETE 3 VIEW LEFT    CLINICAL HISTORY:  .  Local infection of the skin and subcutaneous tissue, unspecified    TECHNIQUE:  AP, lateral and oblique views of the left foot were performed.    COMPARISON:  None    FINDINGS:  Soft tissue emphysema over the 1st digit and dorsum of the foot strongly concerning for infection.  Irregular margin of the 1st metatarsal concerning for osteomyelitis.  Correlation with MRI with contrast would be advised if clinically feasible and the patient is compatible.                                          Assessment/Plan:      * Bacteremia due to group B Streptococcus  --BCx show sensitivities to both Rocephin and IV Vancomycin-   --Staphylococcus sensitivities still pending at the time of this documentation  --repeat BCx  obtained on 11/14 no growth to date- continue to follow  --continue current IV abx regimen for now, will reach out to ID about de-escalating antibiotic therapy to just vancomycin    Decubitus ulcer of right heel  --wound care addressed, betadine, cleaned wound then bandaged   --angiogram pending      Gas gangrene of foot  Management per Podiatry, vascular surgery  --S/P I&D 11/13  --Angiogram pending      Diabetes mellitus  --last A1c on file: 10.8%  --Home medications include trulicity, glipizide, metformin  --holding home PO medications  --glucoses while hospitalized have been uncontrolled  --total SSI last 24 hours 19 units - will increase basal to 18 units QHS  --attempted to modify SSI but was informed by pharmacy such modifications are not allowed at [facility] facilities  --maintain mod dose SSI as ordered  --Accuchecks ACQHS  --Fasting AM glucose goal <140      TIERA (acute kidney injury)  S/p IV fluids  Trend Cr- improving- resolved  Continue to monitor with daily labs    Severe sepsis  Improving  -Leukocytosis improving, patient afebrile  -see plan for osteo  -continue to monitor clinically and culture growth        Acute osteomyelitis of metatarsal bone of left foot  -S/p I&D on 11/13  -BC (11/13) grew group B strep- repeat cultures (11/14) no growth to date  -MRI of L FOOT: Large ulceration extending along 1st metatarsal shaft to 1st proximal phalanx. Abnormal bone marrow signal enhancement compatible with osteomyelitis.   -Podiatry rec angiogram of left leg- vascular surgery consulted  -Angiogram of bilateral LE's on 11/15- pending  -ID consulted for abx guidance- continue IV cefepime/Vanc          VTE Risk Mitigation (From admission, onward)           Ordered     heparin (porcine) injection  As needed (PRN)         11/15/22 1436     enoxaparin injection 40 mg  Daily         11/12/22 2232     IP VTE HIGH RISK PATIENT  Once         11/12/22 2232     Place sequential compression device  Until discontinued          11/12/22 2232                    Discharge Planning   KIT:      Code Status: Full Code   Is the patient medically ready for discharge?:     Reason for patient still in hospital (select all that apply): Treatment and Imaging  Discharge Plan A: Home                  Jas Killian MD  Department of Hospital Medicine   [facility] - [facility] ([facility])

## 2022-11-15 NOTE — ASSESSMENT & PLAN NOTE
IV abx  Podiatry following  S/p I&D 11/13  BC: gram+ cocci in both bottles, repeat tomorrow  MRI foot today  Podiatry rec angiogram of left leg, possible vascular surgery consult  ESR, CRP elevated  ID consulted for abx guidance

## 2022-11-15 NOTE — PROGRESS NOTES
"Pharmacokinetic Assessment Follow Up: IV Vancomycin    Vancomycin serum concentration assessment(s):    The random level was drawn correctly and can be used to guide therapy at this time. The measurement is below the desired definitive target range of 15 to 20 mcg/mL.    Vancomycin Regimen Plan:    Change regimen to Vancomycin 1500 mg IV every 12 hours with next serum trough concentration measured at 0930 prior to 3rd dose on 11/16    Drug levels (last 3 results):  Recent Labs   Lab Result Units 11/14/22  0011 11/14/22  1425 11/15/22  0534   Vancomycin, Random ug/mL 15.3 9.8 13.9       Pharmacy will continue to follow and monitor vancomycin.    Please contact pharmacy at extension [phone] for questions regarding this assessment.    Thank you for the consult,   Cesia Randall       Patient brief summary:  Joseph Paris Jr. is a 52 y.o. male initiated on antimicrobial therapy with IV Vancomycin for treatment of bone/joint infection    The patient's current regimen is 1500mg every 12 hours    Drug Allergies:   Review of patient's allergies indicates:   Allergen Reactions    Lisinopril Swelling    Penicillins Other (See Comments)     Pt unsure of reaction, stating "I just know I'm allergic"       Actual Body Weight:   112kg    Renal Function:   Estimated Creatinine Clearance: 87.7 mL/min (based on SCr of 1.3 mg/dL).,     Dialysis Method (if applicable):  N/A    CBC (last 72 hours):  Recent Labs   Lab Result Units 11/12/22  1937 11/13/22  0615 11/14/22  0536 11/15/22  0637   WBC K/uL 22.99* 22.30* 16.14* 9.85   Hemoglobin g/dL 11.6* 11.0* 9.4* 8.8*   Hematocrit % 34.2* 32.6* 27.9* 26.3*   Platelets K/uL 378 367 406 402   Gran % % 86.0* 84.0* 76.7* 62.4   Lymph % % 6.7* 6.9* 11.9* 20.9   Mono % % 6.0 7.7 9.0 11.8   Eosinophil % % 0.1 0.4 1.2 3.0   Basophil % % 0.3 0.3 0.3 0.5   Differential Method  Automated Automated Automated Automated       Metabolic Panel (last 72 hours):  Recent Labs   Lab Result Units " 11/12/22  1937 11/13/22  0615 11/14/22  0536 11/15/22  0534   Sodium mmol/L 131* 132* 130* 128*   Potassium mmol/L 4.2 4.2 3.7 4.5   Chloride mmol/L 91* 89* 91* 92*   CO2 mmol/L 23 24 28 25   Glucose mg/dL 267* 245* 263* 264*   BUN mg/dL 27* 28* 26* 23*   Creatinine mg/dL 2.0* 1.7* 1.4 1.3   Albumin g/dL 2.5*  --   --   --    Total Bilirubin mg/dL 1.9*  --   --   --    Alkaline Phosphatase U/L 260*  --   --   --    AST U/L 43*  --   --   --    ALT U/L 12  --   --   --        Vancomycin Administrations:  vancomycin given in the last 96 hours                     vancomycin 1.5 g in dextrose 5 % 250 mL IVPB (ready to mix) (mg) 1,500 mg New Bag 11/15/22 1038    vancomycin 1.5 g in dextrose 5 % 250 mL IVPB (ready to mix) ()  Restarted 11/14/22 1824     1,500 mg New Bag  1729    vancomycin 1.25 g in dextrose 5% 250 mL IVPB (ready to mix) (mg) 1,250 mg New Bag 11/14/22 0241    vancomycin 1.5 g in dextrose 5 % 250 mL IVPB (ready to mix) (mg) 1,500 mg New Bag 11/13/22 1223    vancomycin injection (g) 1 g Given 11/13/22 1117    vancomycin 2 g in dextrose 5 % 500 mL IVPB (mg) 2,000 mg New Bag 11/12/22 2121                    Microbiologic Results:  Microbiology Results (last 7 days)       Procedure Component Value Units Date/Time    Culture, Anaerobe [[id number]] Collected: 11/13/22 1148    Order Status: Completed Specimen: Abscess from Foot, Left Updated: 11/15/22 0753     Anaerobic Culture Culture in progress    Narrative:      Left Foot Abscess    Blood culture [[id number]] Collected: 11/14/22 0536    Order Status: Completed Specimen: Blood Updated: 11/15/22 0115     Blood Culture, Routine No Growth to date    Blood culture [[id number]] Collected: 11/14/22 0536    Order Status: Completed Specimen: Blood Updated: 11/15/22 0115     Blood Culture, Routine No Growth to date    AFB Culture & Smear [[id number]] Collected: 11/13/22 1148    Order Status: Completed Specimen: Abscess from Foot, Left Updated: 11/14/22 2127     AFB Culture  & Smear Culture in progress     AFB CULTURE STAIN No acid fast bacilli seen.    Narrative:      Left Foot Abscess    Blood Culture #2 **CANNOT BE ORDERED STAT** [[id number]]  (Abnormal) Collected: 11/12/22 2021    Order Status: Completed Specimen: Blood from Peripheral, Forearm, Left Updated: 11/14/22 0849     Blood Culture, Routine Gram stain silva bottle: Gram positive cocci in chains resembling Strep      Results called to and read back by:Delores Zhou RN 11/13/2022  13:16      Gram stain aer bottle: Gram positive cocci in chains resembling Strep      Positive results previously called 11/13/2022  14:48      STREPTOCOCCUS AGALACTIAE (GROUP B)  Beta-hemolytic streptococci are routinely susceptible to   penicillins,cephalosporins and carbapenems.  For susceptibility see order #[id number]      Blood Culture #1 **CANNOT BE ORDERED STAT** [[id number]]  (Abnormal) Collected: 11/12/22 1941    Order Status: Completed Specimen: Blood from Peripheral, Antecubital, Left Updated: 11/14/22 0845     Blood Culture, Routine Gram stain silva bottle: Gram positive cocci in chains resembling Strep      Results called to and read back by:Delores Zhou RN 11/13/2022  13:16      Gram stain aer bottle: Gram positive cocci in chains resembling Strep      Positive results previously called 11/13/2022  14:47      STREPTOCOCCUS AGALACTIAE (GROUP B)  Susceptibility pending  Beta-hemolytic streptococci are routinely susceptible to   penicillins,cephalosporins and carbapenems.      Gram stain [[id number]] Collected: 11/13/22 1148    Order Status: Completed Specimen: Abscess from Foot, Left Updated: 11/13/22 2106     Gram Stain Result Rare WBC's      Rare Gram positive cocci      Rare Gram negative rods    Narrative:      Left Foot Abscess    Aerobic culture [[id number]] Collected: 11/13/22 1148    Order Status: Sent Specimen: Abscess from Foot, Left Updated: 11/13/22 1956    Fungus culture [[id number]] Collected: 11/13/22 1148    Order Status: Sent Specimen:  Abscess from Foot, Left Updated: 11/13/22 1951    Influenza A & B by Molecular [[id number]] Collected: 11/12/22 2027    Order Status: Completed Specimen: Nasopharyngeal Swab Updated: 11/12/22 2102     Influenza A, Molecular Negative     Influenza B, Molecular Negative     Flu A & B Source Nasal swab    Influenza A & B by Molecular [[id number]] Collected: 11/12/22 1933    Order Status: Canceled Specimen: Nasopharyngeal Swab

## 2022-11-15 NOTE — HPI
" 52 y.o. male patient with a PMHx of HTN, DM, and CAD admitted with worsening lower extremity infection.  Labs and imaging test reviewed-  Blood culture- strep agalactiae 11/12/22  Arterial ultrasound-   Suggestion of mild stenosis within the left iliac artery. Suggestion of hemodynamically significant stenoses within the distal left SFA, distal popliteal artery, and tibioperoneal trunk.   Right lower extremity arterial system unremarkable.  MRI of the left foot-  Findings compatible with osteomyelitis of the 1st metatarsal and 1st proximal phalanx.    .  He was seen by podiatry and had I and d done-  Operative findings -"   Gas gangrene/ Necrotizing fasciitis to the left foot. Large abscess at the first MPJ dorsal foot progressing proximally to the anterior ankle and proximal-laterally to the dorsal proximal midfoot. Very little bleeding without tourniquet or exsanguination. "  Component      Latest Ref Rng & Units 11/14/2022 11/13/2022 11/12/2022   WBC      3.90 - 12.70 K/uL 16.14 (H) 22.30 (H) 22.99 (H)     "

## 2022-11-15 NOTE — ASSESSMENT & PLAN NOTE
Improving  -Leukocytosis improving, patient afebrile  -see plan for osteo  -continue to monitor clinically and culture growth

## 2022-11-15 NOTE — ASSESSMENT & PLAN NOTE
Will follow cultures to guide regime  Will use Vanco,Cefepime and Flagyl   Will need to treat for 6 weeks

## 2022-11-15 NOTE — ASSESSMENT & PLAN NOTE
--last A1c on file: 10.8%  --Home medications include trulicity, glipizide, metformin  --holding home PO medications  --glucoses while hospitalized have been uncontrolled  --total SSI last 24 hours 19 units - will increase basal to 15 units QHS  --will increase SSI to strict dosing   --Accuchecks ACHS  --Fasting AM glucose goal <140

## 2022-11-15 NOTE — INTERVAL H&P NOTE
The patient has been examined and the H&P has been reviewed:    I concur with the findings and no changes have occurred since H&P was written.    Anesthesia/Surgery risks, benefits and alternative options discussed and understood by patient/family.          Active Hospital Problems    Diagnosis  POA    *Acute osteomyelitis of metatarsal bone of left foot [M86.172]  Yes    Decubitus ulcer of right heel [L89.619]  Yes    Gas gangrene of foot [A48.0]  Yes    Severe sepsis [A41.9, R65.20]  Yes    TIERA (acute kidney injury) [N17.9]  Yes    Diabetes mellitus [E11.9]  Yes      Resolved Hospital Problems    Diagnosis Date Resolved POA    Abscess of foot including toes, left [L02.612] 11/13/2022 Yes

## 2022-11-15 NOTE — PROGRESS NOTES
[facility] Medicine  Progress Note    Patient Name: Joseph Paris Jr.  MRN: [id number]  Patient Class: IP- Inpatient   Admission Date: 11/12/2022  Length of Stay: 1 days  Attending Physician: Jas Killian MD  Primary Care Provider: Thong Hidalgo Jr, MD        Subjective:     Principal Problem:Acute osteomyelitis of metatarsal bone of left foot    HPI:  52 y.o. male patient with a PMHx of HTN, DM, and CAD presents to the Emergency Department for evaluation of Bilateral Feet Swelling which onset gradually within the past week. The pt last saw their wound care three weeks ago. The pt lost his health insurance coverage and decided to take care of the wounds himself. The pt self drained pus from his wounds and used a heated water massager on his feet; reports feet look more infected than before. Symptoms are constant and moderate in severity. No mitigating or exacerbating factors reported. Associated sxs include fever and SOB. Patient denies any CP, N/V, weakness, dysuria, and all other sxs at this time      Overview/Hospital Course:  S/p I&D of foot per Podiatry, recommended MRI and angio with possible referral to vascular surgery. Concerns for viability of flap due to appearance of wounds. Will likely need to return to the OR later this week. Wound cultures collected during procedure, Blood culture: gram positive cocci in chains in both bottles, adjusted antibiotic regimen, added vancomycin. Will repeat blood cultures tomorrow. Vitals stable, pain well controlled.       Interval History: Pain in R foot sharply improved after procedure per patient. Concerned about R sided heel ulcer which was visualized today, darkened tissue. No pain or swelling.     Review of Systems  Objective:     Vital Signs (Most Recent):  Temp: 99.3 °F (37.4 °C) (11/14/22 1246)  Pulse: 81 (11/14/22 1246)  Resp: 17 (11/14/22 1246)  BP: 139/65 (11/14/22 1246)  SpO2: 95 % (11/14/22 1246) Vital Signs (24h Range):  Temp:  [98  °F (36.7 °C)-99.9 °F (37.7 °C)] 99.3 °F (37.4 °C)  Pulse:  [77-97] 81  Resp:  [17-18] 17  SpO2:  [94 %-99 %] 95 %  BP: (122-156)/(60-75) 139/65     Weight: 112 kg (246 lb 14.6 oz)  Body mass index is 30.86 kg/m².    Intake/Output Summary (Last 24 hours) at 11/14/2022 1900  Last data filed at 11/14/2022 1842  Gross per 24 hour   Intake 653.22 ml   Output --   Net 653.22 ml      Physical Exam  Vitals reviewed.   Constitutional:       General: He is not in acute distress.     Appearance: Normal appearance. He is normal weight. He is not ill-appearing.   HENT:      Head: Normocephalic and atraumatic.      Nose: Nose normal. No congestion or rhinorrhea.      Mouth/Throat:      Mouth: Mucous membranes are moist.      Pharynx: Oropharynx is clear.   Eyes:      General: No scleral icterus.     Extraocular Movements: Extraocular movements intact.      Pupils: Pupils are equal, round, and reactive to light.   Cardiovascular:      Pulses: Normal pulses.      Heart sounds: Normal heart sounds. No murmur heard.    No gallop.   Pulmonary:      Effort: Pulmonary effort is normal. No respiratory distress.      Breath sounds: Normal breath sounds. No wheezing.   Abdominal:      General: Abdomen is flat. Bowel sounds are normal. There is no distension.      Palpations: Abdomen is soft.      Tenderness: There is no abdominal tenderness. There is no guarding or rebound.   Musculoskeletal:        Feet:    Skin:     Capillary Refill: Capillary refill takes less than 2 seconds.      Coloration: Skin is not jaundiced.   Neurological:      General: No focal deficit present.      Mental Status: He is alert and oriented to person, place, and time. Mental status is at baseline.      Cranial Nerves: No cranial nerve deficit.   Psychiatric:         Mood and Affect: Mood normal.         Behavior: Behavior normal.         Thought Content: Thought content normal.       Significant Labs: All pertinent labs within the past 24 hours have been  reviewed.  CBC:  Recent Labs   Lab 11/14/22  0536   WBC 16.14*   HGB 9.4*   HCT 27.9*        CMP:  Recent Labs   Lab 11/14/22  0536   CALCIUM 8.6*   *   K 3.7   CO2 28   CL 91*   BUN 26*   CREATININE 1.4     BMP:   Recent Labs   Lab 11/14/22  0536   *   *   K 3.7   CL 91*   CO2 28   BUN 26*   CREATININE 1.4   CALCIUM 8.6*     Cardiac Markers: No results for input(s): CKMB, TROPONINT, MYOGLOBIN in the last 168 hours.    Significant Imaging: I have reviewed all pertinent imaging results/findings within the past 24 hours.    Imaging Results               X-Ray Foot Complete Left (Final result)  Result time 11/12/22 19:55:06      Final result by Renzo Christopher MD (11/12/22 19:55:06)                   Impression:      Soft tissue emphysema and irregular margin of the distal aspect of the 1st metatarsal strongly concerning for acute osteomyelitis.    This report was flagged in Epic as abnormal.      Electronically signed by: Renzo Christopher  Date:    11/12/2022  Time:    19:55               Narrative:    EXAMINATION:  XR FOOT COMPLETE 3 VIEW LEFT    CLINICAL HISTORY:  .  Local infection of the skin and subcutaneous tissue, unspecified    TECHNIQUE:  AP, lateral and oblique views of the left foot were performed.    COMPARISON:  None    FINDINGS:  Soft tissue emphysema over the 1st digit and dorsum of the foot strongly concerning for infection.  Irregular margin of the 1st metatarsal concerning for osteomyelitis.  Correlation with MRI with contrast would be advised if clinically feasible and the patient is compatible.                                          Assessment/Plan:      * Acute osteomyelitis of metatarsal bone of left foot  IV abx  Podiatry following  S/p I&D 11/13  BC: gram+ cocci in both bottles, repeat tomorrow  MRI foot today  Podiatry rec angiogram of left leg, possible vascular surgery consult  ESR, CRP elevated  ID consulted for abx guidance        Decubitus ulcer of right  heel  --asked nursing to notify wound care of lesion  --follow up recs  --soft bandaging with sock covering until wound care evaluates  --Vascular surgery ordering bilateral angiogram which I think is appropriate      Gas gangrene of foot  Management per Podiatry    --S/P I&D 11/13  --Angiogram  --MRI  --Possible return to OR later this week      Diabetes mellitus  Patient's FSGs are uncontrolled due to hyperglycemia on current medication regimen.  Last A1c reviewed-   Lab Results   Component Value Date    HGBA1C 10.8 (H) 07/26/2022     Most recent fingerstick glucose reviewed-   Recent Labs   Lab 11/12/22  1941 11/12/22  2330 11/13/22  0523 11/13/22  1140   POCTGLUCOSE 261* 377* 246* 194*     Current correctional scale  Medium  Maintain anti-hyperglycemic dose as follows-   Antihyperglycemics (From admission, onward)    Start     Stop Route Frequency Ordered    11/12/22 2330  insulin detemir U-100 pen 10 Units         -- SubQ Nightly 11/12/22 2232    11/12/22 2325  insulin aspart U-100 pen 1-10 Units         -- SubQ Before meals & nightly PRN 11/12/22 2232        Hold Oral hypoglycemics while patient is in the hospital.    TIERA (acute kidney injury)    S/t sepsis vs CKD  S/p IV fluids  Trend Cr- improving- resolved    Severe sepsis  This patient does have evidence of infective focus  My overall impression is sepsis. Vital signs were reviewed and noted in progress note.  Antibiotics given-   Antibiotics (From admission, onward)    Start     Stop Route Frequency Ordered    11/13/22 1550  cefepime in dextrose 5 % IVPB 2 g         -- IV Every 8 hours (non-standard times) 11/13/22 0919 11/12/22 2053  vancomycin - pharmacy to dose  (vancomycin IVPB)        See Hyperspace for full Linked Orders Report.    -- IV pharmacy to manage frequency 11/12/22 1954        Cultures were taken-   Microbiology Results (last 7 days)     Procedure Component Value Units Date/Time    Blood Culture #2 **CANNOT BE ORDERED STAT** [[id number]]  Collected: 11/12/22 2021    Order Status: Completed Specimen: Blood from Peripheral, Forearm, Left Updated: 11/13/22 1449     Blood Culture, Routine Gram stain silva bottle: Gram positive cocci in chains resembling Strep      Results called to and read back by:Delores Zhou RN 11/13/2022  13:16      Gram stain aer bottle: Gram positive cocci in chains resembling Strep      Positive results previously called 11/13/2022  14:48    Blood Culture #1 **CANNOT BE ORDERED STAT** [[id number]] Collected: 11/12/22 1941    Order Status: Completed Specimen: Blood from Peripheral, Antecubital, Left Updated: 11/13/22 1447     Blood Culture, Routine Gram stain silva bottle: Gram positive cocci in chains resembling Strep      Results called to and read back by:Delores Zhou RN 11/13/2022  13:16      Gram stain aer bottle: Gram positive cocci in chains resembling Strep      Positive results previously called 11/13/2022  14:47    Gram stain [[id number]] Collected: 11/13/22 1148    Order Status: Sent Specimen: Abscess from Foot, Left Updated: 11/13/22 1212    Fungus culture [[id number]] Collected: 11/13/22 1148    Order Status: Sent Specimen: Abscess from Foot, Left Updated: 11/13/22 1211    Aerobic culture [[id number]] Collected: 11/13/22 1148    Order Status: Sent Specimen: Abscess from Foot, Left Updated: 11/13/22 1211    AFB Culture & Smear [[id number]] Collected: 11/13/22 1148    Order Status: Sent Specimen: Abscess from Foot, Left Updated: 11/13/22 1211    Culture, Anaerobe [[id number]] Collected: 11/13/22 1148    Order Status: Sent Specimen: Abscess from Foot, Left Updated: 11/13/22 1210    Influenza A & B by Molecular [[id number]] Collected: 11/12/22 2027    Order Status: Completed Specimen: Nasopharyngeal Swab Updated: 11/12/22 2102     Influenza A, Molecular Negative     Influenza B, Molecular Negative     Flu A & B Source Nasal swab    Influenza A & B by Molecular [[id number]] Collected: 11/12/22 1933    Order Status: Canceled Specimen:  Nasopharyngeal Swab         Latest lactate reviewed, they are-  Recent Labs   Lab 11/12/22 1937   LACTATE 1.9       Organ dysfunction indicated by Acute kidney injury  Source- Left Foot    Source control Achieved by- Cefepime    --Added Vancomycin  --Repeat Blood cultures 11/14        VTE Risk Mitigation (From admission, onward)         Ordered     enoxaparin injection 40 mg  Daily         11/12/22 2232     IP VTE HIGH RISK PATIENT  Once         11/12/22 2232     Place sequential compression device  Until discontinued         11/12/22 2232                Discharge Planning   KIT:      Code Status: Full Code   Is the patient medically ready for discharge?:     Reason for patient still in hospital (select all that apply): Patient trending condition, Laboratory test and Imaging           Jas Killian MD  Department of Hospital Medicine   [facility]

## 2022-11-15 NOTE — SUBJECTIVE & OBJECTIVE
Interval History: Pain in R foot sharply improved after procedure per patient. Concerned about R sided heel ulcer which was visualized today, darkened tissue. No pain or swelling.     Review of Systems  Objective:     Vital Signs (Most Recent):  Temp: 99.3 °F (37.4 °C) (11/14/22 1246)  Pulse: 81 (11/14/22 1246)  Resp: 17 (11/14/22 1246)  BP: 139/65 (11/14/22 1246)  SpO2: 95 % (11/14/22 1246) Vital Signs (24h Range):  Temp:  [98 °F (36.7 °C)-99.9 °F (37.7 °C)] 99.3 °F (37.4 °C)  Pulse:  [77-97] 81  Resp:  [17-18] 17  SpO2:  [94 %-99 %] 95 %  BP: (122-156)/(60-75) 139/65     Weight: 112 kg (246 lb 14.6 oz)  Body mass index is 30.86 kg/m².    Intake/Output Summary (Last 24 hours) at 11/14/2022 1900  Last data filed at 11/14/2022 1842  Gross per 24 hour   Intake 653.22 ml   Output --   Net 653.22 ml      Physical Exam  Vitals reviewed.   Constitutional:       General: He is not in acute distress.     Appearance: Normal appearance. He is normal weight. He is not ill-appearing.   HENT:      Head: Normocephalic and atraumatic.      Nose: Nose normal. No congestion or rhinorrhea.      Mouth/Throat:      Mouth: Mucous membranes are moist.      Pharynx: Oropharynx is clear.   Eyes:      General: No scleral icterus.     Extraocular Movements: Extraocular movements intact.      Pupils: Pupils are equal, round, and reactive to light.   Cardiovascular:      Pulses: Normal pulses.      Heart sounds: Normal heart sounds. No murmur heard.    No gallop.   Pulmonary:      Effort: Pulmonary effort is normal. No respiratory distress.      Breath sounds: Normal breath sounds. No wheezing.   Abdominal:      General: Abdomen is flat. Bowel sounds are normal. There is no distension.      Palpations: Abdomen is soft.      Tenderness: There is no abdominal tenderness. There is no guarding or rebound.   Musculoskeletal:        Feet:    Skin:     Capillary Refill: Capillary refill takes less than 2 seconds.      Coloration: Skin is not  jaundiced.   Neurological:      General: No focal deficit present.      Mental Status: He is alert and oriented to person, place, and time. Mental status is at baseline.      Cranial Nerves: No cranial nerve deficit.   Psychiatric:         Mood and Affect: Mood normal.         Behavior: Behavior normal.         Thought Content: Thought content normal.       Significant Labs: All pertinent labs within the past 24 hours have been reviewed.  CBC:  Recent Labs   Lab 11/14/22  0536   WBC 16.14*   HGB 9.4*   HCT 27.9*        CMP:  Recent Labs   Lab 11/14/22  0536   CALCIUM 8.6*   *   K 3.7   CO2 28   CL 91*   BUN 26*   CREATININE 1.4     BMP:   Recent Labs   Lab 11/14/22  0536   *   *   K 3.7   CL 91*   CO2 28   BUN 26*   CREATININE 1.4   CALCIUM 8.6*     Cardiac Markers: No results for input(s): CKMB, TROPONINT, MYOGLOBIN in the last 168 hours.    Significant Imaging: I have reviewed all pertinent imaging results/findings within the past 24 hours.    Imaging Results               X-Ray Foot Complete Left (Final result)  Result time 11/12/22 19:55:06      Final result by Renzo Christopher MD (11/12/22 19:55:06)                   Impression:      Soft tissue emphysema and irregular margin of the distal aspect of the 1st metatarsal strongly concerning for acute osteomyelitis.    This report was flagged in Epic as abnormal.      Electronically signed by: Renzo Christopher  Date:    11/12/2022  Time:    19:55               Narrative:    EXAMINATION:  XR FOOT COMPLETE 3 VIEW LEFT    CLINICAL HISTORY:  .  Local infection of the skin and subcutaneous tissue, unspecified    TECHNIQUE:  AP, lateral and oblique views of the left foot were performed.    COMPARISON:  None    FINDINGS:  Soft tissue emphysema over the 1st digit and dorsum of the foot strongly concerning for infection.  Irregular margin of the 1st metatarsal concerning for osteomyelitis.  Correlation with MRI with contrast would be advised if  clinically feasible and the patient is compatible.

## 2022-11-16 ENCOUNTER — ANESTHESIA EVENT (OUTPATIENT)
Dept: SURGERY | Facility: HOSPITAL | Age: 52
DRG: 853 | End: 2022-11-16
Payer: MEDICAID

## 2022-11-16 LAB
BACTERIA BLD CULT: ABNORMAL
BACTERIA SPEC AEROBE CULT: ABNORMAL
CREAT SERPL-MCNC: 1.3 MG/DL (ref 0.5–1.4)
EST. GFR  (NO RACE VARIABLE): >60 ML/MIN/1.73 M^2
POCT GLUCOSE: 207 MG/DL (ref 70–110)
POCT GLUCOSE: 295 MG/DL (ref 70–110)
POCT GLUCOSE: 333 MG/DL (ref 70–110)
POCT GLUCOSE: 374 MG/DL (ref 70–110)
VANCOMYCIN TROUGH SERPL-MCNC: 18.6 UG/ML (ref 10–22)

## 2022-11-16 PROCEDURE — 25000003 PHARM REV CODE 250: Performed by: STUDENT IN AN ORGANIZED HEALTH CARE EDUCATION/TRAINING PROGRAM

## 2022-11-16 PROCEDURE — 63600175 PHARM REV CODE 636 W HCPCS: Performed by: STUDENT IN AN ORGANIZED HEALTH CARE EDUCATION/TRAINING PROGRAM

## 2022-11-16 PROCEDURE — 82565 ASSAY OF CREATININE: CPT | Performed by: SURGERY

## 2022-11-16 PROCEDURE — 21400001 HC TELEMETRY ROOM

## 2022-11-16 PROCEDURE — 25000003 PHARM REV CODE 250: Performed by: SURGERY

## 2022-11-16 PROCEDURE — 63600175 PHARM REV CODE 636 W HCPCS: Performed by: SURGERY

## 2022-11-16 PROCEDURE — 36415 COLL VENOUS BLD VENIPUNCTURE: CPT | Performed by: SURGERY

## 2022-11-16 PROCEDURE — 25000003 PHARM REV CODE 250: Performed by: NURSE PRACTITIONER

## 2022-11-16 PROCEDURE — 80202 ASSAY OF VANCOMYCIN: CPT | Performed by: SURGERY

## 2022-11-16 RX ORDER — INSULIN ASPART 100 [IU]/ML
5 INJECTION, SOLUTION INTRAVENOUS; SUBCUTANEOUS
Status: DISCONTINUED | OUTPATIENT
Start: 2022-11-17 | End: 2022-11-29 | Stop reason: HOSPADM

## 2022-11-16 RX ADMIN — VANCOMYCIN HYDROCHLORIDE 1250 MG: 1.25 INJECTION, POWDER, LYOPHILIZED, FOR SOLUTION INTRAVENOUS at 11:11

## 2022-11-16 RX ADMIN — Medication 1 TABLET: at 11:11

## 2022-11-16 RX ADMIN — ENOXAPARIN SODIUM 40 MG: 40 INJECTION SUBCUTANEOUS at 05:11

## 2022-11-16 RX ADMIN — Medication 1 TABLET: at 08:11

## 2022-11-16 RX ADMIN — INSULIN ASPART 8 UNITS: 100 INJECTION, SOLUTION INTRAVENOUS; SUBCUTANEOUS at 06:11

## 2022-11-16 RX ADMIN — INSULIN ASPART 6 UNITS: 100 INJECTION, SOLUTION INTRAVENOUS; SUBCUTANEOUS at 11:11

## 2022-11-16 RX ADMIN — INSULIN ASPART 10 UNITS: 100 INJECTION, SOLUTION INTRAVENOUS; SUBCUTANEOUS at 05:11

## 2022-11-16 RX ADMIN — OXYCODONE HYDROCHLORIDE 5 MG: 5 TABLET ORAL at 05:11

## 2022-11-16 RX ADMIN — CEFEPIME HYDROCHLORIDE 2 G: 2 INJECTION, SOLUTION INTRAVENOUS at 08:11

## 2022-11-16 RX ADMIN — CEFEPIME HYDROCHLORIDE 2 G: 2 INJECTION, SOLUTION INTRAVENOUS at 05:11

## 2022-11-16 RX ADMIN — Medication 1 TABLET: at 05:11

## 2022-11-16 RX ADMIN — INSULIN ASPART 2 UNITS: 100 INJECTION, SOLUTION INTRAVENOUS; SUBCUTANEOUS at 09:11

## 2022-11-16 RX ADMIN — AMLODIPINE BESYLATE 5 MG: 5 TABLET ORAL at 08:11

## 2022-11-16 RX ADMIN — INSULIN DETEMIR 20 UNITS: 100 INJECTION, SOLUTION SUBCUTANEOUS at 09:11

## 2022-11-16 NOTE — PROGRESS NOTES
"[facility]  Infectious Disease  Progress Note    Patient Name: Joseph Paris Jr.  MRN: [id number]  Admission Date: 11/12/2022  Length of Stay: 3 days  Attending Physician: Jas Killian MD  Primary Care Provider: Thong Hidalgo Jr, MD    Isolation Status: No active isolations  Assessment/Plan:      * Bacteremia due to group B Streptococcus  Will continue Rocephin -source is the foot     Gas gangrene of foot  S/p I and D , podiatry follow up   Continue Vanco. Cefepime, Flagyl    Severe sepsis  Related to foot infection-will continue vanco/cefepime/flagyl     Will adjust with final cultures     Acute osteomyelitis of metatarsal bone of left foot  Will follow cultures to guide regime  Will use Vanco,Cefepime and Flagyl   Will need to treat for 6 weeks    11/15- will follow final drug susceptibility of staph Aureus .  Continue Vanco,Cefepime and Flagyl         Anticipated Disposition:     Thank you for your consult. I will follow-up with patient. Please contact us if you have any additional questions.    Compa Leal MD, [facility]  Infectious Disease  [facility]    Subjective:     Principal Problem:Bacteremia due to group B Streptococcus    HPI:    52 y.o. male patient with a PMHx of HTN, DM, and CAD admitted with worsening lower extremity infection.  Labs and imaging test reviewed-  Blood culture- strep agalactiae 11/12/22  Arterial ultrasound-   Suggestion of mild stenosis within the left iliac artery. Suggestion of hemodynamically significant stenoses within the distal left SFA, distal popliteal artery, and tibioperoneal trunk.   Right lower extremity arterial system unremarkable.  MRI of the left foot-  Findings compatible with osteomyelitis of the 1st metatarsal and 1st proximal phalanx.    .  He was seen by podiatry and had I and d done-  Operative findings -"   Gas gangrene/ Necrotizing fasciitis to the left foot. Large abscess at the first MPJ dorsal foot progressing proximally to the anterior " "ankle and proximal-laterally to the dorsal proximal midfoot. Very little bleeding without tourniquet or exsanguination. "  Component      Latest Ref Rng & Units 11/14/2022 11/13/2022 11/12/2022   WBC      3.90 - 12.70 K/uL 16.14 (H) 22.30 (H) 22.99 (H)     Interval History:  52 year old man with foot osteomyelitis .  Blood cultures- 11/12/22-  Strep and staph   Wound cultures- 11/13- strep  He is afebrile   Review of Systems   Constitutional:  Positive for activity change, appetite change and fatigue. Negative for chills and fever.   Respiratory:  Positive for shortness of breath. Negative for chest tightness and wheezing.    Cardiovascular:  Negative for chest pain and leg swelling.   Gastrointestinal:  Positive for diarrhea. Negative for abdominal pain, nausea and vomiting.   Genitourinary:  Negative for flank pain.   Musculoskeletal:  Negative for back pain, joint swelling and myalgias.   Skin:  Positive for color change and wound.   Neurological:  Negative for syncope, weakness and light-headedness.   Psychiatric/Behavioral:  Negative for confusion.    Objective:     Vital Signs (Most Recent):  Temp: 98.6 °F (37 °C) (11/16/22 0350)  Pulse: 73 (11/16/22 0350)  Resp: 19 (11/16/22 0522)  BP: (!) 155/77 (11/16/22 0350)  SpO2: 98 % (11/16/22 0350)   Vital Signs (24h Range):  Temp:  [98 °F (36.7 °C)-98.8 °F (37.1 °C)] 98.6 °F (37 °C)  Pulse:  [73-89] 73  Resp:  [14-19] 19  SpO2:  [96 %-99 %] 98 %  BP: (101-171)/(53-79) 155/77     Weight: 112 kg (246 lb 14.6 oz)  Body mass index is 30.86 kg/m².    Estimated Creatinine Clearance: 87.7 mL/min (based on SCr of 1.3 mg/dL).    Physical Exam  Vitals and nursing note reviewed.   HENT:      Head: Normocephalic and atraumatic.   Eyes:      Pupils: Pupils are equal, round, and reactive to light.   Neck:      Thyroid: No thyromegaly.   Cardiovascular:      Rate and Rhythm: Normal rate and regular rhythm.   Pulmonary:      Effort: Pulmonary effort is normal. No respiratory " distress.      Breath sounds: No wheezing.   Abdominal:      General: Bowel sounds are normal.      Palpations: Abdomen is soft.      Tenderness: There is no abdominal tenderness.   Musculoskeletal:      Cervical back: Normal range of motion and neck supple.   Skin:     Comments: Dressing noted over lower extremity        Significant Labs: Blood Culture:   Recent Labs   Lab 11/12/22 1941 11/12/22 2021 11/14/22  0536   LABBLOO Gram stain silva bottle: Gram positive cocci in chains resembling Strep  Results called to and read back by:Delores Zhou RN 11/13/2022  13:16  Gram stain aer bottle: Gram positive cocci in chains resembling Strep  Positive results previously called 11/13/2022  14:47  STREPTOCOCCUS AGALACTIAE (GROUP B)  Susceptibility pending  Beta-hemolytic streptococci are routinely susceptible to   penicillins,cephalosporins and carbapenems.  *  STAPHYLOCOCCUS AUREUS  Susceptibility pending  ID consult required at [facility].  * Gram stain silva bottle: Gram positive cocci in chains resembling Strep  Results called to and read back by:Delores Zhou RN 11/13/2022  13:16  Gram stain aer bottle: Gram positive cocci in chains resembling Strep  Positive results previously called 11/13/2022  14:48  STREPTOCOCCUS AGALACTIAE (GROUP B)  Beta-hemolytic streptococci are routinely susceptible to   penicillins,cephalosporins and carbapenems.  For susceptibility see order #[id number]  *  STAPHYLOCOCCUS AUREUS  ID consult required at [facility].For   susceptibility see order #[id number]  * No Growth to date  No Growth to date  No Growth to date  No Growth to date     BMP:   Recent Labs   Lab 11/15/22  0534   *   *   K 4.5   CL 92*   CO2 25   BUN 23*   CREATININE 1.3   CALCIUM 8.7     CBC:   Recent Labs   Lab 11/15/22  0637   WBC 9.85   HGB 8.8*   HCT 26.3*        CMP:   Recent Labs   Lab 11/15/22  0534   *   K 4.5   CL 92*   CO2 25    *   BUN 23*   CREATININE 1.3   CALCIUM 8.7   ANIONGAP 11       Significant Imaging: I have reviewed all pertinent imaging results/findings within the past 24 hours.

## 2022-11-16 NOTE — ASSESSMENT & PLAN NOTE
Will follow cultures to guide regime  Will use Vanco,Cefepime and Flagyl   Will need to treat for 6 weeks    11/15- will follow final drug susceptibility of staph Aureus .  Continue Vanco,Cefepime and Flagyl

## 2022-11-16 NOTE — ASSESSMENT & PLAN NOTE
Related to foot infection-will continue vanco/cefepime/flagyl     Will adjust with final cultures

## 2022-11-16 NOTE — SUBJECTIVE & OBJECTIVE
Interval History: Re-cannulization of R femoral a. successful. No acute events overnight. Doing well this AM with no complaints at our encounter. Denies CP, SOB, Abdominal pain, fevers/chills.  Plans to take to OR for debridement and wound vac placement    Objective:     Vital Signs (Most Recent):  Temp: 98.6 °F (37 °C) (11/16/22 1555)  Pulse: 83 (11/16/22 1555)  Resp: 17 (11/16/22 1555)  BP: 139/74 (11/16/22 1555)  SpO2: 97 % (11/16/22 1555) Vital Signs (24h Range):  Temp:  [97.9 °F (36.6 °C)-98.6 °F (37 °C)] 98.6 °F (37 °C)  Pulse:  [73-98] 83  Resp:  [14-20] 17  SpO2:  [93 %-99 %] 97 %  BP: (101-155)/(53-77) 139/74     Weight: 112 kg (246 lb 14.6 oz)  Body mass index is 30.86 kg/m².    Intake/Output Summary (Last 24 hours) at 11/16/2022 1728  Last data filed at 11/16/2022 1338  Gross per 24 hour   Intake 660 ml   Output --   Net 660 ml      Physical Exam  Largely unchanged  GEN: No acute distress, pleasant, body habitus normal  HEENT: atraumatic and normocephalic  CARDS: regular rate and rhythm, no m/g, pulses are NOT palpable in bilateral LE  PULM: breathing comfortably on room air, chest symmetric, nonlabored, no abnormal breath sounds on auscultation  SKIN: left foot bandaged, dressing is c/d/I- R foot bandaged, but visualized during bandage change, betadine covers wound, still no active drainage on inspection  ABD: nontender, nondistended, soft, no organomegaly, BS+  Neuro: Alert and oriented x3, CN's I-IX grossly intact, sensation and motor intact; follows directions and answers questions appropriately    Significant Labs: All pertinent labs within the past 24 hours have been reviewed.  Blood Culture: No results for input(s): LABBLOO in the last 48 hours.  BMP:   Recent Labs   Lab 11/15/22  0534 11/16/22  0937   *  --    *  --    K 4.5  --    CL 92*  --    CO2 25  --    BUN 23*  --    CREATININE 1.3 1.3   CALCIUM 8.7  --      CBC:   Recent Labs   Lab 11/15/22  0637   WBC 9.85   HGB 8.8*   HCT  26.3*        Cardiac Markers: No results for input(s): CKMB, MYOGLOBIN, BNP, TROPISTAT in the last 48 hours.  Lipid Panel: No results for input(s): CHOL, HDL, LDLCALC, TRIG, CHOLHDL in the last 48 hours.  TSH: No results for input(s): TSH in the last 4320 hours.  Urine Culture: No results for input(s): LABURIN in the last 48 hours.  Urine Studies: No results for input(s): COLORU, APPEARANCEUA, PHUR, SPECGRAV, PROTEINUA, GLUCUA, KETONESU, BILIRUBINUA, OCCULTUA, NITRITE, UROBILINOGEN, LEUKOCYTESUR, RBCUA, WBCUA, BACTERIA, SQUAMEPITHEL, HYALINECASTS in the last 48 hours.    Invalid input(s): WRIGHTSUR    Significant Imaging: I have reviewed all pertinent imaging results/findings within the past 24 hours.  Imaging Results               X-Ray Foot Complete Left (Final result)  Result time 11/12/22 19:55:06      Final result by Renzo Christopher MD (11/12/22 19:55:06)                   Impression:      Soft tissue emphysema and irregular margin of the distal aspect of the 1st metatarsal strongly concerning for acute osteomyelitis.    This report was flagged in Epic as abnormal.      Electronically signed by: Renzo Christopher  Date:    11/12/2022  Time:    19:55               Narrative:    EXAMINATION:  XR FOOT COMPLETE 3 VIEW LEFT    CLINICAL HISTORY:  .  Local infection of the skin and subcutaneous tissue, unspecified    TECHNIQUE:  AP, lateral and oblique views of the left foot were performed.    COMPARISON:  None    FINDINGS:  Soft tissue emphysema over the 1st digit and dorsum of the foot strongly concerning for infection.  Irregular margin of the 1st metatarsal concerning for osteomyelitis.  Correlation with MRI with contrast would be advised if clinically feasible and the patient is compatible.

## 2022-11-16 NOTE — PLAN OF CARE
Patient remains free of falls and injuries during shift. Minimal pain noted to right foot due to dressing feeling too tight. Redressed and no signs of pain or discomfort. IV antibiotics given as ordered. Blood glucose monitoring. Insulin given per orders. Dressing change planned for 0600. Chart check complete. Will continue to monitor.

## 2022-11-16 NOTE — SUBJECTIVE & OBJECTIVE
Interval History:  52 year old man with foot osteomyelitis .  Blood cultures- 11/12/22-  Strep and staph   Wound cultures- 11/13- strep  He is afebrile   Review of Systems   Constitutional:  Positive for activity change, appetite change and fatigue. Negative for chills and fever.   Respiratory:  Positive for shortness of breath. Negative for chest tightness and wheezing.    Cardiovascular:  Negative for chest pain and leg swelling.   Gastrointestinal:  Positive for diarrhea. Negative for abdominal pain, nausea and vomiting.   Genitourinary:  Negative for flank pain.   Musculoskeletal:  Negative for back pain, joint swelling and myalgias.   Skin:  Positive for color change and wound.   Neurological:  Negative for syncope, weakness and light-headedness.   Psychiatric/Behavioral:  Negative for confusion.    Objective:     Vital Signs (Most Recent):  Temp: 98.6 °F (37 °C) (11/16/22 0350)  Pulse: 73 (11/16/22 0350)  Resp: 19 (11/16/22 0522)  BP: (!) 155/77 (11/16/22 0350)  SpO2: 98 % (11/16/22 0350)   Vital Signs (24h Range):  Temp:  [98 °F (36.7 °C)-98.8 °F (37.1 °C)] 98.6 °F (37 °C)  Pulse:  [73-89] 73  Resp:  [14-19] 19  SpO2:  [96 %-99 %] 98 %  BP: (101-171)/(53-79) 155/77     Weight: 112 kg (246 lb 14.6 oz)  Body mass index is 30.86 kg/m².    Estimated Creatinine Clearance: 87.7 mL/min (based on SCr of 1.3 mg/dL).    Physical Exam  Vitals and nursing note reviewed.   HENT:      Head: Normocephalic and atraumatic.   Eyes:      Pupils: Pupils are equal, round, and reactive to light.   Neck:      Thyroid: No thyromegaly.   Cardiovascular:      Rate and Rhythm: Normal rate and regular rhythm.   Pulmonary:      Effort: Pulmonary effort is normal. No respiratory distress.      Breath sounds: No wheezing.   Abdominal:      General: Bowel sounds are normal.      Palpations: Abdomen is soft.      Tenderness: There is no abdominal tenderness.   Musculoskeletal:      Cervical back: Normal range of motion and neck supple.    Skin:     Comments: Dressing noted over lower extremity        Significant Labs: Blood Culture:   Recent Labs   Lab 11/12/22 1941 11/12/22 2021 11/14/22  0536   LABBLOO Gram stain silva bottle: Gram positive cocci in chains resembling Strep  Results called to and read back by:Delores Zhou RN 11/13/2022  13:16  Gram stain aer bottle: Gram positive cocci in chains resembling Strep  Positive results previously called 11/13/2022  14:47  STREPTOCOCCUS AGALACTIAE (GROUP B)  Susceptibility pending  Beta-hemolytic streptococci are routinely susceptible to   penicillins,cephalosporins and carbapenems.  *  STAPHYLOCOCCUS AUREUS  Susceptibility pending  ID consult required at [facility] and [facility] locations.  * Gram stain silva bottle: Gram positive cocci in chains resembling Strep  Results called to and read back by:Delores Zhou RN 11/13/2022  13:16  Gram stain aer bottle: Gram positive cocci in chains resembling Strep  Positive results previously called 11/13/2022  14:48  STREPTOCOCCUS AGALACTIAE (GROUP B)  Beta-hemolytic streptococci are routinely susceptible to   penicillins,cephalosporins and carbapenems.  For susceptibility see order #[id number]  *  STAPHYLOCOCCUS AUREUS  ID consult required at [facility] and [facility] locations.For   susceptibility see order #[id number]  * No Growth to date  No Growth to date  No Growth to date  No Growth to date     BMP:   Recent Labs   Lab 11/15/22  0534   *   *   K 4.5   CL 92*   CO2 25   BUN 23*   CREATININE 1.3   CALCIUM 8.7     CBC:   Recent Labs   Lab 11/15/22  0637   WBC 9.85   HGB 8.8*   HCT 26.3*        CMP:   Recent Labs   Lab 11/15/22  0534   *   K 4.5   CL 92*   CO2 25   *   BUN 23*   CREATININE 1.3   CALCIUM 8.7   ANIONGAP 11       Significant Imaging: I have reviewed all pertinent imaging results/findings within the past 24 hours.

## 2022-11-16 NOTE — PROGRESS NOTES
[facility] Medicine  Progress Note    Patient Name: Joseph Paris Jr.  MRN: [id number]  Patient Class: IP- Inpatient   Admission Date: 11/12/2022  Length of Stay: 3 days  Attending Physician: Jas Killian MD  Primary Care Provider: Thong Hidalgo Jr, MD        Subjective:     Principal Problem:Bacteremia due to group B Streptococcus    HPI:  52 y.o. male patient with a PMHx of HTN, DM, and CAD presents to the Emergency Department for evaluation of Bilateral Feet Swelling which onset gradually within the past week. The pt last saw their wound care three weeks ago. The pt lost his health insurance coverage and decided to take care of the wounds himself. The pt self drained pus from his wounds and used a heated water massager on his feet; reports feet look more infected than before. Symptoms are constant and moderate in severity. No mitigating or exacerbating factors reported. Associated sxs include fever and SOB. Patient denies any CP, N/V, weakness, dysuria, and all other sxs at this time      Overview/Hospital Course:  S/p I&D of foot per Podiatry, recommended MRI and angio with possible referral to vascular surgery. Concerns for viability of flap due to appearance of wounds. Will likely need to return to the OR later this week. Wound cultures collected during procedure, Blood culture: gram positive cocci in chains in both bottles, adjusted antibiotic regimen, added vancomycin. On 11/15- Blood cultures ultimately grew MDR group B strep, but sensitive to both Rocephin and Vancomycin. Staphylococcus speciated as well in blood, but sensitivities still pending. Angiogram ordered by Vascular surgery tentatively scheduled to be done on 11/15. Cannulization in R femoral artery successful. Patient reports notable improvement in his limb swelling and pain immediately after procedure. Further debridement tentatively scheduled for 11/17.       Interval History: Re-cannulization of R femoral a.  successful. No acute events overnight. Doing well this AM with no complaints at our encounter. Denies CP, SOB, Abdominal pain, fevers/chills.  Plans to take to OR for debridement and wound vac placement    Objective:     Vital Signs (Most Recent):  Temp: 98.6 °F (37 °C) (11/16/22 1555)  Pulse: 83 (11/16/22 1555)  Resp: 17 (11/16/22 1555)  BP: 139/74 (11/16/22 1555)  SpO2: 97 % (11/16/22 1555) Vital Signs (24h Range):  Temp:  [97.9 °F (36.6 °C)-98.6 °F (37 °C)] 98.6 °F (37 °C)  Pulse:  [73-98] 83  Resp:  [14-20] 17  SpO2:  [93 %-99 %] 97 %  BP: (101-155)/(53-77) 139/74     Weight: 112 kg (246 lb 14.6 oz)  Body mass index is 30.86 kg/m².    Intake/Output Summary (Last 24 hours) at 11/16/2022 1728  Last data filed at 11/16/2022 1338  Gross per 24 hour   Intake 660 ml   Output --   Net 660 ml      Physical Exam  Largely unchanged  GEN: No acute distress, pleasant, body habitus normal  HEENT: atraumatic and normocephalic  CARDS: regular rate and rhythm, no m/g, pulses are NOT palpable in bilateral LE  PULM: breathing comfortably on room air, chest symmetric, nonlabored, no abnormal breath sounds on auscultation  SKIN: left foot bandaged, dressing is c/d/I- R foot bandaged, but visualized during bandage change, betadine covers wound, still no active drainage on inspection  ABD: nontender, nondistended, soft, no organomegaly, BS+  Neuro: Alert and oriented x3, CN's I-IX grossly intact, sensation and motor intact; follows directions and answers questions appropriately    Significant Labs: All pertinent labs within the past 24 hours have been reviewed.  Blood Culture: No results for input(s): LABBLOO in the last 48 hours.  BMP:   Recent Labs   Lab 11/15/22  0534 11/16/22  0937   *  --    *  --    K 4.5  --    CL 92*  --    CO2 25  --    BUN 23*  --    CREATININE 1.3 1.3   CALCIUM 8.7  --      CBC:   Recent Labs   Lab 11/15/22  0637   WBC 9.85   HGB 8.8*   HCT 26.3*        Cardiac Markers: No results for  input(s): CKMB, MYOGLOBIN, BNP, TROPISTAT in the last 48 hours.  Lipid Panel: No results for input(s): CHOL, HDL, LDLCALC, TRIG, CHOLHDL in the last 48 hours.  TSH: No results for input(s): TSH in the last 4320 hours.  Urine Culture: No results for input(s): LABURIN in the last 48 hours.  Urine Studies: No results for input(s): COLORU, APPEARANCEUA, PHUR, SPECGRAV, PROTEINUA, GLUCUA, KETONESU, BILIRUBINUA, OCCULTUA, NITRITE, UROBILINOGEN, LEUKOCYTESUR, RBCUA, WBCUA, BACTERIA, SQUAMEPITHEL, HYALINECASTS in the last 48 hours.    Invalid input(s): WRIGHTSUR    Significant Imaging: I have reviewed all pertinent imaging results/findings within the past 24 hours.  Imaging Results               X-Ray Foot Complete Left (Final result)  Result time 11/12/22 19:55:06      Final result by Renzo Christopher MD (11/12/22 19:55:06)                   Impression:      Soft tissue emphysema and irregular margin of the distal aspect of the 1st metatarsal strongly concerning for acute osteomyelitis.    This report was flagged in Epic as abnormal.      Electronically signed by: Renzo Christopher  Date:    11/12/2022  Time:    19:55               Narrative:    EXAMINATION:  XR FOOT COMPLETE 3 VIEW LEFT    CLINICAL HISTORY:  .  Local infection of the skin and subcutaneous tissue, unspecified    TECHNIQUE:  AP, lateral and oblique views of the left foot were performed.    COMPARISON:  None    FINDINGS:  Soft tissue emphysema over the 1st digit and dorsum of the foot strongly concerning for infection.  Irregular margin of the 1st metatarsal concerning for osteomyelitis.  Correlation with MRI with contrast would be advised if clinically feasible and the patient is compatible.                                          Assessment/Plan:      * Bacteremia due to group B Streptococcus  Bacteremia due to MRSA  --both BCx show sensitivities to IV Vancomycin- Cefepime discontinued at this time  --repeat BCx obtained on 11/14 no growth to date- continue  to follow   --ID following, will f/u for definitive IV abx recs    Acute osteomyelitis of metatarsal bone of left foot  -S/p I&D on 11/13  -BC (11/13) grew group B strep- repeat cultures (11/14) no growth to date- abx de-escalated to Vancomycin only  -MRI of L FOOT: Large ulceration extending along 1st metatarsal shaft to 1st proximal phalanx. Abnormal bone marrow signal enhancement compatible with osteomyelitis.   -Podiatry rec angiogram of left leg- vascular surgery consulted  -Angiogram of bilateral LE's on 11/15-successful recannulization of R femoral  -plan for debridement and wound vac placement on 11/16    Diabetes mellitus, uncontrolled  --last A1c on file: 10.8%  --Home medications include trulicity, glipizide, metformin  --holding home PO medications  --glucoses while hospitalized have been uncontrolled  --will increase basal to 20 units QHS  --SSI, prandial insulin therapy-aspart 5 TIDWM  --Accuchecks ACQHS  --Fasting AM glucose goal <140    TIERA (acute kidney injury)  S/p IV fluids  Trend Cr- improving- resolved  Continue to monitor with daily labs    Severe sepsis  resolved  -Leukocytosis resolved, patient afebrile  -see plan for osteo      VTE Risk Mitigation (From admission, onward)         Ordered     enoxaparin injection 40 mg  Daily         11/12/22 2232     IP VTE HIGH RISK PATIENT  Once         11/12/22 2232     Place sequential compression device  Until discontinued         11/12/22 2232                Discharge Planning   KIT:      Code Status: Full Code   Is the patient medically ready for discharge?:     Reason for patient still in hospital (select all that apply): Treatment, Consult recommendations and Pending disposition  Discharge Plan A: Home                  Jas Killian MD  Department of Hospital Medicine   [facility] - Med Surg

## 2022-11-16 NOTE — PROGRESS NOTES
"Pharmacokinetic Assessment Follow Up: IV Vancomycin    Vancomycin serum concentration assessment(s):    The trough level was drawn correctly and can be used to guide therapy at this time. The measurement is within the desired definitive target range of 15 to 20 mcg/mL.    Vancomycin Regimen Plan:    Change regimen to Vancomycin 1250 mg IV every 12 hours with next serum trough concentration measured at 2215 prior to 4th dose on 11/17    Drug levels (last 3 results):  Recent Labs   Lab Result Units 11/14/22  0011 11/14/22  1425 11/15/22  0534 11/16/22  0937   Vancomycin, Random ug/mL 15.3 9.8 13.9  --    Vancomycin-Trough ug/mL  --   --   --  18.6       Pharmacy will continue to follow and monitor vancomycin.    Please contact pharmacy at extension [phone] for questions regarding this assessment.    Thank you for the consult,   Cesia Randall       Patient brief summary:  Joseph Paris Jr. is a 52 y.o. male initiated on antimicrobial therapy with IV Vancomycin for treatment of bone/joint infection    The patient's current regimen is 1250mg every 12 hours     Drug Allergies:   Review of patient's allergies indicates:   Allergen Reactions    Lisinopril Swelling    Penicillins Other (See Comments)     Pt unsure of reaction, stating "I just know I'm allergic"       Actual Body Weight:   112kg    Renal Function:   Estimated Creatinine Clearance: 87.7 mL/min (based on SCr of 1.3 mg/dL).,     Dialysis Method (if applicable):  N/A    CBC (last 72 hours):  Recent Labs   Lab Result Units 11/14/22  0536 11/15/22  0637   WBC K/uL 16.14* 9.85   Hemoglobin g/dL 9.4* 8.8*   Hematocrit % 27.9* 26.3*   Platelets K/uL 406 402   Gran % % 76.7* 62.4   Lymph % % 11.9* 20.9   Mono % % 9.0 11.8   Eosinophil % % 1.2 3.0   Basophil % % 0.3 0.5   Differential Method  Automated Automated       Metabolic Panel (last 72 hours):  Recent Labs   Lab Result Units 11/14/22  0536 11/15/22  0534 11/16/22  0937   Sodium mmol/L 130* 128*  --  "   Potassium mmol/L 3.7 4.5  --    Chloride mmol/L 91* 92*  --    CO2 mmol/L 28 25  --    Glucose mg/dL 263* 264*  --    BUN mg/dL 26* 23*  --    Creatinine mg/dL 1.4 1.3 1.3       Vancomycin Administrations:  vancomycin given in the last 96 hours                     vancomycin 1.25 g in dextrose 5% 250 mL IVPB (ready to mix) (mg) 1,250 mg New Bag 11/16/22 1120    vancomycin 1.5 g in dextrose 5 % 250 mL IVPB (ready to mix) (mg) 1,500 mg New Bag 11/15/22 2142     1,500 mg New Bag  1038    vancomycin 1.5 g in dextrose 5 % 250 mL IVPB (ready to mix) ()  Restarted 11/14/22 1824     1,500 mg New Bag  1729    vancomycin 1.25 g in dextrose 5% 250 mL IVPB (ready to mix) (mg) 1,250 mg New Bag 11/14/22 0241    vancomycin 1.5 g in dextrose 5 % 250 mL IVPB (ready to mix) (mg) 1,500 mg New Bag 11/13/22 1223    vancomycin injection (g) 1 g Given 11/13/22 1117    vancomycin 2 g in dextrose 5 % 500 mL IVPB (mg) 2,000 mg New Bag 11/12/22 2121                    Microbiologic Results:  Microbiology Results (last 7 days)       Procedure Component Value Units Date/Time    Blood Culture #2 **CANNOT BE ORDERED STAT** [[id number]]  (Abnormal) Collected: 11/12/22 2021    Order Status: Completed Specimen: Blood from Peripheral, Forearm, Left Updated: 11/16/22 1152     Blood Culture, Routine Gram stain silva bottle: Gram positive cocci in chains resembling Strep      Results called to and read back by:Delores Zhou RN 11/13/2022  13:16      Gram stain aer bottle: Gram positive cocci in chains resembling Strep      Positive results previously called 11/13/2022  14:48      STREPTOCOCCUS AGALACTIAE (GROUP B)  Beta-hemolytic streptococci are routinely susceptible to   penicillins,cephalosporins and carbapenems.  For susceptibility see order #[id number]        METHICILLIN RESISTANT STAPHYLOCOCCUS AUREUS  ID consult required at [facility],[place] and [facility].For   susceptibility see order #[id number]      Blood Culture #1 **CANNOT BE ORDERED  STAT** [[id number]]  (Abnormal)  (Susceptibility) Collected: 11/12/22 1941    Order Status: Completed Specimen: Blood from Peripheral, Antecubital, Left Updated: 11/16/22 1127     Blood Culture, Routine Gram stain silva bottle: Gram positive cocci in chains resembling Strep      Results called to and read back by:Delores Zhou RN 11/13/2022  13:16      Gram stain aer bottle: Gram positive cocci in chains resembling Strep      Positive results previously called 11/13/2022  14:47      STREPTOCOCCUS AGALACTIAE (GROUP B)  Beta-hemolytic streptococci are routinely susceptible to   penicillins,cephalosporins and carbapenems.        METHICILLIN RESISTANT STAPHYLOCOCCUS AUREUS  ID consult required at [facility] and [facility].  ID consult required at [facility] and [facility].      Culture, Anaerobe [[id number]] Collected: 11/13/22 1148    Order Status: Completed Specimen: Abscess from Foot, Left Updated: 11/16/22 0927     Anaerobic Culture Culture in progress    Narrative:      Left Foot Abscess    Blood culture [[id number]] Collected: 11/14/22 0536    Order Status: Completed Specimen: Blood Updated: 11/15/22 2012     Blood Culture, Routine No Growth to date      No Growth to date    Blood culture [[id number]] Collected: 11/14/22 0536    Order Status: Completed Specimen: Blood Updated: 11/15/22 2012     Blood Culture, Routine No Growth to date      No Growth to date    Aerobic culture [[id number]]  (Abnormal) Collected: 11/13/22 1148    Order Status: Completed Specimen: Abscess from Foot, Left Updated: 11/15/22 1424     Aerobic Bacterial Culture STREPTOCOCCUS AGALACTIAE (GROUP B)  Many  Beta-hemolytic streptococci are routinely susceptible to   penicillins,cephalosporins and carbapenems.      Narrative:      Left Foot Abscess    AFB Culture & Smear [[id number]] Collected: 11/13/22 1148    Order Status: Completed Specimen: Abscess from Foot, Left Updated: 11/14/22 2127     AFB Culture & Smear Culture in  progress     AFB CULTURE STAIN No acid fast bacilli seen.    Narrative:      Left Foot Abscess    Gram stain [[id number]] Collected: 11/13/22 1148    Order Status: Completed Specimen: Abscess from Foot, Left Updated: 11/13/22 2106     Gram Stain Result Rare WBC's      Rare Gram positive cocci      Rare Gram negative rods    Narrative:      Left Foot Abscess    Fungus culture [[id number]] Collected: 11/13/22 1148    Order Status: Sent Specimen: Abscess from Foot, Left Updated: 11/13/22 1951    Influenza A & B by Molecular [[id number]] Collected: 11/12/22 2027    Order Status: Completed Specimen: Nasopharyngeal Swab Updated: 11/12/22 2102     Influenza A, Molecular Negative     Influenza B, Molecular Negative     Flu A & B Source Nasal swab    Influenza A & B by Molecular [[id number]] Collected: 11/12/22 1933    Order Status: Canceled Specimen: Nasopharyngeal Swab

## 2022-11-16 NOTE — PLAN OF CARE
Will plan to proceed to the operating room in morning for irrigation and debridement with application of wound VAC to the foot.  We did discuss healing given his history of uncontrolled diabetes.  He will continue his current IV antibiotics per Infectious Disease.     NPO at midnight

## 2022-11-16 NOTE — PLAN OF CARE
Pt is stable on room air. Pt ambulated to bathroom and tolerated well. Pt went down for angioplasty today and blood flow was restored to left foot. Pt has had no pain. Continuing IV abx as ordered. Wound care completed. Telemetry in place and intact. Blood sugar monitored. Pt is A&Ox4 call light in reach. Safety precautions in place. Chart check completed. Reviewed active orders.

## 2022-11-17 ENCOUNTER — ANESTHESIA (OUTPATIENT)
Dept: SURGERY | Facility: HOSPITAL | Age: 52
DRG: 853 | End: 2022-11-17
Payer: MEDICAID

## 2022-11-17 PROBLEM — B95.62 BACTEREMIA DUE TO METHICILLIN RESISTANT STAPHYLOCOCCUS AUREUS: Status: ACTIVE | Noted: 2022-11-17

## 2022-11-17 PROBLEM — R78.81 BACTEREMIA DUE TO METHICILLIN RESISTANT STAPHYLOCOCCUS AUREUS: Status: ACTIVE | Noted: 2022-11-17

## 2022-11-17 LAB
ANION GAP SERPL CALC-SCNC: 10 MMOL/L (ref 8–16)
APTT BLDCRRT: 30.1 SEC (ref 21–32)
BASOPHILS # BLD AUTO: 0.05 K/UL (ref 0–0.2)
BASOPHILS NFR BLD: 0.6 % (ref 0–1.9)
BUN SERPL-MCNC: 13 MG/DL (ref 6–20)
CALCIUM SERPL-MCNC: 8.8 MG/DL (ref 8.7–10.5)
CHLORIDE SERPL-SCNC: 94 MMOL/L (ref 95–110)
CO2 SERPL-SCNC: 29 MMOL/L (ref 23–29)
CREAT SERPL-MCNC: 1.4 MG/DL (ref 0.5–1.4)
CREAT SERPL-MCNC: 1.5 MG/DL (ref 0.5–1.4)
DIFFERENTIAL METHOD: ABNORMAL
EOSINOPHIL # BLD AUTO: 0.3 K/UL (ref 0–0.5)
EOSINOPHIL NFR BLD: 3.1 % (ref 0–8)
ERYTHROCYTE [DISTWIDTH] IN BLOOD BY AUTOMATED COUNT: 11.1 % (ref 11.5–14.5)
EST. GFR  (NO RACE VARIABLE): 55 ML/MIN/1.73 M^2
EST. GFR  (NO RACE VARIABLE): 60 ML/MIN/1.73 M^2
GLUCOSE SERPL-MCNC: 194 MG/DL (ref 70–110)
HCT VFR BLD AUTO: 28.5 % (ref 40–54)
HGB BLD-MCNC: 9.4 G/DL (ref 14–18)
IMM GRANULOCYTES # BLD AUTO: 0.31 K/UL (ref 0–0.04)
IMM GRANULOCYTES NFR BLD AUTO: 3.8 % (ref 0–0.5)
INR PPP: 1 (ref 0.8–1.2)
LYMPHOCYTES # BLD AUTO: 2.1 K/UL (ref 1–4.8)
LYMPHOCYTES NFR BLD: 25.5 % (ref 18–48)
MCH RBC QN AUTO: 31.2 PG (ref 27–31)
MCHC RBC AUTO-ENTMCNC: 33 G/DL (ref 32–36)
MCV RBC AUTO: 95 FL (ref 82–98)
MONOCYTES # BLD AUTO: 0.9 K/UL (ref 0.3–1)
MONOCYTES NFR BLD: 10.9 % (ref 4–15)
NEUTROPHILS # BLD AUTO: 4.6 K/UL (ref 1.8–7.7)
NEUTROPHILS NFR BLD: 56.1 % (ref 38–73)
NRBC BLD-RTO: 0 /100 WBC
PLATELET # BLD AUTO: 501 K/UL (ref 150–450)
PMV BLD AUTO: 9.4 FL (ref 9.2–12.9)
POCT GLUCOSE: 199 MG/DL (ref 70–110)
POCT GLUCOSE: 202 MG/DL (ref 70–110)
POCT GLUCOSE: 210 MG/DL (ref 70–110)
POCT GLUCOSE: 258 MG/DL (ref 70–110)
POTASSIUM SERPL-SCNC: 3.8 MMOL/L (ref 3.5–5.1)
PROTHROMBIN TIME: 11.1 SEC (ref 9–12.5)
RBC # BLD AUTO: 3.01 M/UL (ref 4.6–6.2)
SODIUM SERPL-SCNC: 133 MMOL/L (ref 136–145)
VANCOMYCIN TROUGH SERPL-MCNC: 22.3 UG/ML (ref 10–22)
WBC # BLD AUTO: 8.19 K/UL (ref 3.9–12.7)

## 2022-11-17 PROCEDURE — 85025 COMPLETE CBC W/AUTO DIFF WBC: CPT | Performed by: STUDENT IN AN ORGANIZED HEALTH CARE EDUCATION/TRAINING PROGRAM

## 2022-11-17 PROCEDURE — 25000003 PHARM REV CODE 250: Performed by: FAMILY MEDICINE

## 2022-11-17 PROCEDURE — 63600175 PHARM REV CODE 636 W HCPCS: Performed by: PODIATRIST

## 2022-11-17 PROCEDURE — 71000039 HC RECOVERY, EACH ADD'L HOUR: Performed by: PODIATRIST

## 2022-11-17 PROCEDURE — 85610 PROTHROMBIN TIME: CPT | Performed by: STUDENT IN AN ORGANIZED HEALTH CARE EDUCATION/TRAINING PROGRAM

## 2022-11-17 PROCEDURE — 25000003 PHARM REV CODE 250: Performed by: PODIATRIST

## 2022-11-17 PROCEDURE — 27000207 HC ISOLATION

## 2022-11-17 PROCEDURE — 11045 PR DEB SUBQ TISSUE ADD-ON: ICD-10-PCS | Mod: ,,, | Performed by: PODIATRIST

## 2022-11-17 PROCEDURE — 71000033 HC RECOVERY, INTIAL HOUR: Performed by: PODIATRIST

## 2022-11-17 PROCEDURE — 63600175 PHARM REV CODE 636 W HCPCS: Performed by: FAMILY MEDICINE

## 2022-11-17 PROCEDURE — 80048 BASIC METABOLIC PNL TOTAL CA: CPT | Performed by: STUDENT IN AN ORGANIZED HEALTH CARE EDUCATION/TRAINING PROGRAM

## 2022-11-17 PROCEDURE — 80202 ASSAY OF VANCOMYCIN: CPT | Performed by: PODIATRIST

## 2022-11-17 PROCEDURE — 11045 DBRDMT SUBQ TISS EACH ADDL: CPT | Mod: ,,, | Performed by: PODIATRIST

## 2022-11-17 PROCEDURE — 37000008 HC ANESTHESIA 1ST 15 MINUTES: Performed by: PODIATRIST

## 2022-11-17 PROCEDURE — 37000009 HC ANESTHESIA EA ADD 15 MINS: Performed by: PODIATRIST

## 2022-11-17 PROCEDURE — 25000003 PHARM REV CODE 250: Performed by: STUDENT IN AN ORGANIZED HEALTH CARE EDUCATION/TRAINING PROGRAM

## 2022-11-17 PROCEDURE — 97116 GAIT TRAINING THERAPY: CPT

## 2022-11-17 PROCEDURE — 97605 PR NEG PRESS WOUND THERAPY (NPWT) W/NON-DISPOSABLE WOUND VAC DEVICE (DME), <=50 CM: ICD-10-PCS | Mod: 59,,, | Performed by: PODIATRIST

## 2022-11-17 PROCEDURE — 82565 ASSAY OF CREATININE: CPT | Performed by: PODIATRIST

## 2022-11-17 PROCEDURE — 36000705 HC OR TIME LEV I EA ADD 15 MIN: Performed by: PODIATRIST

## 2022-11-17 PROCEDURE — 36000704 HC OR TIME LEV I 1ST 15 MIN: Performed by: PODIATRIST

## 2022-11-17 PROCEDURE — 99233 SBSQ HOSP IP/OBS HIGH 50: CPT | Mod: NSCH,,, | Performed by: INTERNAL MEDICINE

## 2022-11-17 PROCEDURE — 21400001 HC TELEMETRY ROOM

## 2022-11-17 PROCEDURE — 97605 NEG PRS WND THER DME<=50SQCM: CPT | Mod: 59,,, | Performed by: PODIATRIST

## 2022-11-17 PROCEDURE — 85730 THROMBOPLASTIN TIME PARTIAL: CPT | Performed by: STUDENT IN AN ORGANIZED HEALTH CARE EDUCATION/TRAINING PROGRAM

## 2022-11-17 PROCEDURE — 27201423 OPTIME MED/SURG SUP & DEVICES STERILE SUPPLY: Performed by: PODIATRIST

## 2022-11-17 PROCEDURE — 11042 PR DEBRIDEMENT, SKIN, SUB-Q TISSUE,=<20 SQ CM: ICD-10-PCS | Mod: ,,, | Performed by: PODIATRIST

## 2022-11-17 PROCEDURE — 97162 PT EVAL MOD COMPLEX 30 MIN: CPT

## 2022-11-17 PROCEDURE — 99233 PR SUBSEQUENT HOSPITAL CARE,LEVL III: ICD-10-PCS | Mod: NSCH,,, | Performed by: INTERNAL MEDICINE

## 2022-11-17 PROCEDURE — 11042 DBRDMT SUBQ TIS 1ST 20SQCM/<: CPT | Mod: ,,, | Performed by: PODIATRIST

## 2022-11-17 RX ORDER — MUPIROCIN 20 MG/G
OINTMENT TOPICAL 2 TIMES DAILY
Status: CANCELLED | OUTPATIENT
Start: 2022-11-17 | End: 2022-11-22

## 2022-11-17 RX ORDER — MEPERIDINE HYDROCHLORIDE 25 MG/ML
12.5 INJECTION INTRAMUSCULAR; INTRAVENOUS; SUBCUTANEOUS ONCE
Status: DISCONTINUED | OUTPATIENT
Start: 2022-11-17 | End: 2022-11-17

## 2022-11-17 RX ORDER — SODIUM CHLORIDE 0.9 % (FLUSH) 0.9 %
3 SYRINGE (ML) INJECTION
Status: DISCONTINUED | OUTPATIENT
Start: 2022-11-17 | End: 2022-11-29 | Stop reason: HOSPADM

## 2022-11-17 RX ORDER — MIDAZOLAM HYDROCHLORIDE 1 MG/ML
INJECTION, SOLUTION INTRAMUSCULAR; INTRAVENOUS
Status: DISCONTINUED | OUTPATIENT
Start: 2022-11-17 | End: 2022-11-17

## 2022-11-17 RX ORDER — ALBUTEROL SULFATE 0.83 MG/ML
2.5 SOLUTION RESPIRATORY (INHALATION) EVERY 4 HOURS PRN
Status: DISCONTINUED | OUTPATIENT
Start: 2022-11-17 | End: 2022-11-17

## 2022-11-17 RX ORDER — PROPOFOL 10 MG/ML
VIAL (ML) INTRAVENOUS
Status: DISCONTINUED | OUTPATIENT
Start: 2022-11-17 | End: 2022-11-17

## 2022-11-17 RX ORDER — ONDANSETRON 2 MG/ML
4 INJECTION INTRAMUSCULAR; INTRAVENOUS DAILY PRN
Status: DISCONTINUED | OUTPATIENT
Start: 2022-11-17 | End: 2022-11-17

## 2022-11-17 RX ORDER — OXYCODONE HYDROCHLORIDE 5 MG/1
5 TABLET ORAL
Status: DISCONTINUED | OUTPATIENT
Start: 2022-11-17 | End: 2022-11-17

## 2022-11-17 RX ORDER — PROCHLORPERAZINE EDISYLATE 5 MG/ML
5 INJECTION INTRAMUSCULAR; INTRAVENOUS EVERY 30 MIN PRN
Status: DISCONTINUED | OUTPATIENT
Start: 2022-11-17 | End: 2022-11-17

## 2022-11-17 RX ORDER — MUPIROCIN 20 MG/G
OINTMENT TOPICAL 2 TIMES DAILY
Status: COMPLETED | OUTPATIENT
Start: 2022-11-17 | End: 2022-11-21

## 2022-11-17 RX ORDER — DIPHENHYDRAMINE HYDROCHLORIDE 50 MG/ML
25 INJECTION INTRAMUSCULAR; INTRAVENOUS EVERY 6 HOURS PRN
Status: DISCONTINUED | OUTPATIENT
Start: 2022-11-17 | End: 2022-11-17

## 2022-11-17 RX ORDER — HYDROMORPHONE HYDROCHLORIDE 2 MG/ML
0.2 INJECTION, SOLUTION INTRAMUSCULAR; INTRAVENOUS; SUBCUTANEOUS EVERY 5 MIN PRN
Status: DISCONTINUED | OUTPATIENT
Start: 2022-11-17 | End: 2022-11-17

## 2022-11-17 RX ORDER — LIDOCAINE HYDROCHLORIDE 20 MG/ML
INJECTION, SOLUTION EPIDURAL; INFILTRATION; INTRACAUDAL; PERINEURAL
Status: DISCONTINUED | OUTPATIENT
Start: 2022-11-17 | End: 2022-11-17

## 2022-11-17 RX ORDER — FENTANYL CITRATE 50 UG/ML
INJECTION, SOLUTION INTRAMUSCULAR; INTRAVENOUS
Status: DISCONTINUED | OUTPATIENT
Start: 2022-11-17 | End: 2022-11-17

## 2022-11-17 RX ORDER — KETOROLAC TROMETHAMINE 30 MG/ML
15 INJECTION, SOLUTION INTRAMUSCULAR; INTRAVENOUS EVERY 8 HOURS PRN
Status: DISCONTINUED | OUTPATIENT
Start: 2022-11-17 | End: 2022-11-17

## 2022-11-17 RX ORDER — BUPIVACAINE HYDROCHLORIDE 5 MG/ML
INJECTION, SOLUTION EPIDURAL; INTRACAUDAL
Status: DISCONTINUED | OUTPATIENT
Start: 2022-11-17 | End: 2022-11-17 | Stop reason: HOSPADM

## 2022-11-17 RX ADMIN — MUPIROCIN: 20 OINTMENT TOPICAL at 09:11

## 2022-11-17 RX ADMIN — ENOXAPARIN SODIUM 40 MG: 40 INJECTION SUBCUTANEOUS at 05:11

## 2022-11-17 RX ADMIN — Medication 1 TABLET: at 05:11

## 2022-11-17 RX ADMIN — MIDAZOLAM 2 MG: 1 INJECTION INTRAMUSCULAR; INTRAVENOUS at 07:11

## 2022-11-17 RX ADMIN — INSULIN ASPART 6 UNITS: 100 INJECTION, SOLUTION INTRAVENOUS; SUBCUTANEOUS at 09:11

## 2022-11-17 RX ADMIN — PROPOFOL 20 MG: 10 INJECTION, EMULSION INTRAVENOUS at 07:11

## 2022-11-17 RX ADMIN — INSULIN ASPART 5 UNITS: 100 INJECTION, SOLUTION INTRAVENOUS; SUBCUTANEOUS at 12:11

## 2022-11-17 RX ADMIN — INSULIN ASPART 5 UNITS: 100 INJECTION, SOLUTION INTRAVENOUS; SUBCUTANEOUS at 05:11

## 2022-11-17 RX ADMIN — SODIUM CHLORIDE, SODIUM LACTATE, POTASSIUM CHLORIDE, AND CALCIUM CHLORIDE: .6; .31; .03; .02 INJECTION, SOLUTION INTRAVENOUS at 07:11

## 2022-11-17 RX ADMIN — INSULIN ASPART 4 UNITS: 100 INJECTION, SOLUTION INTRAVENOUS; SUBCUTANEOUS at 12:11

## 2022-11-17 RX ADMIN — INSULIN ASPART 4 UNITS: 100 INJECTION, SOLUTION INTRAVENOUS; SUBCUTANEOUS at 05:11

## 2022-11-17 RX ADMIN — FENTANYL CITRATE 100 MCG: 50 INJECTION, SOLUTION INTRAMUSCULAR; INTRAVENOUS at 07:11

## 2022-11-17 RX ADMIN — INSULIN ASPART 5 UNITS: 100 INJECTION, SOLUTION INTRAVENOUS; SUBCUTANEOUS at 09:11

## 2022-11-17 RX ADMIN — INSULIN ASPART 2 UNITS: 100 INJECTION, SOLUTION INTRAVENOUS; SUBCUTANEOUS at 05:11

## 2022-11-17 RX ADMIN — INSULIN DETEMIR 5 UNITS: 100 INJECTION, SOLUTION SUBCUTANEOUS at 09:11

## 2022-11-17 RX ADMIN — Medication 1 TABLET: at 12:11

## 2022-11-17 RX ADMIN — LIDOCAINE HYDROCHLORIDE 50 MG: 20 INJECTION, SOLUTION EPIDURAL; INFILTRATION; INTRACAUDAL; PERINEURAL at 07:11

## 2022-11-17 RX ADMIN — VANCOMYCIN HYDROCHLORIDE 1250 MG: 1.25 INJECTION, POWDER, LYOPHILIZED, FOR SOLUTION INTRAVENOUS at 11:11

## 2022-11-17 RX ADMIN — INSULIN DETEMIR 20 UNITS: 100 INJECTION, SOLUTION SUBCUTANEOUS at 09:11

## 2022-11-17 RX ADMIN — Medication 1 TABLET: at 09:11

## 2022-11-17 RX ADMIN — AMLODIPINE BESYLATE 5 MG: 5 TABLET ORAL at 09:11

## 2022-11-17 RX ADMIN — ACETAMINOPHEN 650 MG: 325 TABLET ORAL at 07:11

## 2022-11-17 NOTE — INTERVAL H&P NOTE
The patient has been examined and the H&P has been reviewed:    I concur with the findings and changes have been noted since the H&P was written: Irrigation and debridement of wound with application of wound vac    Surgery risks, benefits and alternative options discussed and understood by patient/family.          Active Hospital Problems    Diagnosis  POA    *Bacteremia due to group B Streptococcus [R78.81, B95.1]  Yes    Decubitus ulcer of right heel [L89.619]  Yes    Gas gangrene of foot [A48.0]  Yes    Acute osteomyelitis of metatarsal bone of left foot [M86.172]  Yes    Severe sepsis [A41.9, R65.20]  Yes    TIERA (acute kidney injury) [N17.9]  Yes    Diabetes mellitus [E11.9]  Yes      Resolved Hospital Problems    Diagnosis Date Resolved POA    Abscess of foot including toes, left [L02.612] 11/13/2022 Yes

## 2022-11-17 NOTE — BRIEF OP NOTE
[facility] Surg  Brief Operative Note    SUMMARY     Surgery Date: 11/17/2022     Surgeon(s) and Role:     * Sierra Augustine DPM - Primary    Assisting Surgeon: None    Pre-op Diagnosis:  Left foot infection [L08.9]  Gas gangrene of foot [A48.0]    Post-op Diagnosis:  Post-Op Diagnosis Codes:     * Left foot infection [L08.9]     * Gas gangrene of foot [A48.0]    Procedure(s) (LRB):  INCISION AND DRAINAGE, FOOT (Left)  APPLICATION, WOUND VAC (Left)    Anesthesia: Local MAC    Operative Findings:  Residual nonviable tissue which was noted on the lateral aspect of the incision site and lateral aspect of the great toe.  The medial MPJ necrotic tissue was also visualized.  This was excised to healthy bleeding tissue.  No evidence of abscess.  Extensor hallucis tendon easily visualized    Estimated Blood Loss: * No values recorded between 11/17/2022  7:28 AM and 11/17/2022  8:07 AM *    Estimated Blood Loss has not been documented. EBL = 10.         Specimens:   Specimen (24h ago, onward)      None            [id number]

## 2022-11-17 NOTE — ASSESSMENT & PLAN NOTE
--glucoses more controlled this AM  --continue basal insulin at 20 units QHS, 5u QAM  --SSI, prandial insulin therapy-aspart 5 TIDWM  --Accuchecks [facility]  --Fasting AM glucose goal <140

## 2022-11-17 NOTE — ANESTHESIA POSTPROCEDURE EVALUATION
Anesthesia Post Evaluation    Patient: Joseph Paris Jr.    Procedure(s) Performed: Procedure(s) (LRB):  INCISION AND DRAINAGE, FOOT (Left)  APPLICATION, WOUND VAC (Left)    Final Anesthesia Type: MAC      Patient location during evaluation: PACU  Patient participation: Yes- Able to Participate  Level of consciousness: awake and alert  Post-procedure vital signs: reviewed and stable  Pain management: adequate  Airway patency: patent    PONV status at discharge: No PONV  Anesthetic complications: no      Cardiovascular status: stable  Respiratory status: unassisted and spontaneous ventilation  Hydration status: euvolemic  Follow-up not needed.          Vitals Value Taken Time   /80 11/17/22 0806   Temp 36.8 °C (98.3 °F) 11/17/22 0423   Pulse 74 11/17/22 0806   Resp 20 11/17/22 0806   SpO2 84 % 11/17/22 0806   Vitals shown include unvalidated device data.      No case tracking events are documented in the log.      Pain/Zandra Score: Pain Rating Prior to Med Admin: 6 (11/16/2022  5:22 AM)  Pain Rating Post Med Admin: 4 (11/16/2022  6:22 AM)

## 2022-11-17 NOTE — PLAN OF CARE
Patient is in stable condition, no acute distress, remained free from injuries, receiving IV antibiotics, no pain reported this shift, dressing to BLE CDI, VSS and will continue to monitor. 24 hr chart check performed.

## 2022-11-17 NOTE — PLAN OF CARE
Patient currently status post incision drainage of left foot secondary to necrotizing fasciitis/gas gangrene.  He is also status post irrigation debridement of nonviable soft tissue with application of wound VAC.  The wound VAC is on functioning at 120 mmHg continuous suction.      Wound [facility]  Please have wound care performed VAC changes every 2-3 days.      Patient will likely need home health upon discharge.    [facility] and does wish to continue to follow-up with them for his wound VAC management. Please send referral for continued management.    Will have PT assess and evaluate patient as he will need to be nonweightbearing on the left lower extremity due to wound VAC.    Patient is okay to DC at the recommendation of Hospital Medicine if antibiotic management is arranged in other teams are okay with discharge.

## 2022-11-17 NOTE — ASSESSMENT & PLAN NOTE
-S/p I&D on 11/13  -BC (11/13) grew group B strep- repeat cultures (11/14) no growth to date- abx de-escalated to Vancomycin only  -MRI of L FOOT: Large ulceration extending along 1st metatarsal shaft to 1st proximal phalanx. Abnormal bone marrow signal enhancement compatible with osteomyelitis.   -Podiatry rec angiogram of left leg- vascular surgery consulted  -Angiogram of bilateral LE's on 11/15-successful recannulization of R femoral  -plan for debridement and wound vac placement this morning (11/16)

## 2022-11-17 NOTE — TRANSFER OF CARE
"Anesthesia Transfer of Care Note    Patient: Joseph Paris Jr.    Procedure(s) Performed: Procedure(s) (LRB):  INCISION AND DRAINAGE, FOOT (Left)  APPLICATION, WOUND VAC (Left)    Patient location: PACU    Anesthesia Type: MAC    Transport from OR: Transported from OR on room air with adequate spontaneous ventilation    Post pain: adequate analgesia    Post assessment: no apparent anesthetic complications    Post vital signs: stable    Level of consciousness: awake and responds to stimulation    Nausea/Vomiting: no nausea/vomiting    Complications: none    Transfer of care protocol was followed      Last vitals:   Visit Vitals  BP (!) 155/80 (BP Location: Right arm, Patient Position: Lying)   Pulse 76   Temp 36.8 °C (98.3 °F) (Oral)   Resp 18   Ht 6' 3" (1.905 m)   Wt 112 kg (246 lb 14.6 oz)   SpO2 98%   BMI 30.86 kg/m²     "

## 2022-11-17 NOTE — ASSESSMENT & PLAN NOTE
--both BCx show sensitivities to IV Vancomycin- Cefepime discontinued at this time  --repeat BCx obtained on 11/14 no growth to date- continue to follow   --ID following, will f/u for definitive IV abx recs

## 2022-11-17 NOTE — PROGRESS NOTES
[facility] Medicine  Progress Note    Patient Name: Joseph Paris Jr.  MRN: [id number]  Patient Class: IP- Inpatient   Admission Date: 11/12/2022  Length of Stay: 4 days  Attending Physician: Jas Killian MD  Primary Care Provider: Thong Hidalgo Jr, MD        Subjective:     Principal Problem:Bacteremia due to group B Streptococcus        HPI:  52 y.o. male patient with a PMHx of HTN, DM, and CAD presents to the Emergency Department for evaluation of Bilateral Feet Swelling which onset gradually within the past week. The pt last saw their wound care three weeks ago. The pt lost his health insurance coverage and decided to take care of the wounds himself. The pt self drained pus from his wounds and used a heated water massager on his feet; reports feet look more infected than before. Symptoms are constant and moderate in severity. No mitigating or exacerbating factors reported. Associated sxs include fever and SOB. Patient denies any CP, N/V, weakness, dysuria, and all other sxs at this time      Overview/Hospital Course:  S/p I&D of foot per Podiatry, recommended MRI and angio with possible referral to vascular surgery. Concerns for viability of flap due to appearance of wounds. Will likely need to return to the OR later this week. Wound cultures collected during procedure, Blood culture: gram positive cocci in chains in both bottles, adjusted antibiotic regimen, added vancomycin. On 11/15- Blood cultures ultimately grew MDR group B strep, but sensitive to both Rocephin and Vancomycin. Staphylococcus speciated as well in blood, but sensitivities still pending. Angiogram ordered by Vascular surgery tentatively scheduled to be done on 11/15. Cannulization in R femoral artery successful. Patient reports notable improvement in his limb swelling and pain immediately after procedure. Further debridement tentatively scheduled for 11/17.       Interval History: No acute events overnight. Doing well  this AM with no complaints at our encounter. Denies CP, SOB, Abdominal pain, fevers/chills. Evaluated preoperatively.    Review of Systems  Objective:     Vital Signs (Most Recent):  Temp: 98.3 °F (36.8 °C) (11/17/22 0423)  Pulse: 76 (11/17/22 0423)  Resp: 18 (11/17/22 0423)  BP: (!) 155/80 (11/17/22 0423)  SpO2: 98 % (11/17/22 0423)   Vital Signs (24h Range):  Temp:  [97.9 °F (36.6 °C)-98.8 °F (37.1 °C)] 98.3 °F (36.8 °C)  Pulse:  [73-84] 76  Resp:  [17-18] 18  SpO2:  [93 %-98 %] 98 %  BP: (123-155)/(62-80) 155/80     Weight: 112 kg (246 lb 14.6 oz)  Body mass index is 30.86 kg/m².    Intake/Output Summary (Last 24 hours) at 11/17/2022 0800  Last data filed at 11/16/2022 1817  Gross per 24 hour   Intake 600 ml   Output --   Net 600 ml      Physical Exam  Largely unchanged  GEN: No acute distress, pleasant, body habitus normal  CARDS: regular rate and rhythm, no m/g, pulses are NOT palpable in bilateral LE  PULM: breathing comfortably on room air, chest symmetric, nonlabored, no abnormal breath sounds on auscultation  SKIN: left foot bandaged, dressing is c/d/I- R foot bandaged, but visualized during bandage change, betadine covers wound, still no active drainage on inspection  ABD: nontender, nondistended, soft, no organomegaly, BS+  Neuro: Alert and oriented x3, CN's I-IX grossly intact, sensation and motor intact; follows directions and answers questions appropriately      Significant Labs: All pertinent labs within the past 24 hours have been reviewed.  Bilirubin:   Recent Labs   Lab 11/12/22 1937   BILITOT 1.9*     Blood Culture: No results for input(s): LABBLOO in the last 48 hours.  BMP:   Recent Labs   Lab 11/17/22  0555   *   *   K 3.8   CL 94*   CO2 29   BUN 13   CREATININE 1.4   CALCIUM 8.8     CBC:   Recent Labs   Lab 11/17/22  0555   WBC 8.19   HGB 9.4*   HCT 28.5*   *     CMP:   Recent Labs   Lab 11/16/22  0937 11/17/22  0555   NA  --  133*   K  --  3.8   CL  --  94*   CO2  --  29    GLU  --  194*   BUN  --  13   CREATININE 1.3 1.4   CALCIUM  --  8.8   ANIONGAP  --  10     Urine Culture: No results for input(s): LABURIN in the last 48 hours.    Significant Imaging: I have reviewed all pertinent imaging results/findings within the past 24 hours.    Imaging Results               X-Ray Foot Complete Left (Final result)  Result time 11/12/22 19:55:06      Final result by Renzo Christopher MD (11/12/22 19:55:06)                   Impression:      Soft tissue emphysema and irregular margin of the distal aspect of the 1st metatarsal strongly concerning for acute osteomyelitis.    This report was flagged in Epic as abnormal.      Electronically signed by: Renzo Christopher  Date:    11/12/2022  Time:    19:55               Narrative:    EXAMINATION:  XR FOOT COMPLETE 3 VIEW LEFT    CLINICAL HISTORY:  .  Local infection of the skin and subcutaneous tissue, unspecified    TECHNIQUE:  AP, lateral and oblique views of the left foot were performed.    COMPARISON:  None    FINDINGS:  Soft tissue emphysema over the 1st digit and dorsum of the foot strongly concerning for infection.  Irregular margin of the 1st metatarsal concerning for osteomyelitis.  Correlation with MRI with contrast would be advised if clinically feasible and the patient is compatible.                                      Assessment/Plan:      * Bacteremia due to group B Streptococcus  --both BCx show sensitivities to IV Vancomycin- Cefepime discontinued at this time  --repeat BCx obtained on 11/14 no growth to date- continue to follow   --ID following, will f/u for definitive IV abx recs    Bacteremia due to methicillin resistant Staphylococcus aureus  --both BCx show sensitivities to IV Vancomycin- Cefepime discontinued at this time  --repeat BCx obtained on 11/14 no growth to date- continue to follow   --ID following, will f/u for definitive IV abx recs      Decubitus ulcer of right heel  --wound care addressed, betadine, cleaned wound  then bandaged   --angiogram pending      Gas gangrene of foot  Management per Podiatry, vascular surgery  --S/P I&D 11/13  --Angiogram pending      Diabetes mellitus  --glucoses more controlled this AM  --continue basal insulin at 20 units QHS, 5u QAM  --SSI, prandial insulin therapy-aspart 5 TIDWM  --Accuchecks ACQHS  --Fasting AM glucose goal <140      TIERA (acute kidney injury)  S/p IV fluids  Trend Cr- improving- resolved  Continue to monitor with daily labs    Severe sepsis  resolved  -Leukocytosis resolved, patient afebrile  -see plan for osteo      Acute osteomyelitis of metatarsal bone of left foot  -S/p I&D on 11/13  -BC (11/13) grew group B strep- repeat cultures (11/14) no growth to date- abx de-escalated to Vancomycin only  -MRI of L FOOT: Large ulceration extending along 1st metatarsal shaft to 1st proximal phalanx. Abnormal bone marrow signal enhancement compatible with osteomyelitis.   -Podiatry rec angiogram of left leg- vascular surgery consulted  -Angiogram of bilateral LE's on 11/15-successful recannulization of R femoral  -plan for debridement and wound vac placement this morning (11/16)        VTE Risk Mitigation (From admission, onward)           Ordered     enoxaparin injection 40 mg  Daily         11/12/22 2232     IP VTE HIGH RISK PATIENT  Once         11/12/22 2232     Place sequential compression device  Until discontinued         11/12/22 2232                    Discharge Planning   KIT:      Code Status: Full Code   Is the patient medically ready for discharge?:     Reason for patient still in hospital (select all that apply): Patient trending condition and Treatment  Discharge Plan A: Home            Jas Killian MD  Department of Hospital Medicine   [facility] - Med Surg

## 2022-11-17 NOTE — PLAN OF CARE
EVAL AND TX COMPLETED: facilitated bed mobility with SPV, transfers with SBA. Ambulated CGA 20ft x 2 with RW. Recommend HHPT with RW

## 2022-11-17 NOTE — ANESTHESIA PREPROCEDURE EVALUATION
11/16/2022  Joseph Paris Jr. is a 52 y.o., male.    Patient Active Problem List   Diagnosis    Abrasion of left foot    Abrasion of left calf    Acute osteomyelitis of metatarsal bone of left foot    Severe sepsis    TIERA (acute kidney injury)    Diabetes mellitus    Gas gangrene of foot    Decubitus ulcer of right heel    Bacteremia due to group B Streptococcus     Pre-op Assessment    I have reviewed the Patient Summary Reports.    I have reviewed the NPO Status.   I have reviewed the Medications.     Review of Systems  Anesthesia Hx:  Denies Family Hx of Anesthesia complications.   Denies Personal Hx of Anesthesia complications.   Hematology/Oncology:  Hematology Normal      Current/Recent Cancer. Oncology Comments: Esophageal cancer    EENT/Dental:EENT/Dental Normal   Cardiovascular:   Hypertension CAD  CABG/stent  PVD ECG has been reviewed.    Pulmonary:  Pulmonary Normal    Renal/:  Renal/ Normal     Hepatic/GI:  Hepatic/GI Normal    Neurological:  Neurology Normal    Endocrine:   Diabetes    Psych:  Psychiatric Normal           Physical Exam  General: Alert and Oriented    Airway:  Mallampati: II   Mouth Opening: Normal  TM Distance: Normal  Tongue: Normal  Neck ROM: Normal ROM        Anesthesia Plan  Type of Anesthesia, risks & benefits discussed:    Anesthesia Type: Gen ETT, Gen Supraglottic Airway, MAC  Intra-op Monitoring Plan: Standard ASA Monitors  Informed Consent: Informed consent signed with the Patient and all parties understand the risks and agree with anesthesia plan.  All questions answered.   ASA Score: 3  Day of Surgery Review of History & Physical: I have interviewed and examined the patient. I have reviewed the patient's H&P dated:     Ready For Surgery From Anesthesia Perspective.     .

## 2022-11-17 NOTE — PLAN OF CARE
Patient remains free from injury, vss, updated on current care plan, all orders acknowledged.    Problem: Adult Inpatient Plan of Care  Goal: Plan of Care Review  Outcome: Ongoing, Progressing  Goal: Patient-Specific Goal (Individualized)  Outcome: Ongoing, Progressing  Goal: Absence of Hospital-Acquired Illness or Injury  Outcome: Ongoing, Progressing  Goal: Optimal Comfort and Wellbeing  Outcome: Ongoing, Progressing  Goal: Readiness for Transition of Care  Outcome: Ongoing, Progressing     Problem: Diabetes Comorbidity  Goal: Blood Glucose Level Within Targeted Range  Outcome: Ongoing, Progressing     Problem: Adjustment to Illness (Sepsis/Septic Shock)  Goal: Optimal Coping  Outcome: Ongoing, Progressing     Problem: Bleeding (Sepsis/Septic Shock)  Goal: Absence of Bleeding  Outcome: Ongoing, Progressing     Problem: Glycemic Control Impaired (Sepsis/Septic Shock)  Goal: Blood Glucose Level Within Desired Range  Outcome: Ongoing, Progressing     Problem: Infection Progression (Sepsis/Septic Shock)  Goal: Absence of Infection Signs and Symptoms  Outcome: Ongoing, Progressing     Problem: Nutrition Impaired (Sepsis/Septic Shock)  Goal: Optimal Nutrition Intake  Outcome: Ongoing, Progressing     Problem: Fluid and Electrolyte Imbalance (Acute Kidney Injury/Impairment)  Goal: Fluid and Electrolyte Balance  Outcome: Ongoing, Progressing     Problem: Oral Intake Inadequate (Acute Kidney Injury/Impairment)  Goal: Optimal Nutrition Intake  Outcome: Ongoing, Progressing     Problem: Renal Function Impairment (Acute Kidney Injury/Impairment)  Goal: Effective Renal Function  Outcome: Ongoing, Progressing     Problem: Skin Injury Risk Increased  Goal: Skin Health and Integrity  Outcome: Ongoing, Progressing     Problem: Pain Acute  Goal: Acceptable Pain Control and Functional Ability  Outcome: Ongoing, Progressing     Problem: Fall Injury Risk  Goal: Absence of Fall and Fall-Related Injury  Outcome: Ongoing, Progressing

## 2022-11-17 NOTE — PT/OT/SLP EVAL
Physical Therapy Evaluation    Patient Name:  Joseph Paris Jr.   MRN:  [id number]    Recommendations:     Discharge Recommendations:  home health PT   Discharge Equipment Recommendations: walker, rolling   Barriers to discharge: None    Assessment:     Joseph Paris Jr. is a 52 y.o. male admitted with a medical diagnosis of Bacteremia due to group B Streptococcus.  He presents with the following impairments/functional limitations:  weakness, impaired endurance, impaired functional mobility, gait instability, impaired balance, decreased safety awareness, decreased lower extremity function, impaired skin which limits safe mobility and increases risk for falls. Pt able to tolerate RW mobility with LLE NWB but with increased distance and fatigue pt began to hop more rather than using UE and RW for assist forward advancement which is concerning for R ankle/heel.     Rehab Prognosis: Good; patient would benefit from acute skilled PT services to address these deficits and reach maximum level of function.    Recent Surgery: Procedure(s) (LRB):  INCISION AND DRAINAGE, FOOT (Left)  APPLICATION, WOUND VAC (Left) Day of Surgery    Plan:     During this hospitalization, patient to be seen 2 x/week to address the identified rehab impairments via neuromuscular re-education, therapeutic exercises, therapeutic activities, gait training and progress toward the following goals:    Plan of Care Expires:  12/01/22    Subjective     Chief Complaint: bacteremia due to grp B streptococcus now s/p I&D with wound vac placement to LLE/foot  Patient/Family Comments/goals: to go home  Pain/Comfort:  Pain Rating 1: 0/10  Pain Addressed 2: Reposition, Distraction    Patients cultural, spiritual, Protestant conflicts given the current situation: no    Living Environment:  Pt lives with mother in [facility] with small threshold step at entry.  Prior to admission, patients level of function was mod I, using knee scooter for mobility and walking intermittently.   "Equipment used at home:  (knee scooter).  DME owned (not currently used): none.  Upon discharge, patient will have assistance from unknown.    Objective:     Communicated with nurse prior to session.  Patient found supine with peripheral IV, telemetry, wound vac  upon PT entry to room.    General Precautions: Standard, fall   Orthopedic Precautions:LLE non weight bearing   Braces: N/A  Respiratory Status: Room air    Exams:  RLE ROM: WFL  RLE Strength: WFL  LLE ROM: WFL  LLE Strength: WFL  B feet and ankles bandaged limiting ROM    Functional Mobility:  Bed Mobility:     Supine to Sit: supervision  Sit to Supine: supervision  Transfers:     Sit to Stand:  stand by assistance with rolling walker  Bed to Chair: contact guard assistance with  rolling walker  using  Stand Pivot  Gait: 20ft x 2 CGA with RW; pt receptive to gait training with RW and NWB status. Often sets foot on ground "to stabilize." Prior to OOB mobility pt reported discomfort to R heel, but made no reports of increased discomfort during gait.   Gait limited by increased fatigue "I'm not used to using my arms like this to walk".      AM-PAC 6 CLICK MOBILITY  Total Score:16       Treatment & Education:  Educated pt on call don't fall policy as well as benefits of BSC to reduce risk of falls or violating WB status for bathroom privileges. Nsg notified.     Patient left supine with all lines intact, call button in reach, and nursing notified.    GOALS:   Multidisciplinary Problems       Physical Therapy Goals          Problem: Physical Therapy    Goal Priority Disciplines Outcome Goal Variances Interventions   Physical Therapy Goal     PT, PT/OT      Description: Pt will perform bed mobility independently in order to participate in EOB activity.  Pt will perform transfers independently in order to participate in OOB activity.   Pt will ambulate 100ft mod I with LRAD in order to participate in daily tasks adhering to NWB status to LLE                 "       History:     Past Medical History:   Diagnosis Date    Coronary artery disease     Diabetes mellitus     Esophageal cancer     Hypertension        Past Surgical History:   Procedure Laterality Date    ANGIOGRAPHY OF LOWER EXTREMITY N/A 11/15/2022    Procedure: ANGIOGRAM, LOWER EXTREMITY/left leg poss pta/stent;  Surgeon: Cameron Berman MD;  Location: [facility] CATH LAB;  Service: Peripheral Vascular;  Laterality: N/A;  possible 130 start time    ATHERECTOMY  11/15/2022    Procedure: Atherectomy;  Surgeon: Cameron Berman MD;  Location: [facility] CATH LAB;  Service: Peripheral Vascular;;    CORONARY ARTERY BYPASS GRAFT      FEMORAL BYPASS      INCISION AND DRAINAGE FOOT Left 11/13/2022    Procedure: INCISION AND DRAINAGE, FOOT;  Surgeon: Sierra Augustine DPM;  Location: [facility] OR;  Service: Podiatry;  Laterality: Left;    PERCUTANEOUS TRANSLUMINAL ANGIOPLASTY N/A 11/15/2022    Procedure: PTA (ANGIOPLASTY, PERCUTANEOUS, TRANSLUMINAL);  Surgeon: Cameron Berman MD;  Location: [facility] CATH LAB;  Service: Peripheral Vascular;  Laterality: N/A;       Time Tracking:     PT Received On: 11/17/22  PT Start Time: 1055     PT Stop Time: 1120  PT Total Time (min): 25 min     Billable Minutes: Evaluation 10 and Gait Training 15      11/17/2022

## 2022-11-17 NOTE — PLAN OF CARE
IV ABT therapy remains in progress. No adverse reactions noted, remains afebrile. Compression wrap to RLE. Wound vac to LLE. NWB to LLE. Pt ambulated via walker with therapy, tolerated well. Accu checks AC&HS. Call light in reach.

## 2022-11-17 NOTE — OP NOTE
[facility]  Podiatric Medicine & Surgery  Operative Report    SUMMARY     Date of Procedure: 11/17/2022    Surgeon(s) and Role: Surgeon(s) and Role:     * Sierra Augustine DPM - Primary    Pre-Operative Diagnosis: Pre-Op Diagnosis Codes:     * Left foot infection [L08.9]     * Gas gangrene of foot [A48.0]    Post-Operative Diagnosis: Post-Op Diagnosis Codes:     * Left foot infection [L08.9]     * Gas gangrene of foot [A48.0]    Planned Procedure: Procedure(s):  INCISION AND DRAINAGE, FOOT  APPLICATION, WOUND VAC    Technical Procedures Used:   1. Excisional debridement of wound to the dorsal aspect of the left foot   2. Application of negative pressure wound VAC therapy      Anesthesia: Local MAC    Hemostasis:  None    Estimated Blood Loss (EBL): Minimal    Drains:  Wound VAC. White foam overlying the extensor hallucis tendon.  Black foam overlying wound bed.  120 mm Hg continuous pressure    Specimens: * No specimens in log *    Implants: * No implants in log *    Complications: * No complications entered in OR log *      Description of the Findings of the Procedure: The patient was seen in the Holding Room. The risks, benefits, complications, treatment options, and expected outcomes were discussed with the patient. The risks and potential complications of their problem and purposed treatment include but are not limited to infection, nerve injury, vascular injury, nonunion/malunion/delayed union of the surgical site, persistent pain, potential skin necrosis, deep vein thrombosis, possible pulmonary embolus, complications of the anesthetics and failure of the implant.  The patient concurred with the proposed plan, giving informed consent. The patient is aware that the procedure may be a part of a staged collection of procedures for definitive cure and/or alleviation of symptoms. The site of surgery properly noted/marked. Preoperative intravenous antibiotics are hanging at bedside, and  are currently being administered via the heparin lock. The patient was taken to Operating Suite.    Once in the operative suite, the patient is transferred onto the operative table in the supine position. A TIME-OUT is taken to identify the proper patient, procedure to be performed, and laterality.  The patient is properly positioned on the operating room table for ease of dissection and for any ancillary imaging.  Nursing and ancillary OR staff prepared the patient for the procedure. The patient is adequately sedated by the Attending Anesthesiologist and/or covering CRNA.  Following this, a preoperative regional/local block was given to the the area around the previous surgical site.  Next, the operative limb was rendered sterile using betadine paint and scrub. Another TIME-OUT is taken as per protocol to identify the proper patient, correct extremity, and procedure to be performed. Once this coincided with all members of the team, the extremity was scrubbed, prepped, and draped in a sterile aseptic fashion.    Attention was directed to left foot where a large ulceration was present to the dorsal aspect of the foot.  Ulcer did measure 9.8 cm x 5.2 cm with a depth of 0.6 cm with extensor hallucis tendon exposed.  There was some nonviable devitalized tissue on the lateral aspect of the incision line as well as the heel aspect the 1st metatarsophalangeal joint.  A sharp 15 blade with pickup was utilized to perform excisional debridement of the wound which included epidermis, dermis, subcutaneous tissue.  The wound edges were brought down to healthy bleeding tissue.  Was also noted to be some devitalized tissue lateral aspect the great toe which was also removed as well appropriately.  Following excisional debridement, the wound measured 12.4 cm x 7.5 cm.    Protective layer was placed on the adjacent skin surrounding the surgical site.  White foam hydrating sponge was placed overlying the extensor hallucis tendon.   Large black foam was also utilized overlying the wound bed.  The VAC was track to the dorsal aspect of the foot and was secured in place with the appropriate dressings.  There was noted to be a small leak on the lateral aspect of the 2nd toe which was secured.  The seal check was performed and noted to be adequate without any visualize leaks.  No auditory leaks were noted as well.  Vac was placed on 120 mmHg continuous suction.  Fluffs, soft dressing was applied to the left lower extremity.    The anesthesia is weaned. There were no complications to this procedure. Any final necessary imaging to be performed in the PACU if it was not performed here in the OR suite.  The patient is transferred to the [facility] bed/stretcher, \[facility]\""3. The patient is transferred to the PACU.    Condition: stable    Disposition: PACU - hemodynamically stable.    Attestation: I was present and scrubbed for the entire procedure.

## 2022-11-17 NOTE — SUBJECTIVE & OBJECTIVE
Interval History: No acute events overnight. Doing well this AM with no complaints at our encounter. Denies CP, SOB, Abdominal pain, fevers/chills. Evaluated preoperatively.    Review of Systems  Objective:     Vital Signs (Most Recent):  Temp: 98.3 °F (36.8 °C) (11/17/22 0423)  Pulse: 76 (11/17/22 0423)  Resp: 18 (11/17/22 0423)  BP: (!) 155/80 (11/17/22 0423)  SpO2: 98 % (11/17/22 0423)   Vital Signs (24h Range):  Temp:  [97.9 °F (36.6 °C)-98.8 °F (37.1 °C)] 98.3 °F (36.8 °C)  Pulse:  [73-84] 76  Resp:  [17-18] 18  SpO2:  [93 %-98 %] 98 %  BP: (123-155)/(62-80) 155/80     Weight: 112 kg (246 lb 14.6 oz)  Body mass index is 30.86 kg/m².    Intake/Output Summary (Last 24 hours) at 11/17/2022 0800  Last data filed at 11/16/2022 1817  Gross per 24 hour   Intake 600 ml   Output --   Net 600 ml      Physical Exam  Largely unchanged  GEN: No acute distress, pleasant, body habitus normal  CARDS: regular rate and rhythm, no m/g, pulses are NOT palpable in bilateral LE  PULM: breathing comfortably on room air, chest symmetric, nonlabored, no abnormal breath sounds on auscultation  SKIN: left foot bandaged, dressing is c/d/I- R foot bandaged, but visualized during bandage change, betadine covers wound, still no active drainage on inspection  ABD: nontender, nondistended, soft, no organomegaly, BS+  Neuro: Alert and oriented x3, CN's I-IX grossly intact, sensation and motor intact; follows directions and answers questions appropriately      Significant Labs: All pertinent labs within the past 24 hours have been reviewed.  Bilirubin:   Recent Labs   Lab 11/12/22 1937   BILITOT 1.9*     Blood Culture: No results for input(s): LABBLOO in the last 48 hours.  BMP:   Recent Labs   Lab 11/17/22  0555   *   *   K 3.8   CL 94*   CO2 29   BUN 13   CREATININE 1.4   CALCIUM 8.8     CBC:   Recent Labs   Lab 11/17/22  0555   WBC 8.19   HGB 9.4*   HCT 28.5*   *     CMP:   Recent Labs   Lab 11/16/22  0937 11/17/22  0555    NA  --  133*   K  --  3.8   CL  --  94*   CO2  --  29   GLU  --  194*   BUN  --  13   CREATININE 1.3 1.4   CALCIUM  --  8.8   ANIONGAP  --  10     Urine Culture: No results for input(s): LABURIN in the last 48 hours.    Significant Imaging: I have reviewed all pertinent imaging results/findings within the past 24 hours.    Imaging Results               X-Ray Foot Complete Left (Final result)  Result time 11/12/22 19:55:06      Final result by Renzo Christopher MD (11/12/22 19:55:06)                   Impression:      Soft tissue emphysema and irregular margin of the distal aspect of the 1st metatarsal strongly concerning for acute osteomyelitis.    This report was flagged in Epic as abnormal.      Electronically signed by: Renzo Christopher  Date:    11/12/2022  Time:    19:55               Narrative:    EXAMINATION:  XR FOOT COMPLETE 3 VIEW LEFT    CLINICAL HISTORY:  .  Local infection of the skin and subcutaneous tissue, unspecified    TECHNIQUE:  AP, lateral and oblique views of the left foot were performed.    COMPARISON:  None    FINDINGS:  Soft tissue emphysema over the 1st digit and dorsum of the foot strongly concerning for infection.  Irregular margin of the 1st metatarsal concerning for osteomyelitis.  Correlation with MRI with contrast would be advised if clinically feasible and the patient is compatible.

## 2022-11-18 LAB
BACTERIA SPEC ANAEROBE CULT: NORMAL
CREAT SERPL-MCNC: 1.4 MG/DL (ref 0.5–1.4)
EST. GFR  (NO RACE VARIABLE): 60 ML/MIN/1.73 M^2
POCT GLUCOSE: 145 MG/DL (ref 70–110)
POCT GLUCOSE: 148 MG/DL (ref 70–110)
POCT GLUCOSE: 150 MG/DL (ref 70–110)
POCT GLUCOSE: 297 MG/DL (ref 70–110)
VANCOMYCIN SERPL-MCNC: 13.4 UG/ML

## 2022-11-18 PROCEDURE — 21400001 HC TELEMETRY ROOM

## 2022-11-18 PROCEDURE — 99233 SBSQ HOSP IP/OBS HIGH 50: CPT | Mod: NSCH,,, | Performed by: INTERNAL MEDICINE

## 2022-11-18 PROCEDURE — 36569 INSJ PICC 5 YR+ W/O IMAGING: CPT

## 2022-11-18 PROCEDURE — 25000003 PHARM REV CODE 250: Performed by: STUDENT IN AN ORGANIZED HEALTH CARE EDUCATION/TRAINING PROGRAM

## 2022-11-18 PROCEDURE — 63600175 PHARM REV CODE 636 W HCPCS: Performed by: STUDENT IN AN ORGANIZED HEALTH CARE EDUCATION/TRAINING PROGRAM

## 2022-11-18 PROCEDURE — 99233 PR SUBSEQUENT HOSPITAL CARE,LEVL III: ICD-10-PCS | Mod: NSCH,,, | Performed by: INTERNAL MEDICINE

## 2022-11-18 PROCEDURE — 63600175 PHARM REV CODE 636 W HCPCS: Performed by: PODIATRIST

## 2022-11-18 PROCEDURE — 82565 ASSAY OF CREATININE: CPT | Performed by: STUDENT IN AN ORGANIZED HEALTH CARE EDUCATION/TRAINING PROGRAM

## 2022-11-18 PROCEDURE — 80202 ASSAY OF VANCOMYCIN: CPT | Performed by: STUDENT IN AN ORGANIZED HEALTH CARE EDUCATION/TRAINING PROGRAM

## 2022-11-18 PROCEDURE — 25000003 PHARM REV CODE 250: Performed by: PODIATRIST

## 2022-11-18 PROCEDURE — 97116 GAIT TRAINING THERAPY: CPT | Mod: CQ

## 2022-11-18 PROCEDURE — 27000207 HC ISOLATION

## 2022-11-18 PROCEDURE — 97530 THERAPEUTIC ACTIVITIES: CPT | Mod: CQ

## 2022-11-18 PROCEDURE — C1751 CATH, INF, PER/CENT/MIDLINE: HCPCS

## 2022-11-18 PROCEDURE — 36415 COLL VENOUS BLD VENIPUNCTURE: CPT | Performed by: STUDENT IN AN ORGANIZED HEALTH CARE EDUCATION/TRAINING PROGRAM

## 2022-11-18 RX ORDER — LOPERAMIDE HYDROCHLORIDE 2 MG/1
2 CAPSULE ORAL 4 TIMES DAILY PRN
Status: DISCONTINUED | OUTPATIENT
Start: 2022-11-18 | End: 2022-11-23

## 2022-11-18 RX ORDER — CHOLESTYRAMINE 4 G/9G
1 POWDER, FOR SUSPENSION ORAL 2 TIMES DAILY
Status: DISPENSED | OUTPATIENT
Start: 2022-11-18 | End: 2022-11-20

## 2022-11-18 RX ADMIN — INSULIN ASPART 5 UNITS: 100 INJECTION, SOLUTION INTRAVENOUS; SUBCUTANEOUS at 11:11

## 2022-11-18 RX ADMIN — MUPIROCIN: 20 OINTMENT TOPICAL at 09:11

## 2022-11-18 RX ADMIN — MUPIROCIN: 20 OINTMENT TOPICAL at 08:11

## 2022-11-18 RX ADMIN — VANCOMYCIN HYDROCHLORIDE 1500 MG: 1.5 INJECTION, POWDER, LYOPHILIZED, FOR SOLUTION INTRAVENOUS at 01:11

## 2022-11-18 RX ADMIN — INSULIN ASPART 5 UNITS: 100 INJECTION, SOLUTION INTRAVENOUS; SUBCUTANEOUS at 05:11

## 2022-11-18 RX ADMIN — Medication 1 TABLET: at 08:11

## 2022-11-18 RX ADMIN — INSULIN DETEMIR 20 UNITS: 100 INJECTION, SOLUTION SUBCUTANEOUS at 09:11

## 2022-11-18 RX ADMIN — INSULIN ASPART 5 UNITS: 100 INJECTION, SOLUTION INTRAVENOUS; SUBCUTANEOUS at 08:11

## 2022-11-18 RX ADMIN — AMLODIPINE BESYLATE 5 MG: 5 TABLET ORAL at 08:11

## 2022-11-18 RX ADMIN — Medication 1 TABLET: at 05:11

## 2022-11-18 RX ADMIN — INSULIN DETEMIR 5 UNITS: 100 INJECTION, SOLUTION SUBCUTANEOUS at 08:11

## 2022-11-18 RX ADMIN — ENOXAPARIN SODIUM 40 MG: 40 INJECTION SUBCUTANEOUS at 05:11

## 2022-11-18 RX ADMIN — Medication 1 TABLET: at 11:11

## 2022-11-18 RX ADMIN — CHOLESTYRAMINE 4 G: 4 POWDER, FOR SUSPENSION ORAL at 02:11

## 2022-11-18 NOTE — PT/OT/SLP PROGRESS
Physical Therapy  Treatment    Joseph Paris Jr.   MRN: [id number]   Admitting Diagnosis: Bacteremia due to group B Streptococcus    PT Received On: 11/18/22  PT Start Time: 1430     PT Stop Time: 1455    PT Total Time (min): 25 min       Billable Minutes:  Gait Training 10 and Therapeutic Activity 15    Treatment Type: Treatment  PT/PTA: PTA     PTA Visit Number: 1       General Precautions: Standard, contact, fall  Orthopedic Precautions: LLE non weight bearing   Braces: N/A  Respiratory Status: Room air    Spiritual, Cultural Beliefs, Methodist Practices, Values that Affect Care: no    Subjective:  Communicated with charge nurse and completed Epic chart review prior to session.  Patient agreed to PT session.     Pain/Comfort  Pain Rating 1: 0/10  Pain Rating Post-Intervention 1: 0/10    Objective:   Patient found with: peripheral IV, wound vac    Functional Mobility:  Supine > sit EOB: Indep    STS from EOB > RW; SPV (VC to maintain compliance with LLE NWB)    60ft w/ RW SBA (hopping; good compliance to LLE NWB)    Stand pivot T/F to EOB w/ RW: SBA    Completed AROM TE to BLE x10 reps w/ 3sec holds: LAQ, Hip Flex, AP    Educated patient on importance of increased tolerance to upright position and direct impact on CV endurance and strength. Patient encouraged to sit up in chair for a minimum of 2 consecutive hours per day. Encouraged patient to perform AROM TE to BLE throughout the day within all available planes of motion. Re enforced importance of utilizing call light to meet needs in room and not attempt to get up without staff assistance. Patient verbalized understanding and agreed to comply.      AM-PAC 6 CLICK MOBILITY  How much help from another person does this patient currently need?   1 = Unable, Total/Dependent Assistance  2 = A lot, Maximum/Moderate Assistance  3 = A little, Minimum/Contact Guard/Supervision  4 = None, Modified [place]/Independent    Turning over in bed (including adjusting bedclothes,  sheets and blankets)?: 4  Sitting down on and standing up from a chair with arms (e.g., wheelchair, bedside commode, etc.): 4  Moving from lying on back to sitting on the side of the bed?: 4  Moving to and from a bed to a chair (including a wheelchair)?: 3  Need to walk in hospital room?: 3  Climbing 3-5 steps with a railing?: 1  Basic Mobility Total Score: 19    AM-PAC Raw Score CMS G-Code Modifier Level of Impairment Assistance   6 % Total / Unable   7 - 9 CM 80 - 100% Maximal Assist   10 - 14 CL 60 - 80% Moderate Assist   15 - 19 CK 40 - 60% Moderate Assist   20 - 22 CJ 20 - 40% Minimal Assist   23 CI 1-20% SBA / CGA   24 CH 0% Independent/ Mod I     Patient left sitting edge of bed with all lines intact and call button in reach.    Assessment:  Joseph Paris Jr. is a 52 y.o. male with a medical diagnosis of Bacteremia due to group B Streptococcus and presents with overall decline in functional mobility. Patient would continue to benefit from skilled PT to address functional limitations listed below in order to return to PLOF/decrease caregiver burden.     Rehab identified problem list/impairments: Rehab identified problem list/impairments: weakness, impaired endurance, impaired sensation, decreased lower extremity function, decreased safety awareness, decreased ROM, impaired skin, edema, orthopedic precautions    Rehab potential is good.    Activity tolerance: Good    Discharge recommendations: Discharge Facility/Level of Care Needs: home health PT     Barriers to discharge:      Equipment recommendations: Equipment Needed After Discharge: walker, rolling     GOALS:   Multidisciplinary Problems       Physical Therapy Goals          Problem: Physical Therapy    Goal Priority Disciplines Outcome Goal Variances Interventions   Physical Therapy Goal     PT, PT/OT      Description: Pt will perform bed mobility independently in order to participate in EOB activity.  Pt will perform transfers independently in  order to participate in OOB activity.   Pt will ambulate 100ft mod I with LRAD in order to participate in daily tasks adhering to NWB status to LLE                       PLAN:    Patient to be seen 3 x/week  to address the above listed problems via gait training, therapeutic activities, therapeutic exercises  Plan of Care expires: 12/01/22  Plan of Care reviewed with: patient         11/18/2022

## 2022-11-18 NOTE — PROGRESS NOTES
[facility] - Med Surg  Infectious Disease  Progress Note    Patient Name: Joseph Paris Jr.  MRN: [id number]  Admission Date: 11/12/2022  Length of Stay: 5 days  Attending Physician: Jas Killian MD  Primary Care Provider: Thong Hidalgo Jr, MD    Isolation Status: Contact  Assessment/Plan:      * Bacteremia due to group B Streptococcus  Will continue Rocephin -source is the foot     Bacteremia due to methicillin resistant Staphylococcus aureus  On Vancomycin     Diabetes mellitus  Diabetic diet, insulin sliding scale     Acute osteomyelitis of metatarsal bone of left foot  Will follow cultures to guide regime  Will use Vanco,Cefepime and Flagyl   Will need to treat for 6 weeks    11/15- will follow final drug susceptibility of staph Aureus .  Continue Vanco,Cefepime and Flagyl     11/17- will treat for 6 weeks   Plan A -IV Daptomycin 700mg daily and IV Rocephin 2 gram daily   Weekly labs -CPK, CMP, CBC, ESR ,cRP  EOC 12/29/22  Plan B - IV Vanco/Rocephin for 6 weeks   Weekly labs -vanco trough , CBc,CMP, ESR, CRP  EOC 12./29/22 11/18- will insert PICC line, will follow case management for discharge planning          Anticipated Disposition:     Thank you for your consult. I will follow-up with patient. Please contact us if you have any additional questions.    Compa Leal MD, [facility]  Infectious Disease  [facility] - Med Surg    Subjective:     Principal Problem:Bacteremia due to group B Streptococcus    HPI:    52 y.o. male patient with a PMHx of HTN, DM, and CAD admitted with worsening lower extremity infection.  Labs and imaging test reviewed-  Blood culture- strep agalactiae 11/12/22  Arterial ultrasound-   Suggestion of mild stenosis within the left iliac artery. Suggestion of hemodynamically significant stenoses within the distal left SFA, distal popliteal artery, and tibioperoneal trunk.   Right lower extremity arterial system unremarkable.  MRI of the left foot-  Findings compatible with osteomyelitis of the  "1st metatarsal and 1st proximal phalanx.    .  He was seen by podiatry and had I and d done-  Operative findings -"   Gas gangrene/ Necrotizing fasciitis to the left foot. Large abscess at the first MPJ dorsal foot progressing proximally to the anterior ankle and proximal-laterally to the dorsal proximal midfoot. Very little bleeding without tourniquet or exsanguination. "  Component      Latest Ref Rng & Units 11/14/2022 11/13/2022 11/12/2022   WBC      3.90 - 12.70 K/uL 16.14 (H) 22.30 (H) 22.99 (H)     Interval History:  52 year old man with foot osteomyelitis .  Blood cultures- 11/12/22-  Strep and staph   Wound cultures- 11/13- strep  He is afebrile     11/18- he is afebrile.  [facility] cultures- strep  Review of Systems   Constitutional:  Positive for activity change, appetite change and fatigue. Negative for chills and fever.   Respiratory:  Positive for shortness of breath. Negative for chest tightness and wheezing.    Cardiovascular:  Negative for chest pain and leg swelling.   Gastrointestinal:  Negative for abdominal pain, diarrhea, nausea and vomiting.   Genitourinary:  Negative for flank pain.   Musculoskeletal:  Negative for back pain, joint swelling and myalgias.   Skin:  Positive for color change and wound.   Neurological:  Negative for syncope, weakness and light-headedness.   Psychiatric/Behavioral:  Negative for confusion.    Objective:     Vital Signs (Most Recent):  Temp: 97.9 °F (36.6 °C) (11/18/22 1533)  Pulse: 78 (11/18/22 1533)  Resp: 18 (11/18/22 1533)  BP: (!) 161/82 (11/18/22 1533)  SpO2: 100 % (11/18/22 1533)   Vital Signs (24h Range):  Temp:  [97.8 °F (36.6 °C)-98.7 °F (37.1 °C)] 97.9 °F (36.6 °C)  Pulse:  [70-81] 78  Resp:  [16-18] 18  SpO2:  [96 %-100 %] 100 %  BP: (130-172)/(64-87) 161/82     Weight: 119.9 kg (264 lb 5.3 oz)  Body mass index is 33.04 kg/m².    Estimated Creatinine Clearance: 84.2 mL/min (based on SCr of 1.4 mg/dL).    Physical Exam  Vitals and nursing note reviewed. "   HENT:      Head: Normocephalic and atraumatic.   Eyes:      Pupils: Pupils are equal, round, and reactive to light.   Neck:      Thyroid: No thyromegaly.   Cardiovascular:      Rate and Rhythm: Normal rate and regular rhythm.   Pulmonary:      Effort: Pulmonary effort is normal. No respiratory distress.      Breath sounds: No wheezing.   Abdominal:      General: Bowel sounds are normal.      Palpations: Abdomen is soft.      Tenderness: There is no abdominal tenderness.   Musculoskeletal:      Cervical back: Normal range of motion and neck supple.   Skin:     Comments: Dressing noted over lower extremity   Wound vac        Significant Labs: Blood Culture:   Recent Labs   Lab 11/12/22 1941 11/12/22 2021 11/14/22  0536   LABBLOO Gram stain silva bottle: Gram positive cocci in chains resembling Strep  Results called to and read back by:Delores Zhou RN 11/13/2022  13:16  Gram stain aer bottle: Gram positive cocci in chains resembling Strep  Positive results previously called 11/13/2022  14:47  STREPTOCOCCUS AGALACTIAE (GROUP B)  Beta-hemolytic streptococci are routinely susceptible to   penicillins,cephalosporins and carbapenems.  *  METHICILLIN RESISTANT STAPHYLOCOCCUS AUREUS  ID consult required at [facility].  ID consult required at [facility].  * Gram stain silva bottle: Gram positive cocci in chains resembling Strep  Results called to and read back by:Delores Zhou RN 11/13/2022  13:16  Gram stain aer bottle: Gram positive cocci in chains resembling Strep  Positive results previously called 11/13/2022  14:48  STREPTOCOCCUS AGALACTIAE (GROUP B)  Beta-hemolytic streptococci are routinely susceptible to   penicillins,cephalosporins and carbapenems.  For susceptibility see order #[id number]  *  METHICILLIN RESISTANT STAPHYLOCOCCUS AUREUS  ID consult required at [facility] and [facility].For   susceptibility see order #[id number]  * No Growth  to date  No Growth to date  No Growth to date  No Growth to date  No Growth to date  No Growth to date  No Growth to date  No Growth to date       BMP:   Recent Labs   Lab 11/17/22 0555 11/17/22 2223 11/18/22  1146   *  --   --    *  --   --    K 3.8  --   --    CL 94*  --   --    CO2 29  --   --    BUN 13  --   --    CREATININE 1.4   < > 1.4   CALCIUM 8.8  --   --     < > = values in this interval not displayed.       CBC:   Recent Labs   Lab 11/17/22 0555   WBC 8.19   HGB 9.4*   HCT 28.5*   *       CMP:   Recent Labs   Lab 11/17/22 0555 11/17/22 2223 11/18/22  1146   *  --   --    K 3.8  --   --    CL 94*  --   --    CO2 29  --   --    *  --   --    BUN 13  --   --    CREATININE 1.4 1.5* 1.4   CALCIUM 8.8  --   --    ANIONGAP 10  --   --          Significant Imaging: I have reviewed all pertinent imaging results/findings within the past 24 hours.

## 2022-11-18 NOTE — ASSESSMENT & PLAN NOTE
--both BCx show sensitivities to IV Vancomycin  --repeat BCx obtained on 11/14 no growth to date- continue to follow   --ID following, recommending IV Daptomycin + Rocephin x 6 weeks  --ambulatory infusion orders placed for abx and wound vac

## 2022-11-18 NOTE — PROGRESS NOTES
[facility] Medicine  Progress Note    Patient Name: Joseph Paris Jr.  MRN: [id number]  Patient Class: IP- Inpatient   Admission Date: 11/12/2022  Length of Stay: 5 days  Attending Physician: Jas Killian MD  Primary Care Provider: Thong Hidalgo Jr, MD        Subjective:     Principal Problem:Bacteremia due to group B Streptococcus        HPI:  52 y.o. male patient with a PMHx of HTN, DM, and CAD presents to the Emergency Department for evaluation of Bilateral Feet Swelling which onset gradually within the past week. The pt last saw their wound care three weeks ago. The pt lost his health insurance coverage and decided to take care of the wounds himself. The pt self drained pus from his wounds and used a heated water massager on his feet; reports feet look more infected than before. Symptoms are constant and moderate in severity. No mitigating or exacerbating factors reported. Associated sxs include fever and SOB. Patient denies any CP, N/V, weakness, dysuria, and all other sxs at this time      Overview/Hospital Course:  S/p I&D of foot per Podiatry, recommended MRI and angio with possible referral to vascular surgery. Concerns for viability of flap due to appearance of wounds. Will likely need to return to the OR later this week. Wound cultures collected during procedure, Blood culture: gram positive cocci in chains in both bottles, adjusted antibiotic regimen, added vancomycin. On 11/15- Blood cultures ultimately grew MDR group B strep, but sensitive to both Rocephin and Vancomycin. Staphylococcus speciated as well in blood, but sensitivities still pending. Angiogram ordered by Vascular surgery tentatively scheduled to be done on 11/15. Cannulization in R femoral artery successful. Patient reports notable improvement in his limb swelling and pain immediately after procedure. Further debridement tentatively scheduled for 11/17.       Interval History: No acute events overnight. Doing well  this AM with no complaints at our encounter. Denies CP, SOB, Abdominal pain, fevers/chills.    Review of Systems  Objective:     Vital Signs (Most Recent):  Temp: 98.4 °F (36.9 °C) (11/18/22 1050)  Pulse: 74 (11/18/22 1050)  Resp: 18 (11/18/22 1050)  BP: 130/64 (11/18/22 1050)  SpO2: 96 % (11/18/22 1050) Vital Signs (24h Range):  Temp:  [97.8 °F (36.6 °C)-98.7 °F (37.1 °C)] 98.4 °F (36.9 °C)  Pulse:  [70-81] 74  Resp:  [16-18] 18  SpO2:  [96 %-100 %] 96 %  BP: (130-172)/(64-87) 130/64     Weight: 119.9 kg (264 lb 5.3 oz)  Body mass index is 33.04 kg/m².    Intake/Output Summary (Last 24 hours) at 11/18/2022 1311  Last data filed at 11/17/2022 1830  Gross per 24 hour   Intake 1162.86 ml   Output 800 ml   Net 362.86 ml      Physical Exam  GEN: No acute distress, pleasant, body habitus normal  CARDS: regular rate and rhythm, no m/g, pulses are NOT palpable in bilateral LE  PULM: breathing comfortably on room air, chest symmetric, nonlabored, no abnormal breath sounds on auscultation  SKIN: left foot wound vac applied, R foot bandaged, dressing is c/d/I- still no active drainage on inspection  ABD: nontender, nondistended, soft, no organomegaly, BS+  Neuro: Alert and oriented x3, CN's I-IX grossly intact, sensation and motor intact; follows directions and answers questions appropriately      Significant Labs: All pertinent labs within the past 24 hours have been reviewed.  Blood Culture: No results for input(s): LABBLOO in the last 48 hours.  BMP:   Recent Labs   Lab 11/17/22  0555 11/17/22  2223 11/18/22  1146   *  --   --    *  --   --    K 3.8  --   --    CL 94*  --   --    CO2 29  --   --    BUN 13  --   --    CREATININE 1.4   < > 1.4   CALCIUM 8.8  --   --     < > = values in this interval not displayed.     CBC:   Recent Labs   Lab 11/17/22  0555   WBC 8.19   HGB 9.4*   HCT 28.5*   *     CMP:   Recent Labs   Lab 11/17/22  0555 11/17/22  2223 11/18/22  1146   *  --   --    K 3.8  --   --     CL 94*  --   --    CO2 29  --   --    *  --   --    BUN 13  --   --    CREATININE 1.4 1.5* 1.4   CALCIUM 8.8  --   --    ANIONGAP 10  --   --      Coagulation:   Recent Labs   Lab 11/17/22  0555   INR 1.0   APTT 30.1     Lactic Acid: No results for input(s): LACTATE in the last 48 hours.  Troponin: No results for input(s): TROPONINI, TROPONINIHS in the last 48 hours.  Urine Culture: No results for input(s): LABURIN in the last 48 hours.  Urine Studies: No results for input(s): COLORU, APPEARANCEUA, PHUR, SPECGRAV, PROTEINUA, GLUCUA, KETONESU, BILIRUBINUA, OCCULTUA, NITRITE, UROBILINOGEN, LEUKOCYTESUR, RBCUA, WBCUA, BACTERIA, SQUAMEPITHEL, HYALINECASTS in the last 48 hours.    Invalid input(s): WRIGHTSUR    Significant Imaging: I have reviewed all pertinent imaging results/findings within the past 24 hours.   Imaging Results               X-Ray Foot Complete Left (Final result)  Result time 11/12/22 19:55:06      Final result by Renzo Christopher MD (11/12/22 19:55:06)                   Impression:      Soft tissue emphysema and irregular margin of the distal aspect of the 1st metatarsal strongly concerning for acute osteomyelitis.    This report was flagged in Epic as abnormal.      Electronically signed by: Renzo Christopher  Date:    11/12/2022  Time:    19:55               Narrative:    EXAMINATION:  XR FOOT COMPLETE 3 VIEW LEFT    CLINICAL HISTORY:  .  Local infection of the skin and subcutaneous tissue, unspecified    TECHNIQUE:  AP, lateral and oblique views of the left foot were performed.    COMPARISON:  None    FINDINGS:  Soft tissue emphysema over the 1st digit and dorsum of the foot strongly concerning for infection.  Irregular margin of the 1st metatarsal concerning for osteomyelitis.  Correlation with MRI with contrast would be advised if clinically feasible and the patient is compatible.                                          Assessment/Plan:      * Bacteremia due to group B  Streptococcus  --both BCx show sensitivities to IV Vancomycin  --repeat BCx obtained on 11/14 no growth to date- continue to follow   --ID following, recommending IV Daptomycin + Rocephin x 6 weeks  --ambulatory infusion orders placed for abx and wound vac    Bacteremia due to methicillin resistant Staphylococcus aureus  See above      Decubitus ulcer of right heel  --wound care addressed, betadine, cleaned wound then bandaged   --angiogram pending      Gas gangrene of foot  Management per Podiatry, vascular surgery  --S/P I&D 11/13  --Angiogram pending      Diabetes mellitus  --AM fasting glucoses continue to be at goal range this AM  --continue basal insulin at 20 units QHS, 5u QAM  --SSI, prandial insulin therapy-aspart 5 TIDWM  --Accuchecks ACQHS  --Fasting AM glucose goal <140      TIERA (acute kidney injury)  S/p IV fluids  Trend Cr- improving- resolved  Continue to monitor with daily labs    Severe sepsis  resolved  -Leukocytosis resolved, patient afebrile  -see plan for osteo      Acute osteomyelitis of metatarsal bone of left foot  -s/p I&D on 11/13  -BC (11/13) grew group B strep- repeat cultures (11/14) no growth to date- abx de-escalated to Vancomycin only  -MRI of L FOOT: Large ulceration extending along 1st metatarsal shaft to 1st proximal phalanx. Abnormal bone marrow signal enhancement compatible with osteomyelitis.   -Podiatry rec angiogram of left leg- vascular surgery consulted  -Angiogram of bilateral LE's on 11/15-successful recannulization of R femoral  -plan for debridement and wound vac placement on 11/16        VTE Risk Mitigation (From admission, onward)         Ordered     enoxaparin injection 40 mg  Daily         11/12/22 2232     IP VTE HIGH RISK PATIENT  Once         11/12/22 2232     Place sequential compression device  Until discontinued         11/12/22 2232                Discharge Planning   KIT:      Code Status: Full Code   Is the patient medically ready for discharge?:     Reason  for patient still in hospital (select all that apply): Treatment  Discharge Plan A: Home        Pending set up of abx infusion and HH       Jsa Killian MD  Department of Hospital Medicine   [facility] Surg

## 2022-11-18 NOTE — PROGRESS NOTES
"Pharmacokinetic Assessment Follow Up: IV Vancomycin    Vancomycin serum concentration assessment(s):    The random level was drawn correctly and can be used to guide therapy at this time. The measurement is below the desired definitive target range of 15 to 20 mcg/mL.    Vancomycin Regimen Plan:    Will give a 1500mg x1 dose.  Re-dose when the random level is less than 20 mcg/mL, next level to be drawn at 1300 on 11/19    Drug levels (last 3 results):  Recent Labs   Lab Result Units 11/16/22  0937 11/17/22 2223 11/18/22  1146   Vancomycin, Random ug/mL  --   --  13.4   Vancomycin-Trough ug/mL 18.6 22.3*  --        Pharmacy will continue to follow and monitor vancomycin.    Please contact pharmacy at extension [phone] for questions regarding this assessment.    Thank you for the consult,   Cesia Randall       Patient brief summary:  Joseph Paris Jr. is a 52 y.o. male initiated on antimicrobial therapy with IV Vancomycin for treatment of bacteremia    The patient's current regimen is pulse dosing    Drug Allergies:   Review of patient's allergies indicates:   Allergen Reactions    Lisinopril Swelling    Penicillins Other (See Comments)     Pt unsure of reaction, stating "I just know I'm allergic"       Actual Body Weight:   119.9kg    Renal Function:   Estimated Creatinine Clearance: 84.2 mL/min (based on SCr of 1.4 mg/dL).,     Dialysis Method (if applicable):  N/A    CBC (last 72 hours):  Recent Labs   Lab Result Units 11/17/22  0555   WBC K/uL 8.19   Hemoglobin g/dL 9.4*   Hematocrit % 28.5*   Platelets K/uL 501*   Gran % % 56.1   Lymph % % 25.5   Mono % % 10.9   Eosinophil % % 3.1   Basophil % % 0.6   Differential Method  Automated       Metabolic Panel (last 72 hours):  Recent Labs   Lab Result Units 11/16/22  0937 11/17/22  0555 11/17/22 2223 11/18/22  1146   Sodium mmol/L  --  133*  --   --    Potassium mmol/L  --  3.8  --   --    Chloride mmol/L  --  94*  --   --    CO2 mmol/L  --  29  --   --  "   Glucose mg/dL  --  194*  --   --    BUN mg/dL  --  13  --   --    Creatinine mg/dL 1.3 1.4 1.5* 1.4       Vancomycin Administrations:  vancomycin given in the last 96 hours                     vancomycin 1.5 g in dextrose 5 % 250 mL IVPB (ready to mix) (mg) 1,500 mg New Bag 11/18/22 1353    vancomycin 1.25 g in dextrose 5% 250 mL IVPB (ready to mix) (mg) 1,250 mg New Bag 11/17/22 1128     1,250 mg New Bag 11/16/22 2342     1,250 mg New Bag  1120    vancomycin 1.5 g in dextrose 5 % 250 mL IVPB (ready to mix) (mg) 1,500 mg New Bag 11/15/22 2142     1,500 mg New Bag  1038    vancomycin 1.5 g in dextrose 5 % 250 mL IVPB (ready to mix) ()  Restarted 11/14/22 1824     1,500 mg New Bag  1729                    Microbiologic Results:  Microbiology Results (last 7 days)       Procedure Component Value Units Date/Time    Culture, Anaerobe [[id number]] Collected: 11/13/22 1148    Order Status: Completed Specimen: Abscess from Foot, Left Updated: 11/18/22 0917     Anaerobic Culture No anaerobes isolated    Narrative:      Left Foot Abscess    Blood culture [[id number]] Collected: 11/14/22 0536    Order Status: Completed Specimen: Blood Updated: 11/17/22 2012     Blood Culture, Routine No Growth to date      No Growth to date      No Growth to date      No Growth to date    Blood culture [[id number]] Collected: 11/14/22 0536    Order Status: Completed Specimen: Blood Updated: 11/17/22 2012     Blood Culture, Routine No Growth to date      No Growth to date      No Growth to date      No Growth to date    Fungus culture [[id number]] Collected: 11/13/22 1148    Order Status: Completed Specimen: Abscess from Foot, Left Updated: 11/17/22 1005     Fungus (Mycology) Culture Culture in progress    Narrative:      Left Foot Abscess    Aerobic culture [[id number]]  (Abnormal) Collected: 11/13/22 1148    Order Status: Completed Specimen: Abscess from Foot, Left Updated: 11/16/22 1333     Aerobic Bacterial Culture STREPTOCOCCUS AGALACTIAE  (GROUP B)  Many  Beta-hemolytic streptococci are routinely susceptible to   penicillins,cephalosporins and carbapenems.      Narrative:      Left Foot Abscess    Blood Culture #2 **CANNOT BE ORDERED STAT** [[id number]]  (Abnormal) Collected: 11/12/22 2021    Order Status: Completed Specimen: Blood from Peripheral, Forearm, Left Updated: 11/16/22 1152     Blood Culture, Routine Gram stain silva bottle: Gram positive cocci in chains resembling Strep      Results called to and read back by:Delores Zhou RN 11/13/2022  13:16      Gram stain aer bottle: Gram positive cocci in chains resembling Strep      Positive results previously called 11/13/2022  14:48      STREPTOCOCCUS AGALACTIAE (GROUP B)  Beta-hemolytic streptococci are routinely susceptible to   penicillins,cephalosporins and carbapenems.  For susceptibility see order #[id number]        METHICILLIN RESISTANT STAPHYLOCOCCUS AUREUS  ID consult required at [facility].For   susceptibility see order #[id number]      Blood Culture #1 **CANNOT BE ORDERED STAT** [[id number]]  (Abnormal)  (Susceptibility) Collected: 11/12/22 1941    Order Status: Completed Specimen: Blood from Peripheral, Antecubital, Left Updated: 11/16/22 1127     Blood Culture, Routine Gram stain silva bottle: Gram positive cocci in chains resembling Strep      Results called to and read back by:Delores Zhou RN 11/13/2022  13:16      Gram stain aer bottle: Gram positive cocci in chains resembling Strep      Positive results previously called 11/13/2022  14:47      STREPTOCOCCUS AGALACTIAE (GROUP B)  Beta-hemolytic streptococci are routinely susceptible to   penicillins,cephalosporins and carbapenems.        METHICILLIN RESISTANT STAPHYLOCOCCUS AUREUS  ID consult required at [facility].  ID consult required at [facility] beds at the [facility] and [facility].      AFB Culture & Smear [[id number]] Collected: 11/13/22 1148    Order Status: Completed Specimen:  Abscess from Foot, Left Updated: 11/14/22 2127     AFB Culture & Smear Culture in progress     AFB CULTURE STAIN No acid fast bacilli seen.    Narrative:      Left Foot Abscess    Gram stain [[id number]] Collected: 11/13/22 1148    Order Status: Completed Specimen: Abscess from Foot, Left Updated: 11/13/22 2106     Gram Stain Result Rare WBC's      Rare Gram positive cocci      Rare Gram negative rods    Narrative:      Left Foot Abscess    Influenza A & B by Molecular [[id number]] Collected: 11/12/22 2027    Order Status: Completed Specimen: Nasopharyngeal Swab Updated: 11/12/22 2102     Influenza A, Molecular Negative     Influenza B, Molecular Negative     Flu A & B Source Nasal swab    Influenza A & B by Molecular [[id number]] Collected: 11/12/22 1933    Order Status: Canceled Specimen: Nasopharyngeal Swab

## 2022-11-18 NOTE — PROGRESS NOTES
[facility] - Med Surg  Infectious Disease  Progress Note    Patient Name: Joseph Paris Jr.  MRN: [id number]  Admission Date: 11/12/2022  Length of Stay: 5 days  Attending Physician: Jas Killian MD  Primary Care Provider: Thong Hidalgo Jr, MD    Isolation Status: Contact  Assessment/Plan:      * Bacteremia due to group B Streptococcus  Will continue Rocephin -source is the foot     Bacteremia due to methicillin resistant Staphylococcus aureus  On Vancomycin     Diabetes mellitus  Diabetic diet, insulin sliding scale     Acute osteomyelitis of metatarsal bone of left foot  Will follow cultures to guide regime  Will use Vanco,Cefepime and Flagyl   Will need to treat for 6 weeks    11/15- will follow final drug susceptibility of staph Aureus .  Continue Vanco,Cefepime and Flagyl     11/17- will treat for 6 weeks   Plan A -IV Daptomycin 700mg daily and IV Rocephin 2 gram daily   Weekly labs -CPK, CMP, CBC, ESR ,cRP  EOC 12/29/22  Plan B - IV Vanco/Rocephin for 6 weeks   Weekly labs -vanco trough , CBc,CMP, ESR, CRP  EOC 12./29/22        Anticipated Disposition:     Thank you for your consult. I will follow-up with patient. Please contact us if you have any additional questions.    Compa Leal MD, [facility]  Infectious Disease  [facility] - Med Surg    Subjective:     Principal Problem:Bacteremia due to group B Streptococcus    HPI:    52 y.o. male patient with a PMHx of HTN, DM, and CAD admitted with worsening lower extremity infection.  Labs and imaging test reviewed-  Blood culture- strep agalactiae 11/12/22  Arterial ultrasound-   Suggestion of mild stenosis within the left iliac artery. Suggestion of hemodynamically significant stenoses within the distal left SFA, distal popliteal artery, and tibioperoneal trunk.   Right lower extremity arterial system unremarkable.  MRI of the left foot-  Findings compatible with osteomyelitis of the 1st metatarsal and 1st proximal phalanx.    .  He was seen by podiatry and had I  "and d done-  Operative findings -"   Gas gangrene/ Necrotizing fasciitis to the left foot. Large abscess at the first MPJ dorsal foot progressing proximally to the anterior ankle and proximal-laterally to the dorsal proximal midfoot. Very little bleeding without tourniquet or exsanguination. "  Component      Latest Ref Rng & Units 11/14/2022 11/13/2022 11/12/2022   WBC      3.90 - 12.70 K/uL 16.14 (H) 22.30 (H) 22.99 (H)     Interval History:  52 year old man with foot osteomyelitis .  Blood cultures- 11/12/22-  Strep and staph   Wound cultures- 11/13- strep  He is afebrile     11/18- he is afebrile.  [facility] cultures- strep  Review of Systems   Constitutional:  Positive for activity change, appetite change and fatigue. Negative for chills and fever.   Respiratory:  Positive for shortness of breath. Negative for chest tightness and wheezing.    Cardiovascular:  Negative for chest pain and leg swelling.   Gastrointestinal:  Positive for diarrhea. Negative for abdominal pain, nausea and vomiting.   Genitourinary:  Negative for flank pain.   Musculoskeletal:  Negative for back pain, joint swelling and myalgias.   Skin:  Positive for color change and wound.   Neurological:  Negative for syncope, weakness and light-headedness.   Psychiatric/Behavioral:  Negative for confusion.    Objective:     Vital Signs (Most Recent):  Temp: 98.5 °F (36.9 °C) (11/17/22 2018)  Pulse: 79 (11/17/22 2018)  Resp: 18 (11/17/22 2018)  BP: (!) 142/82 (11/17/22 2018)  SpO2: 100 % (11/17/22 2018)   Vital Signs (24h Range):  Temp:  [97.9 °F (36.6 °C)-98.5 °F (36.9 °C)] 98.5 °F (36.9 °C)  Pulse:  [67-81] 79  Resp:  [10-20] 18  SpO2:  [93 %-100 %] 100 %  BP: (116-172)/(66-87) 142/82     Weight: 112 kg (246 lb 14.6 oz)  Body mass index is 30.86 kg/m².    Estimated Creatinine Clearance: 76 mL/min (A) (based on SCr of 1.5 mg/dL (H)).    Physical Exam  Vitals and nursing note reviewed.   HENT:      Head: Normocephalic and atraumatic.   Eyes:      " Pupils: Pupils are equal, round, and reactive to light.   Neck:      Thyroid: No thyromegaly.   Cardiovascular:      Rate and Rhythm: Normal rate and regular rhythm.   Pulmonary:      Effort: Pulmonary effort is normal. No respiratory distress.      Breath sounds: No wheezing.   Abdominal:      General: Bowel sounds are normal.      Palpations: Abdomen is soft.      Tenderness: There is no abdominal tenderness.   Musculoskeletal:      Cervical back: Normal range of motion and neck supple.   Skin:     Comments: Dressing noted over lower extremity   Wound vac        Significant Labs: Blood Culture:   Recent Labs   Lab 11/12/22 1941 11/12/22 2021 11/14/22  0536   LABBLOO Gram stain silva bottle: Gram positive cocci in chains resembling Strep  Results called to and read back by:Delores Zhou RN 11/13/2022  13:16  Gram stain aer bottle: Gram positive cocci in chains resembling Strep  Positive results previously called 11/13/2022  14:47  STREPTOCOCCUS AGALACTIAE (GROUP B)  Beta-hemolytic streptococci are routinely susceptible to   penicillins,cephalosporins and carbapenems.  *  METHICILLIN RESISTANT STAPHYLOCOCCUS AUREUS  ID consult required at [facility].  ID consult required at [facility].  * Gram stain silva bottle: Gram positive cocci in chains resembling Strep  Results called to and read back by:Delores Zhou RN 11/13/2022  13:16  Gram stain aer bottle: Gram positive cocci in chains resembling Strep  Positive results previously called 11/13/2022  14:48  STREPTOCOCCUS AGALACTIAE (GROUP B)  Beta-hemolytic streptococci are routinely susceptible to   penicillins,cephalosporins and carbapenems.  For susceptibility see order #[id number]  *  METHICILLIN RESISTANT STAPHYLOCOCCUS AUREUS  ID consult required at [facility].For   susceptibility see order #[id number]  * No Growth to date  No Growth to date  No Growth to date  No Growth  to date  No Growth to date  No Growth to date  No Growth to date  No Growth to date       BMP:   Recent Labs   Lab 11/17/22 0555 11/17/22 2223   *  --    *  --    K 3.8  --    CL 94*  --    CO2 29  --    BUN 13  --    CREATININE 1.4 1.5*   CALCIUM 8.8  --        CBC:   Recent Labs   Lab 11/17/22 0555   WBC 8.19   HGB 9.4*   HCT 28.5*   *       CMP:   Recent Labs   Lab 11/16/22  0937 11/17/22 0555 11/17/22 2223   NA  --  133*  --    K  --  3.8  --    CL  --  94*  --    CO2  --  29  --    GLU  --  194*  --    BUN  --  13  --    CREATININE 1.3 1.4 1.5*   CALCIUM  --  8.8  --    ANIONGAP  --  10  --          Significant Imaging: I have reviewed all pertinent imaging results/findings within the past 24 hours.

## 2022-11-18 NOTE — ASSESSMENT & PLAN NOTE
Will follow cultures to guide regime  Will use Vanco,Cefepime and Flagyl   Will need to treat for 6 weeks    11/15- will follow final drug susceptibility of staph Aureus .  Continue Vanco,Cefepime and Flagyl     11/17- will treat for 6 weeks   Plan A -IV Daptomycin 700mg daily and IV Rocephin 2 gram daily   Weekly labs -CPK, CMP, CBC, ESR ,cRP  EOC 12/29/22  Plan B - IV Vanco/Rocephin for 6 weeks   Weekly labs -vanco trough , CBc,CMP, ESR, CRP  EOC 12./29/22

## 2022-11-18 NOTE — ASSESSMENT & PLAN NOTE
--AM fasting glucoses continue to be at goal range this AM  --continue basal insulin at 20 units QHS, 5u QAM  --SSI, prandial insulin therapy-aspart 5 TIDWM  --Accuchecks [facility]  --Fasting AM glucose goal <140

## 2022-11-18 NOTE — SUBJECTIVE & OBJECTIVE
Interval History: No acute events overnight. Doing well this AM with no complaints at our encounter. Denies CP, SOB, Abdominal pain, fevers/chills.    Review of Systems  Objective:     Vital Signs (Most Recent):  Temp: 98.4 °F (36.9 °C) (11/18/22 1050)  Pulse: 74 (11/18/22 1050)  Resp: 18 (11/18/22 1050)  BP: 130/64 (11/18/22 1050)  SpO2: 96 % (11/18/22 1050) Vital Signs (24h Range):  Temp:  [97.8 °F (36.6 °C)-98.7 °F (37.1 °C)] 98.4 °F (36.9 °C)  Pulse:  [70-81] 74  Resp:  [16-18] 18  SpO2:  [96 %-100 %] 96 %  BP: (130-172)/(64-87) 130/64     Weight: 119.9 kg (264 lb 5.3 oz)  Body mass index is 33.04 kg/m².    Intake/Output Summary (Last 24 hours) at 11/18/2022 1311  Last data filed at 11/17/2022 1830  Gross per 24 hour   Intake 1162.86 ml   Output 800 ml   Net 362.86 ml      Physical Exam  GEN: No acute distress, pleasant, body habitus normal  CARDS: regular rate and rhythm, no m/g, pulses are NOT palpable in bilateral LE  PULM: breathing comfortably on room air, chest symmetric, nonlabored, no abnormal breath sounds on auscultation  SKIN: left foot wound vac applied, R foot bandaged, dressing is c/d/I- still no active drainage on inspection  ABD: nontender, nondistended, soft, no organomegaly, BS+  Neuro: Alert and oriented x3, CN's I-IX grossly intact, sensation and motor intact; follows directions and answers questions appropriately      Significant Labs: All pertinent labs within the past 24 hours have been reviewed.  Blood Culture: No results for input(s): LABBLOO in the last 48 hours.  BMP:   Recent Labs   Lab 11/17/22  0555 11/17/22  2223 11/18/22  1146   *  --   --    *  --   --    K 3.8  --   --    CL 94*  --   --    CO2 29  --   --    BUN 13  --   --    CREATININE 1.4   < > 1.4   CALCIUM 8.8  --   --     < > = values in this interval not displayed.     CBC:   Recent Labs   Lab 11/17/22  0555   WBC 8.19   HGB 9.4*   HCT 28.5*   *     CMP:   Recent Labs   Lab 11/17/22  0512  11/17/22  2223 11/18/22  1146   *  --   --    K 3.8  --   --    CL 94*  --   --    CO2 29  --   --    *  --   --    BUN 13  --   --    CREATININE 1.4 1.5* 1.4   CALCIUM 8.8  --   --    ANIONGAP 10  --   --      Coagulation:   Recent Labs   Lab 11/17/22  0555   INR 1.0   APTT 30.1     Lactic Acid: No results for input(s): LACTATE in the last 48 hours.  Troponin: No results for input(s): TROPONINI, TROPONINIHS in the last 48 hours.  Urine Culture: No results for input(s): LABURIN in the last 48 hours.  Urine Studies: No results for input(s): COLORU, APPEARANCEUA, PHUR, SPECGRAV, PROTEINUA, GLUCUA, KETONESU, BILIRUBINUA, OCCULTUA, NITRITE, UROBILINOGEN, LEUKOCYTESUR, RBCUA, WBCUA, BACTERIA, SQUAMEPITHEL, HYALINECASTS in the last 48 hours.    Invalid input(s): WRIGHTSUR    Significant Imaging: I have reviewed all pertinent imaging results/findings within the past 24 hours.   Imaging Results               X-Ray Foot Complete Left (Final result)  Result time 11/12/22 19:55:06      Final result by Renzo Christopher MD (11/12/22 19:55:06)                   Impression:      Soft tissue emphysema and irregular margin of the distal aspect of the 1st metatarsal strongly concerning for acute osteomyelitis.    This report was flagged in Epic as abnormal.      Electronically signed by: Renzo Christopher  Date:    11/12/2022  Time:    19:55               Narrative:    EXAMINATION:  XR FOOT COMPLETE 3 VIEW LEFT    CLINICAL HISTORY:  .  Local infection of the skin and subcutaneous tissue, unspecified    TECHNIQUE:  AP, lateral and oblique views of the left foot were performed.    COMPARISON:  None    FINDINGS:  Soft tissue emphysema over the 1st digit and dorsum of the foot strongly concerning for infection.  Irregular margin of the 1st metatarsal concerning for osteomyelitis.  Correlation with MRI with contrast would be advised if clinically feasible and the patient is compatible.                                       Glycopyrrolate Pregnancy And Lactation Text: This medication is Pregnancy Category B and is considered safe during pregnancy. It is unknown if it is excreted breast milk.

## 2022-11-18 NOTE — ASSESSMENT & PLAN NOTE
-s/p I&D on 11/13  -BC (11/13) grew group B strep- repeat cultures (11/14) no growth to date- abx de-escalated to Vancomycin only  -MRI of L FOOT: Large ulceration extending along 1st metatarsal shaft to 1st proximal phalanx. Abnormal bone marrow signal enhancement compatible with osteomyelitis.   -Podiatry rec angiogram of left leg- vascular surgery consulted  -Angiogram of bilateral LE's on 11/15-successful recannulization of R femoral  -plan for debridement and wound vac placement on 11/16

## 2022-11-18 NOTE — SUBJECTIVE & OBJECTIVE
Interval History:  52 year old man with foot osteomyelitis .  Blood cultures- 11/12/22-  Strep and staph   Wound cultures- 11/13- strep  He is afebrile     11/18- he is afebrile.  [facility] cultures- strep  Review of Systems   Constitutional:  Positive for activity change, appetite change and fatigue. Negative for chills and fever.   Respiratory:  Positive for shortness of breath. Negative for chest tightness and wheezing.    Cardiovascular:  Negative for chest pain and leg swelling.   Gastrointestinal:  Positive for diarrhea. Negative for abdominal pain, nausea and vomiting.   Genitourinary:  Negative for flank pain.   Musculoskeletal:  Negative for back pain, joint swelling and myalgias.   Skin:  Positive for color change and wound.   Neurological:  Negative for syncope, weakness and light-headedness.   Psychiatric/Behavioral:  Negative for confusion.    Objective:     Vital Signs (Most Recent):  Temp: 98.5 °F (36.9 °C) (11/17/22 2018)  Pulse: 79 (11/17/22 2018)  Resp: 18 (11/17/22 2018)  BP: (!) 142/82 (11/17/22 2018)  SpO2: 100 % (11/17/22 2018)   Vital Signs (24h Range):  Temp:  [97.9 °F (36.6 °C)-98.5 °F (36.9 °C)] 98.5 °F (36.9 °C)  Pulse:  [67-81] 79  Resp:  [10-20] 18  SpO2:  [93 %-100 %] 100 %  BP: (116-172)/(66-87) 142/82     Weight: 112 kg (246 lb 14.6 oz)  Body mass index is 30.86 kg/m².    Estimated Creatinine Clearance: 76 mL/min (A) (based on SCr of 1.5 mg/dL (H)).    Physical Exam  Vitals and nursing note reviewed.   HENT:      Head: Normocephalic and atraumatic.   Eyes:      Pupils: Pupils are equal, round, and reactive to light.   Neck:      Thyroid: No thyromegaly.   Cardiovascular:      Rate and Rhythm: Normal rate and regular rhythm.   Pulmonary:      Effort: Pulmonary effort is normal. No respiratory distress.      Breath sounds: No wheezing.   Abdominal:      General: Bowel sounds are normal.      Palpations: Abdomen is soft.      Tenderness: There is no abdominal tenderness.   Musculoskeletal:       Cervical back: Normal range of motion and neck supple.   Skin:     Comments: Dressing noted over lower extremity   Wound vac        Significant Labs: Blood Culture:   Recent Labs   Lab 11/12/22 1941 11/12/22 2021 11/14/22  0536   LABBLOO Gram stain silva bottle: Gram positive cocci in chains resembling Strep  Results called to and read back by:Delores Zhou RN 11/13/2022  13:16  Gram stain aer bottle: Gram positive cocci in chains resembling Strep  Positive results previously called 11/13/2022  14:47  STREPTOCOCCUS AGALACTIAE (GROUP B)  Beta-hemolytic streptococci are routinely susceptible to   penicillins,cephalosporins and carbapenems.  *  METHICILLIN RESISTANT STAPHYLOCOCCUS AUREUS  ID consult required at [facility].  ID consult required at [facility].  * Gram stain silva bottle: Gram positive cocci in chains resembling Strep  Results called to and read back by:Delores Zhou RN 11/13/2022  13:16  Gram stain aer bottle: Gram positive cocci in chains resembling Strep  Positive results previously called 11/13/2022  14:48  STREPTOCOCCUS AGALACTIAE (GROUP B)  Beta-hemolytic streptococci are routinely susceptible to   penicillins,cephalosporins and carbapenems.  For susceptibility see order #[id number]  *  METHICILLIN RESISTANT STAPHYLOCOCCUS AUREUS  ID consult required at [facility].For   susceptibility see order #[id number]  * No Growth to date  No Growth to date  No Growth to date  No Growth to date  No Growth to date  No Growth to date  No Growth to date  No Growth to date       BMP:   Recent Labs   Lab 11/17/22 0555 11/17/22 2223   *  --    *  --    K 3.8  --    CL 94*  --    CO2 29  --    BUN 13  --    CREATININE 1.4 1.5*   CALCIUM 8.8  --        CBC:   Recent Labs   Lab 11/17/22  0555   WBC 8.19   HGB 9.4*   HCT 28.5*   *       CMP:   Recent Labs   Lab 11/16/22 0937 11/17/22 0555  11/17/22  2223   NA  --  133*  --    K  --  3.8  --    CL  --  94*  --    CO2  --  29  --    GLU  --  194*  --    BUN  --  13  --    CREATININE 1.3 1.4 1.5*   CALCIUM  --  8.8  --    ANIONGAP  --  10  --          Significant Imaging: I have reviewed all pertinent imaging results/findings within the past 24 hours.

## 2022-11-18 NOTE — ASSESSMENT & PLAN NOTE
Will follow cultures to guide regime  Will use Vanco,Cefepime and Flagyl   Will need to treat for 6 weeks    11/15- will follow final drug susceptibility of staph Aureus .  Continue Vanco,Cefepime and Flagyl     11/17- will treat for 6 weeks   Plan A -IV Daptomycin 700mg daily and IV Rocephin 2 gram daily   Weekly labs -CPK, CMP, CBC, ESR ,cRP  EOC 12/29/22  Plan B - IV Vanco/Rocephin for 6 weeks   Weekly labs -vanco trough , CBc,CMP, ESR, CRP  EOC 12./29/22 11/18- will insert PICC line, will follow case management for discharge planning

## 2022-11-18 NOTE — PROGRESS NOTES
"Pharmacokinetic Assessment Follow Up: IV Vancomycin    Vancomycin serum concentration assessment(s):    The trough level was drawn correctly and can be used to guide therapy at this time. The measurement is above the desired definitive target range of 15 to 20 mcg/mL.    Vancomycin Regimen Plan:    Discontinue the scheduled vancomycin regimen and re-dose when the random level is less than 20 mcg/mL, next level to be drawn at 1130 on 11/18/22..    Drug levels (last 3 results):  Recent Labs   Lab Result Units 11/15/22  0534 11/16/22  0937 11/17/22  2223   Vancomycin, Random ug/mL 13.9  --   --    Vancomycin-Trough ug/mL  --  18.6 22.3*       Pharmacy will continue to follow and monitor vancomycin.    Please contact pharmacy at extension [phone] for questions regarding this assessment.    Thank you for the consult,   Jamil Lewis       Patient brief summary:  Joseph Paris Jr. is a 52 y.o. male initiated on antimicrobial therapy with IV Vancomycin for treatment of bone/joint infection, bacteremia      Drug Allergies:   Review of patient's allergies indicates:   Allergen Reactions    Lisinopril Swelling    Penicillins Other (See Comments)     Pt unsure of reaction, stating "I just know I'm allergic"       Actual Body Weight:   112 kg    Renal Function:   Estimated Creatinine Clearance: 76 mL/min (A) (based on SCr of 1.5 mg/dL (H)).,     Dialysis Method (if applicable):  N/A    CBC (last 72 hours):  Recent Labs   Lab Result Units 11/15/22  0637 11/17/22  0555   WBC K/uL 9.85 8.19   Hemoglobin g/dL 8.8* 9.4*   Hematocrit % 26.3* 28.5*   Platelets K/uL 402 501*   Gran % % 62.4 56.1   Lymph % % 20.9 25.5   Mono % % 11.8 10.9   Eosinophil % % 3.0 3.1   Basophil % % 0.5 0.6   Differential Method  Automated Automated       Metabolic Panel (last 72 hours):  Recent Labs   Lab Result Units 11/15/22  0534 11/16/22  0937 11/17/22  0555 11/17/22  2223   Sodium mmol/L 128*  --  133*  --    Potassium mmol/L 4.5  --  3.8  --    Chloride " mmol/L 92*  --  94*  --    CO2 mmol/L 25  --  29  --    Glucose mg/dL 264*  --  194*  --    BUN mg/dL 23*  --  13  --    Creatinine mg/dL 1.3 1.3 1.4 1.5*       Vancomycin Administrations:  vancomycin given in the last 96 hours                     vancomycin 1.25 g in dextrose 5% 250 mL IVPB (ready to mix) (mg) 1,250 mg New Bag 11/17/22 1128     1,250 mg New Bag 11/16/22 2342     1,250 mg New Bag  1120    vancomycin 1.5 g in dextrose 5 % 250 mL IVPB (ready to mix) (mg) 1,500 mg New Bag 11/15/22 2142     1,500 mg New Bag  1038    vancomycin 1.5 g in dextrose 5 % 250 mL IVPB (ready to mix) ()  Restarted 11/14/22 1824     1,500 mg New Bag  1729    vancomycin 1.25 g in dextrose 5% 250 mL IVPB (ready to mix) (mg) 1,250 mg New Bag 11/14/22 0241                    Microbiologic Results:  Microbiology Results (last 7 days)       Procedure Component Value Units Date/Time    Blood culture [[id number]] Collected: 11/14/22 0536    Order Status: Completed Specimen: Blood Updated: 11/17/22 2012     Blood Culture, Routine No Growth to date      No Growth to date      No Growth to date      No Growth to date    Blood culture [[id number]] Collected: 11/14/22 0536    Order Status: Completed Specimen: Blood Updated: 11/17/22 2012     Blood Culture, Routine No Growth to date      No Growth to date      No Growth to date      No Growth to date    Fungus culture [[id number]] Collected: 11/13/22 1148    Order Status: Completed Specimen: Abscess from Foot, Left Updated: 11/17/22 1005     Fungus (Mycology) Culture Culture in progress    Narrative:      Left Foot Abscess    Aerobic culture [[id number]]  (Abnormal) Collected: 11/13/22 1148    Order Status: Completed Specimen: Abscess from Foot, Left Updated: 11/16/22 1333     Aerobic Bacterial Culture STREPTOCOCCUS AGALACTIAE (GROUP B)  Many  Beta-hemolytic streptococci are routinely susceptible to   penicillins,cephalosporins and carbapenems.      Narrative:      Left Foot Abscess    Blood  Culture #2 **CANNOT BE ORDERED STAT** [[id number]]  (Abnormal) Collected: 11/12/22 2021    Order Status: Completed Specimen: Blood from Peripheral, Forearm, Left Updated: 11/16/22 1152     Blood Culture, Routine Gram stain silva bottle: Gram positive cocci in chains resembling Strep      Results called to and read back by:Delores Zhou RN 11/13/2022  13:16      Gram stain aer bottle: Gram positive cocci in chains resembling Strep      Positive results previously called 11/13/2022  14:48      STREPTOCOCCUS AGALACTIAE (GROUP B)  Beta-hemolytic streptococci are routinely susceptible to   penicillins,cephalosporins and carbapenems.  For susceptibility see order #[id number]        METHICILLIN RESISTANT STAPHYLOCOCCUS AUREUS  ID consult required at [facility].For   susceptibility see order #[id number]      Blood Culture #1 **CANNOT BE ORDERED STAT** [[id number]]  (Abnormal)  (Susceptibility) Collected: 11/12/22 1941    Order Status: Completed Specimen: Blood from Peripheral, Antecubital, Left Updated: 11/16/22 1127     Blood Culture, Routine Gram stain silva bottle: Gram positive cocci in chains resembling Strep      Results called to and read back by:Delores Zhou RN 11/13/2022  13:16      Gram stain aer bottle: Gram positive cocci in chains resembling Strep      Positive results previously called 11/13/2022  14:47      STREPTOCOCCUS AGALACTIAE (GROUP B)  Beta-hemolytic streptococci are routinely susceptible to   penicillins,cephalosporins and carbapenems.        METHICILLIN RESISTANT STAPHYLOCOCCUS AUREUS  ID consult required at [facility].  ID consult required at [facility].      Culture, Anaerobe [[id number]] Collected: 11/13/22 1148    Order Status: Completed Specimen: Abscess from Foot, Left Updated: 11/16/22 0927     Anaerobic Culture Culture in progress    Narrative:      Left Foot Abscess    AFB Culture & Smear [[id number]] Collected:  11/13/22 1148    Order Status: Completed Specimen: Abscess from Foot, Left Updated: 11/14/22 2127     AFB Culture & Smear Culture in progress     AFB CULTURE STAIN No acid fast bacilli seen.    Narrative:      Left Foot Abscess    Gram stain [[id number]] Collected: 11/13/22 1148    Order Status: Completed Specimen: Abscess from Foot, Left Updated: 11/13/22 2106     Gram Stain Result Rare WBC's      Rare Gram positive cocci      Rare Gram negative rods    Narrative:      Left Foot Abscess    Influenza A & B by Molecular [[id number]] Collected: 11/12/22 2027    Order Status: Completed Specimen: Nasopharyngeal Swab Updated: 11/12/22 2102     Influenza A, Molecular Negative     Influenza B, Molecular Negative     Flu A & B Source Nasal swab    Influenza A & B by Molecular [[id number]] Collected: 11/12/22 1933    Order Status: Canceled Specimen: Nasopharyngeal Swab

## 2022-11-18 NOTE — PLAN OF CARE
Pt remains free from falls/injuries this shift. Safety precautions maintained. Pain managed with relaxation and rest. Patient toe touch weight bearing ambulating in room. Wound vac to left foot. Awaiting PICC line. Call light and personal belongings in reach.No s/s of acute distress noted. Will continue to monitor. Chart check completed.

## 2022-11-18 NOTE — SUBJECTIVE & OBJECTIVE
Interval History:  52 year old man with foot osteomyelitis .  Blood cultures- 11/12/22-  Strep and staph   Wound cultures- 11/13- strep  He is afebrile     11/18- he is afebrile.  [facility] cultures- strep  Review of Systems   Constitutional:  Positive for activity change, appetite change and fatigue. Negative for chills and fever.   Respiratory:  Positive for shortness of breath. Negative for chest tightness and wheezing.    Cardiovascular:  Negative for chest pain and leg swelling.   Gastrointestinal:  Negative for abdominal pain, diarrhea, nausea and vomiting.   Genitourinary:  Negative for flank pain.   Musculoskeletal:  Negative for back pain, joint swelling and myalgias.   Skin:  Positive for color change and wound.   Neurological:  Negative for syncope, weakness and light-headedness.   Psychiatric/Behavioral:  Negative for confusion.    Objective:     Vital Signs (Most Recent):  Temp: 97.9 °F (36.6 °C) (11/18/22 1533)  Pulse: 78 (11/18/22 1533)  Resp: 18 (11/18/22 1533)  BP: (!) 161/82 (11/18/22 1533)  SpO2: 100 % (11/18/22 1533)   Vital Signs (24h Range):  Temp:  [97.8 °F (36.6 °C)-98.7 °F (37.1 °C)] 97.9 °F (36.6 °C)  Pulse:  [70-81] 78  Resp:  [16-18] 18  SpO2:  [96 %-100 %] 100 %  BP: (130-172)/(64-87) 161/82     Weight: 119.9 kg (264 lb 5.3 oz)  Body mass index is 33.04 kg/m².    Estimated Creatinine Clearance: 84.2 mL/min (based on SCr of 1.4 mg/dL).    Physical Exam  Vitals and nursing note reviewed.   HENT:      Head: Normocephalic and atraumatic.   Eyes:      Pupils: Pupils are equal, round, and reactive to light.   Neck:      Thyroid: No thyromegaly.   Cardiovascular:      Rate and Rhythm: Normal rate and regular rhythm.   Pulmonary:      Effort: Pulmonary effort is normal. No respiratory distress.      Breath sounds: No wheezing.   Abdominal:      General: Bowel sounds are normal.      Palpations: Abdomen is soft.      Tenderness: There is no abdominal tenderness.   Musculoskeletal:      Cervical back:  Normal range of motion and neck supple.   Skin:     Comments: Dressing noted over lower extremity   Wound vac        Significant Labs: Blood Culture:   Recent Labs   Lab 11/12/22 1941 11/12/22 2021 11/14/22  0536   LABBLOO Gram stain silva bottle: Gram positive cocci in chains resembling Strep  Results called to and read back by:Delores Zhou RN 11/13/2022  13:16  Gram stain aer bottle: Gram positive cocci in chains resembling Strep  Positive results previously called 11/13/2022  14:47  STREPTOCOCCUS AGALACTIAE (GROUP B)  Beta-hemolytic streptococci are routinely susceptible to   penicillins,cephalosporins and carbapenems.  *  METHICILLIN RESISTANT STAPHYLOCOCCUS AUREUS  ID consult required at [facility].  ID consult required at [facility].  * Gram stain silva bottle: Gram positive cocci in chains resembling Strep  Results called to and read back by:Delores Zhou RN 11/13/2022  13:16  Gram stain aer bottle: Gram positive cocci in chains resembling Strep  Positive results previously called 11/13/2022  14:48  STREPTOCOCCUS AGALACTIAE (GROUP B)  Beta-hemolytic streptococci are routinely susceptible to   penicillins,cephalosporins and carbapenems.  For susceptibility see order #[id number]  *  METHICILLIN RESISTANT STAPHYLOCOCCUS AUREUS  ID consult required at [facility].For   susceptibility see order #[id number]  * No Growth to date  No Growth to date  No Growth to date  No Growth to date  No Growth to date  No Growth to date  No Growth to date  No Growth to date       BMP:   Recent Labs   Lab 11/17/22  0555 11/17/22 2223 11/18/22  1146   *  --   --    *  --   --    K 3.8  --   --    CL 94*  --   --    CO2 29  --   --    BUN 13  --   --    CREATININE 1.4   < > 1.4   CALCIUM 8.8  --   --     < > = values in this interval not displayed.       CBC:   Recent Labs   Lab 11/17/22  0555   WBC 8.19   HGB 9.4*   HCT  28.5*   *       CMP:   Recent Labs   Lab 11/17/22  0555 11/17/22  2223 11/18/22  1146   *  --   --    K 3.8  --   --    CL 94*  --   --    CO2 29  --   --    *  --   --    BUN 13  --   --    CREATININE 1.4 1.5* 1.4   CALCIUM 8.8  --   --    ANIONGAP 10  --   --          Significant Imaging: I have reviewed all pertinent imaging results/findings within the past 24 hours.

## 2022-11-19 LAB
BACTERIA BLD CULT: NORMAL
BACTERIA BLD CULT: NORMAL
CREAT SERPL-MCNC: 1.3 MG/DL (ref 0.5–1.4)
EST. GFR  (NO RACE VARIABLE): >60 ML/MIN/1.73 M^2
POCT GLUCOSE: 157 MG/DL (ref 70–110)
POCT GLUCOSE: 159 MG/DL (ref 70–110)
POCT GLUCOSE: 163 MG/DL (ref 70–110)
POCT GLUCOSE: 193 MG/DL (ref 70–110)
POCT GLUCOSE: 218 MG/DL (ref 70–110)
VANCOMYCIN SERPL-MCNC: 7.2 UG/ML

## 2022-11-19 PROCEDURE — 63600175 PHARM REV CODE 636 W HCPCS: Performed by: INTERNAL MEDICINE

## 2022-11-19 PROCEDURE — 63600175 PHARM REV CODE 636 W HCPCS: Performed by: PODIATRIST

## 2022-11-19 PROCEDURE — 25000003 PHARM REV CODE 250: Performed by: STUDENT IN AN ORGANIZED HEALTH CARE EDUCATION/TRAINING PROGRAM

## 2022-11-19 PROCEDURE — 63600175 PHARM REV CODE 636 W HCPCS: Performed by: STUDENT IN AN ORGANIZED HEALTH CARE EDUCATION/TRAINING PROGRAM

## 2022-11-19 PROCEDURE — 25000003 PHARM REV CODE 250: Performed by: PODIATRIST

## 2022-11-19 PROCEDURE — 82565 ASSAY OF CREATININE: CPT | Performed by: STUDENT IN AN ORGANIZED HEALTH CARE EDUCATION/TRAINING PROGRAM

## 2022-11-19 PROCEDURE — 80202 ASSAY OF VANCOMYCIN: CPT | Performed by: STUDENT IN AN ORGANIZED HEALTH CARE EDUCATION/TRAINING PROGRAM

## 2022-11-19 PROCEDURE — 27000207 HC ISOLATION

## 2022-11-19 PROCEDURE — 21400001 HC TELEMETRY ROOM

## 2022-11-19 RX ORDER — AMITRIPTYLINE HYDROCHLORIDE 50 MG/1
50 TABLET, FILM COATED ORAL NIGHTLY
Status: DISCONTINUED | OUTPATIENT
Start: 2022-11-19 | End: 2022-11-29 | Stop reason: HOSPADM

## 2022-11-19 RX ADMIN — CHOLESTYRAMINE 4 G: 4 POWDER, FOR SUSPENSION ORAL at 08:11

## 2022-11-19 RX ADMIN — AMITRIPTYLINE HYDROCHLORIDE 50 MG: 50 TABLET, FILM COATED ORAL at 08:11

## 2022-11-19 RX ADMIN — INSULIN ASPART 2 UNITS: 100 INJECTION, SOLUTION INTRAVENOUS; SUBCUTANEOUS at 12:11

## 2022-11-19 RX ADMIN — INSULIN ASPART 2 UNITS: 100 INJECTION, SOLUTION INTRAVENOUS; SUBCUTANEOUS at 06:11

## 2022-11-19 RX ADMIN — INSULIN ASPART 5 UNITS: 100 INJECTION, SOLUTION INTRAVENOUS; SUBCUTANEOUS at 12:11

## 2022-11-19 RX ADMIN — Medication 1 TABLET: at 05:11

## 2022-11-19 RX ADMIN — INSULIN DETEMIR 5 UNITS: 100 INJECTION, SOLUTION SUBCUTANEOUS at 08:11

## 2022-11-19 RX ADMIN — CEFTRIAXONE 2 G: 2 INJECTION, POWDER, FOR SOLUTION INTRAMUSCULAR; INTRAVENOUS at 11:11

## 2022-11-19 RX ADMIN — AMLODIPINE BESYLATE 5 MG: 5 TABLET ORAL at 08:11

## 2022-11-19 RX ADMIN — ENOXAPARIN SODIUM 40 MG: 40 INJECTION SUBCUTANEOUS at 05:11

## 2022-11-19 RX ADMIN — MUPIROCIN: 20 OINTMENT TOPICAL at 08:11

## 2022-11-19 RX ADMIN — CEFTRIAXONE 2 G: 2 INJECTION, POWDER, FOR SOLUTION INTRAMUSCULAR; INTRAVENOUS at 12:11

## 2022-11-19 RX ADMIN — INSULIN DETEMIR 20 UNITS: 100 INJECTION, SOLUTION SUBCUTANEOUS at 09:11

## 2022-11-19 RX ADMIN — INSULIN ASPART 5 UNITS: 100 INJECTION, SOLUTION INTRAVENOUS; SUBCUTANEOUS at 05:11

## 2022-11-19 RX ADMIN — VANCOMYCIN HYDROCHLORIDE 2000 MG: 10 INJECTION, POWDER, LYOPHILIZED, FOR SOLUTION INTRAVENOUS at 03:11

## 2022-11-19 RX ADMIN — INSULIN ASPART 2 UNITS: 100 INJECTION, SOLUTION INTRAVENOUS; SUBCUTANEOUS at 09:11

## 2022-11-19 RX ADMIN — INSULIN ASPART 2 UNITS: 100 INJECTION, SOLUTION INTRAVENOUS; SUBCUTANEOUS at 05:11

## 2022-11-19 RX ADMIN — Medication 1 TABLET: at 12:11

## 2022-11-19 RX ADMIN — Medication 1 TABLET: at 08:11

## 2022-11-19 RX ADMIN — INSULIN ASPART 5 UNITS: 100 INJECTION, SOLUTION INTRAVENOUS; SUBCUTANEOUS at 08:11

## 2022-11-19 RX ADMIN — LOPERAMIDE HYDROCHLORIDE 2 MG: 2 CAPSULE ORAL at 05:11

## 2022-11-19 NOTE — PLAN OF CARE
Problem: Adult Inpatient Plan of Care  Goal: Plan of Care Review  Outcome: Ongoing, Progressing  Goal: Patient-Specific Goal (Individualized)  Outcome: Ongoing, Progressing  Goal: Absence of Hospital-Acquired Illness or Injury  Outcome: Ongoing, Progressing  Goal: Optimal Comfort and Wellbeing  Outcome: Ongoing, Progressing  Goal: Readiness for Transition of Care  Outcome: Ongoing, Progressing     Problem: Diabetes Comorbidity  Goal: Blood Glucose Level Within Targeted Range  Outcome: Ongoing, Progressing     Problem: Adjustment to Illness (Sepsis/Septic Shock)  Goal: Optimal Coping  Outcome: Ongoing, Progressing     Problem: Bleeding (Sepsis/Septic Shock)  Goal: Absence of Bleeding  Outcome: Ongoing, Progressing     Problem: Glycemic Control Impaired (Sepsis/Septic Shock)  Goal: Blood Glucose Level Within Desired Range  Outcome: Ongoing, Progressing     Problem: Infection Progression (Sepsis/Septic Shock)  Goal: Absence of Infection Signs and Symptoms  Outcome: Ongoing, Progressing     Problem: Nutrition Impaired (Sepsis/Septic Shock)  Goal: Optimal Nutrition Intake  Outcome: Ongoing, Progressing     Problem: Fluid and Electrolyte Imbalance (Acute Kidney Injury/Impairment)  Goal: Fluid and Electrolyte Balance  Outcome: Ongoing, Progressing     Problem: Oral Intake Inadequate (Acute Kidney Injury/Impairment)  Goal: Optimal Nutrition Intake  Outcome: Ongoing, Progressing     Problem: Renal Function Impairment (Acute Kidney Injury/Impairment)  Goal: Effective Renal Function  Outcome: Ongoing, Progressing     Problem: Skin Injury Risk Increased  Goal: Skin Health and Integrity  Outcome: Ongoing, Progressing     Problem: Pain Acute  Goal: Acceptable Pain Control and Functional Ability  Outcome: Ongoing, Progressing     Problem: Fall Injury Risk  Goal: Absence of Fall and Fall-Related Injury  Outcome: Ongoing, Progressing     Problem: Infection  Goal: Absence of Infection Signs and Symptoms  Outcome: Ongoing,  Progressing     Problem: Impaired Wound Healing  Goal: Optimal Wound Healing  Outcome: Ongoing, Progressing

## 2022-11-19 NOTE — SUBJECTIVE & OBJECTIVE
Interval History: Complains of some trouble sleeping due to anxiety (generalized). Otherwise, doing well this AM with no complaints at our encounter. Denies CP, SOB, Abdominal pain, fevers/chills. Significant other is at bedside, also inquired about patient's anxiety.     Review of Systems  Objective:     Vital Signs (Most Recent):  Temp: 97.9 °F (36.6 °C) (11/19/22 0735)  Pulse: 76 (11/19/22 0735)  Resp: 16 (11/19/22 0735)  BP: (!) 147/76 (11/19/22 0735)  SpO2: 99 % (11/19/22 0735)   Vital Signs (24h Range):  Temp:  [97.9 °F (36.6 °C)-98.5 °F (36.9 °C)] 97.9 °F (36.6 °C)  Pulse:  [69-82] 76  Resp:  [15-18] 16  SpO2:  [96 %-100 %] 99 %  BP: (130-161)/(64-82) 147/76     Weight: 119.9 kg (264 lb 5.3 oz)  Body mass index is 33.04 kg/m².    Intake/Output Summary (Last 24 hours) at 11/19/2022 1048  Last data filed at 11/18/2022 1300  Gross per 24 hour   Intake 380 ml   Output --   Net 380 ml      Physical Exam  GEN: No acute distress, pleasant, body habitus normal  CARDS: regular rate and rhythm, no m/g, pulses are NOT palpable in bilateral LE  PULM: breathing comfortably on room air, chest symmetric, nonlabored, no abnormal breath sounds on auscultation  SKIN: left foot wound vac applied, R foot bandaged, dressing is c/d/I- still no active drainage on inspection  ABD: nontender, nondistended, soft, no organomegaly, BS+  Neuro: Alert and oriented x3, CN's I-IX grossly intact, sensation and motor intact; follows directions and answers questions appropriately      Significant Labs: All pertinent labs within the past 24 hours have been reviewed.  Blood Culture: No results for input(s): LABBLOO in the last 48 hours.  CBC: No results for input(s): WBC, HGB, HCT, PLT in the last 48 hours.  CMP:   Recent Labs   Lab 11/17/22  3585 11/18/22  1146   CREATININE 1.5* 1.4     Coagulation: No results for input(s): PT, INR, APTT in the last 48 hours.  Lactic Acid: No results for input(s): LACTATE in the last 48 hours.  Magnesium: No  results for input(s): MG in the last 48 hours.  Urine Culture: No results for input(s): LABURIN in the last 48 hours.    Significant Imaging: I have reviewed all pertinent imaging results/findings within the past 24 hours.

## 2022-11-19 NOTE — PROGRESS NOTES
[facility]  Progress Note    Patient Name: Joseph Paris Jr.  MRN: [id number]  Patient Class: IP- Inpatient   Admission Date: 11/12/2022  Length of Stay: 6 days  Attending Physician: Jas Killian MD  Primary Care Provider: Thong Hidalgo Jr, MD        Subjective:     Principal Problem:Bacteremia due to group B Streptococcus    HPI:  52 y.o. male patient with a PMHx of HTN, DM, and CAD presents to the Emergency Department for evaluation of Bilateral Feet Swelling which onset gradually within the past week. The pt last saw their wound care three weeks ago. The pt lost his health insurance coverage and decided to take care of the wounds himself. The pt self drained pus from his wounds and used a heated water massager on his feet; reports feet look more infected than before. Symptoms are constant and moderate in severity. No mitigating or exacerbating factors reported. Associated sxs include fever and SOB. Patient denies any CP, N/V, weakness, dysuria, and all other sxs at this time      Overview/Hospital Course:  S/p I&D of foot per Podiatry, recommended MRI and angio with possible referral to vascular surgery. Concerns for viability of flap due to appearance of wounds. Will likely need to return to the OR later this week. Wound cultures collected during procedure, Blood culture: gram positive cocci in chains in both bottles, adjusted antibiotic regimen, added vancomycin. On 11/15- Blood cultures ultimately grew MDR group B strep, but sensitive to both Rocephin and Vancomycin. Staphylococcus speciated as well in blood, but sensitivities still pending. Angiogram ordered by Vascular surgery tentatively scheduled to be done on 11/15. Cannulization in R femoral artery successful. Patient reports notable improvement in his limb swelling and pain immediately after procedure. Further debridement tentatively scheduled for 11/17. Tolerated procedure well. Wound vac placed at that time as well.  PICC line inserted on 11/18/22. Definitive antibiotic recs placed by ID (see their note for specifics). Total duration of therapy 6 weeks.  Surgery plans to takedown Wound vac and reassess wound on Monday 11/21/2022.       Interval History: Complains of some trouble sleeping due to anxiety (generalized). Otherwise, doing well this AM with no complaints at our encounter. Denies CP, SOB, Abdominal pain, fevers/chills. Significant other is at bedside, also inquired about patient's anxiety.     Review of Systems  Objective:     Vital Signs (Most Recent):  Temp: 97.9 °F (36.6 °C) (11/19/22 0735)  Pulse: 76 (11/19/22 0735)  Resp: 16 (11/19/22 0735)  BP: (!) 147/76 (11/19/22 0735)  SpO2: 99 % (11/19/22 0735)   Vital Signs (24h Range):  Temp:  [97.9 °F (36.6 °C)-98.5 °F (36.9 °C)] 97.9 °F (36.6 °C)  Pulse:  [69-82] 76  Resp:  [15-18] 16  SpO2:  [96 %-100 %] 99 %  BP: (130-161)/(64-82) 147/76     Weight: 119.9 kg (264 lb 5.3 oz)  Body mass index is 33.04 kg/m².    Intake/Output Summary (Last 24 hours) at 11/19/2022 1048  Last data filed at 11/18/2022 1300  Gross per 24 hour   Intake 380 ml   Output --   Net 380 ml      Physical Exam  GEN: No acute distress, pleasant, body habitus normal  CARDS: regular rate and rhythm, no m/g, pulses are NOT palpable in bilateral LE  PULM: breathing comfortably on room air, chest symmetric, nonlabored, no abnormal breath sounds on auscultation  SKIN: left foot wound vac applied, R foot bandaged, dressing is c/d/I- still no active drainage on inspection  ABD: nontender, nondistended, soft, no organomegaly, BS+  Neuro: Alert and oriented x3, CN's I-IX grossly intact, sensation and motor intact; follows directions and answers questions appropriately      Significant Labs: All pertinent labs within the past 24 hours have been reviewed.  Blood Culture: No results for input(s): LABBLOO in the last 48 hours.  CBC: No results for input(s): WBC, HGB, HCT, PLT in the last 48 hours.  CMP:   Recent Labs    Lab 11/17/22  2223 11/18/22  1146   CREATININE 1.5* 1.4     Coagulation: No results for input(s): PT, INR, APTT in the last 48 hours.  Lactic Acid: No results for input(s): LACTATE in the last 48 hours.  Magnesium: No results for input(s): MG in the last 48 hours.  Urine Culture: No results for input(s): LABURIN in the last 48 hours.    Significant Imaging: I have reviewed all pertinent imaging results/findings within the past 24 hours.      Assessment/Plan:      * Bacteremia due to group B Streptococcus  --both BCx show sensitivities to IV Vancomycin  --repeat BCx obtained on 11/14 no growth to date- continue to follow   --ID following, recommending IV Daptomycin + Rocephin x 6 weeks  --ambulatory infusion orders placed for abx and wound vac    Bacteremia due to methicillin resistant Staphylococcus aureus  See above      Decubitus ulcer of right heel  --wound care addressed, betadine, cleaned wound then bandaged   --angiogram pending      Gas gangrene of foot  Management per Podiatry, vascular surgery  --S/P I&D 11/13  --Angiogram pending      Diabetes mellitus  --AM fasting glucoses continue to be at goal range this AM  --continue basal insulin at 20 units QHS, 5u QAM  --SSI, prandial insulin therapy-aspart 5 TIDWM  --Accuchecks ACQHS  --Fasting AM glucose goal <140      TIERA (acute kidney injury)  S/p IV fluids  Trend Cr- improving- resolved  Continue to monitor with daily labs    Severe sepsis  resolved  -Leukocytosis resolved, patient afebrile  -see plan for osteo      Acute osteomyelitis of metatarsal bone of left foot  -s/p I&D on 11/13  -BC (11/13) grew group B strep- repeat cultures (11/14) no growth to date- abx de-escalated to Vancomycin only  -MRI of L FOOT: Large ulceration extending along 1st metatarsal shaft to 1st proximal phalanx. Abnormal bone marrow signal enhancement compatible with osteomyelitis.   -Podiatry rec angiogram of left leg- vascular surgery consulted  -Angiogram of bilateral LE's on  11/15-successful recannulization of R femoral  -plan for debridement and wound vac placement on 11/16        VTE Risk Mitigation (From admission, onward)         Ordered     enoxaparin injection 40 mg  Daily         11/12/22 2232     IP VTE HIGH RISK PATIENT  Once         11/12/22 2232     Place sequential compression device  Until discontinued         11/12/22 2232                Discharge Planning   KIT:      Code Status: Full Code   Is the patient medically ready for discharge?:     Reason for patient still in hospital (select all that apply): Patient trending condition and Treatment  Discharge Plan A: Home        PICC line inserted yesterday.  Wound care on Monday to reassess wound.           Jas Killian MD  Department of Hospital Medicine   [facility] - Med Surg

## 2022-11-19 NOTE — NURSING
POC reviewed with patient. Pt verbalized understanding  AAO x4. VSS  PICC line clean, dry, intact. IV antibiotics maintained.   Wound vac to LLE on continuous suction at 125 mmhg  Dressing changed to RLE per orders.   No other c/o at this time.  Pt remains free of injuries and falls; fall precaution in place.   Bed low, side rails x2, call light in reach, personal belongings at bedside.  Reminded to call for assistance.  Repositioned independently.  Hourly rounding complete. Will continue to monitor.

## 2022-11-19 NOTE — PROCEDURES
"Joseph Paris Jr. is a 52 y.o. male patient.    Temp: 98.1 °F (36.7 °C) (11/18/22 1920)  Pulse: 82 (11/18/22 1920)  Resp: 15 (11/18/22 1920)  BP: 133/71 (11/18/22 1920)  SpO2: 97 % (11/18/22 1920)  Weight: 119.9 kg (264 lb 5.3 oz) (11/18/22 0120)  Height: 6' 3" (190.5 cm) (11/12/22 1858)    PICC  Date/Time: 11/18/2022 9:37 PM  Performed by: Jimmie Samuels RN  Supervising provider: Kristal Balderas RN  Consent Done: Yes  Time out: Immediately prior to procedure a time out was called to verify the correct patient, procedure, equipment, support staff and site/side marked as required  Indications: med administration and vascular access  Anesthesia: local infiltration  Local anesthetic: lidocaine 1% without epinephrine  Anesthetic Total (mL): 3  Preparation: skin prepped with ChloraPrep  Skin prep agent dried: skin prep agent completely dried prior to procedure  Sterile barriers: all five maximum sterile barriers used - cap, mask, sterile gown, sterile gloves, and large sterile sheet  Hand hygiene: hand hygiene performed prior to central venous catheter insertion  Location details: right basilic  Catheter type: double lumen  Catheter size: 5 Fr  Catheter Length: 41cm    Ultrasound guidance: yes  Vessel Caliber: medium and patent, compressibility normal  Vascular Doppler: not done  Needle advanced into vessel with real time Ultrasound guidance.  Guidewire confirmed in vessel.  Sterile sheath used.  no esophageal manometryNumber of attempts: 1  Post-procedure: blood return through all ports, chlorhexidine patch and sterile dressing applied  Estimated blood loss (mL): 0  Specimens: No  Implants: No  Assessment: placement verified by x-ray (see rad. report)  Complications: none        Name Jimmie Samuels RN  11/18/2022    "

## 2022-11-19 NOTE — CONSULTS
"Pharmacokinetic Assessment Follow Up: IV Vancomycin    Vancomycin serum concentration assessment(s):    The random level was drawn correctly and can be used to guide therapy at this time. The measurement is below the desired definitive target range of 15 to 20 mcg/mL.    Vancomycin Regimen Plan:    Change regimen to Vancomycin 2000 mg IV every 24 hours with next serum trough concentration measured at 1430 prior to 3rd new dose on 11/21/22.     Drug levels (last 3 results):  Recent Labs   Lab Result Units 11/17/22 2223 11/18/22  1146 11/19/22  1249   Vancomycin, Random ug/mL  --  13.4 7.2   Vancomycin-Trough ug/mL 22.3*  --   --        Pharmacy will continue to follow and monitor vancomycin.    Please contact pharmacy at extension [phone] for questions regarding this assessment.    Thank you for the consult,   Tete Jose PharmD       Patient brief summary:  Joseph Paris Jr. is a 52 y.o. male initiated on antimicrobial therapy with IV Vancomycin for treatment of bone/joint infection    The patient's current regimen is pulse dosing (dosing by level) when random level is less than 20 mcg/mL.    Drug Allergies:   Review of patient's allergies indicates:   Allergen Reactions    Lisinopril Swelling    Penicillins Other (See Comments)     Pt unsure of reaction, stating "I just know I'm allergic"     Actual Body Weight: 119.9 kg    Renal Function:   Estimated Creatinine Clearance: 90.7 mL/min (based on SCr of 1.3 mg/dL).,     Dialysis Method (if applicable):  N/A    CBC (last 72 hours):  Recent Labs   Lab Result Units 11/17/22  0555   WBC K/uL 8.19   Hemoglobin g/dL 9.4*   Hematocrit % 28.5*   Platelets K/uL 501*   Gran % % 56.1   Lymph % % 25.5   Mono % % 10.9   Eosinophil % % 3.1   Basophil % % 0.6   Differential Method  Automated       Metabolic Panel (last 72 hours):  Recent Labs   Lab Result Units 11/17/22  0555 11/17/22 2223 11/18/22  1146 11/19/22  1249   Sodium mmol/L 133*  --   --   --    Potassium mmol/L 3.8  -- "   --   --    Chloride mmol/L 94*  --   --   --    CO2 mmol/L 29  --   --   --    Glucose mg/dL 194*  --   --   --    BUN mg/dL 13  --   --   --    Creatinine mg/dL 1.4 1.5* 1.4 1.3       Vancomycin Administrations:  vancomycin given in the last 96 hours                     vancomycin 1.5 g in dextrose 5 % 250 mL IVPB (ready to mix) (mg) 1,500 mg New Bag 11/18/22 1353    vancomycin 1.25 g in dextrose 5% 250 mL IVPB (ready to mix) (mg) 1,250 mg New Bag 11/17/22 1128     1,250 mg New Bag 11/16/22 2342     1,250 mg New Bag  1120    vancomycin 1.5 g in dextrose 5 % 250 mL IVPB (ready to mix) (mg) 1,500 mg New Bag 11/15/22 2142                    Microbiologic Results:  Microbiology Results (last 7 days)       Procedure Component Value Units Date/Time    Blood culture [[id number]] Collected: 11/14/22 0536    Order Status: Completed Specimen: Blood Updated: 11/18/22 2012     Blood Culture, Routine No Growth to date      No Growth to date      No Growth to date      No Growth to date      No Growth to date    Blood culture [[id number]] Collected: 11/14/22 0536    Order Status: Completed Specimen: Blood Updated: 11/18/22 2012     Blood Culture, Routine No Growth to date      No Growth to date      No Growth to date      No Growth to date      No Growth to date    Culture, Anaerobe [[id number]] Collected: 11/13/22 1148    Order Status: Completed Specimen: Abscess from Foot, Left Updated: 11/18/22 0917     Anaerobic Culture No anaerobes isolated    Narrative:      Left Foot Abscess    Fungus culture [[id number]] Collected: 11/13/22 1148    Order Status: Completed Specimen: Abscess from Foot, Left Updated: 11/17/22 1005     Fungus (Mycology) Culture Culture in progress    Narrative:      Left Foot Abscess    Aerobic culture [[id number]]  (Abnormal) Collected: 11/13/22 1148    Order Status: Completed Specimen: Abscess from Foot, Left Updated: 11/16/22 1333     Aerobic Bacterial Culture STREPTOCOCCUS AGALACTIAE (GROUP  B)  Many  Beta-hemolytic streptococci are routinely susceptible to   penicillins,cephalosporins and carbapenems.      Narrative:      Left Foot Abscess    Blood Culture #2 **CANNOT BE ORDERED STAT** [[id number]]  (Abnormal) Collected: 11/12/22 2021    Order Status: Completed Specimen: Blood from Peripheral, Forearm, Left Updated: 11/16/22 1152     Blood Culture, Routine Gram stain silva bottle: Gram positive cocci in chains resembling Strep      Results called to and read back by:Delores Zhou RN 11/13/2022  13:16      Gram stain aer bottle: Gram positive cocci in chains resembling Strep      Positive results previously called 11/13/2022  14:48      STREPTOCOCCUS AGALACTIAE (GROUP B)  Beta-hemolytic streptococci are routinely susceptible to   penicillins,cephalosporins and carbapenems.  For susceptibility see order #[id number]        METHICILLIN RESISTANT STAPHYLOCOCCUS AUREUS  ID consult required at [facility] and [facility].For   susceptibility see order #[id number]      Blood Culture #1 **CANNOT BE ORDERED STAT** [[id number]]  (Abnormal)  (Susceptibility) Collected: 11/12/22 1941    Order Status: Completed Specimen: Blood from Peripheral, Antecubital, Left Updated: 11/16/22 1127     Blood Culture, Routine Gram stain silva bottle: Gram positive cocci in chains resembling Strep      Results called to and read back by:Delores Zhou RN 11/13/2022  13:16      Gram stain aer bottle: Gram positive cocci in chains resembling Strep      Positive results previously called 11/13/2022  14:47      STREPTOCOCCUS AGALACTIAE (GROUP B)  Beta-hemolytic streptococci are routinely susceptible to   penicillins,cephalosporins and carbapenems.        METHICILLIN RESISTANT STAPHYLOCOCCUS AUREUS  ID consult required at [facility].  ID consult required at [facility] access hospital and [facility].      AFB Culture & Smear [[id number]] Collected: 11/13/22 1148    Order Status: Completed Specimen: Abscess from  Foot, Left Updated: 11/14/22 2127     AFB Culture & Smear Culture in progress     AFB CULTURE STAIN No acid fast bacilli seen.    Narrative:      Left Foot Abscess    Gram stain [[id number]] Collected: 11/13/22 1148    Order Status: Completed Specimen: Abscess from Foot, Left Updated: 11/13/22 2106     Gram Stain Result Rare WBC's      Rare Gram positive cocci      Rare Gram negative rods    Narrative:      Left Foot Abscess    Influenza A & B by Molecular [[id number]] Collected: 11/12/22 2027    Order Status: Completed Specimen: Nasopharyngeal Swab Updated: 11/12/22 2102     Influenza A, Molecular Negative     Influenza B, Molecular Negative     Flu A & B Source Nasal swab    Influenza A & B by Molecular [[id number]] Collected: 11/12/22 1933    Order Status: Canceled Specimen: Nasopharyngeal Swab           Thank you for allowing us to participate in this patient's care.     Tete Jose, MarlynD 11/19/2022 2:22 PM

## 2022-11-19 NOTE — PLAN OF CARE
Patient is in stable condition, no acute distress, remained free from injuries, receiving IV antibiotics, no pain reported this shift, blood glucose checks performed as ordered, dressing to R foot changed as ordered, dressing to L foot CDI with wound vac in place, VSS and all orders reviewed. 24 hr chart check performed.

## 2022-11-20 LAB
CREAT SERPL-MCNC: 1.2 MG/DL (ref 0.5–1.4)
EST. GFR  (NO RACE VARIABLE): >60 ML/MIN/1.73 M^2
POCT GLUCOSE: 100 MG/DL (ref 70–110)
POCT GLUCOSE: 122 MG/DL (ref 70–110)
POCT GLUCOSE: 185 MG/DL (ref 70–110)
POCT GLUCOSE: 202 MG/DL (ref 70–110)
POCT GLUCOSE: 254 MG/DL (ref 70–110)

## 2022-11-20 PROCEDURE — 63600175 PHARM REV CODE 636 W HCPCS: Performed by: STUDENT IN AN ORGANIZED HEALTH CARE EDUCATION/TRAINING PROGRAM

## 2022-11-20 PROCEDURE — 97116 GAIT TRAINING THERAPY: CPT | Mod: CQ

## 2022-11-20 PROCEDURE — 25000003 PHARM REV CODE 250: Performed by: PODIATRIST

## 2022-11-20 PROCEDURE — 63600175 PHARM REV CODE 636 W HCPCS: Performed by: PODIATRIST

## 2022-11-20 PROCEDURE — 25000003 PHARM REV CODE 250: Performed by: NURSE PRACTITIONER

## 2022-11-20 PROCEDURE — 25000003 PHARM REV CODE 250: Performed by: STUDENT IN AN ORGANIZED HEALTH CARE EDUCATION/TRAINING PROGRAM

## 2022-11-20 PROCEDURE — 21400001 HC TELEMETRY ROOM

## 2022-11-20 PROCEDURE — 27000207 HC ISOLATION

## 2022-11-20 PROCEDURE — 82565 ASSAY OF CREATININE: CPT | Performed by: STUDENT IN AN ORGANIZED HEALTH CARE EDUCATION/TRAINING PROGRAM

## 2022-11-20 RX ORDER — GABAPENTIN 400 MG/1
800 CAPSULE ORAL DAILY
Status: DISCONTINUED | OUTPATIENT
Start: 2022-11-20 | End: 2022-11-29 | Stop reason: HOSPADM

## 2022-11-20 RX ORDER — SODIUM CHLORIDE 9 MG/ML
INJECTION, SOLUTION INTRAVENOUS
Status: DISCONTINUED | OUTPATIENT
Start: 2022-11-20 | End: 2022-11-29 | Stop reason: HOSPADM

## 2022-11-20 RX ORDER — GABAPENTIN 400 MG/1
400 CAPSULE ORAL NIGHTLY
Status: DISCONTINUED | OUTPATIENT
Start: 2022-11-20 | End: 2022-11-29 | Stop reason: HOSPADM

## 2022-11-20 RX ORDER — GABAPENTIN 300 MG/1
300 CAPSULE ORAL NIGHTLY
Status: DISCONTINUED | OUTPATIENT
Start: 2022-11-20 | End: 2022-11-20

## 2022-11-20 RX ADMIN — INSULIN ASPART 2 UNITS: 100 INJECTION, SOLUTION INTRAVENOUS; SUBCUTANEOUS at 08:11

## 2022-11-20 RX ADMIN — SODIUM CHLORIDE: 0.9 INJECTION, SOLUTION INTRAVENOUS at 04:11

## 2022-11-20 RX ADMIN — INSULIN ASPART 5 UNITS: 100 INJECTION, SOLUTION INTRAVENOUS; SUBCUTANEOUS at 08:11

## 2022-11-20 RX ADMIN — OXYCODONE HYDROCHLORIDE 5 MG: 5 TABLET ORAL at 04:11

## 2022-11-20 RX ADMIN — GABAPENTIN 800 MG: 400 CAPSULE ORAL at 11:11

## 2022-11-20 RX ADMIN — Medication 1 TABLET: at 08:11

## 2022-11-20 RX ADMIN — LOPERAMIDE HYDROCHLORIDE 2 MG: 2 CAPSULE ORAL at 08:11

## 2022-11-20 RX ADMIN — VANCOMYCIN HYDROCHLORIDE 2000 MG: 10 INJECTION, POWDER, LYOPHILIZED, FOR SOLUTION INTRAVENOUS at 04:11

## 2022-11-20 RX ADMIN — AMITRIPTYLINE HYDROCHLORIDE 50 MG: 50 TABLET, FILM COATED ORAL at 08:11

## 2022-11-20 RX ADMIN — GABAPENTIN 400 MG: 400 CAPSULE ORAL at 08:11

## 2022-11-20 RX ADMIN — INSULIN DETEMIR 5 UNITS: 100 INJECTION, SOLUTION SUBCUTANEOUS at 08:11

## 2022-11-20 RX ADMIN — AMLODIPINE BESYLATE 5 MG: 5 TABLET ORAL at 12:11

## 2022-11-20 RX ADMIN — INSULIN ASPART 5 UNITS: 100 INJECTION, SOLUTION INTRAVENOUS; SUBCUTANEOUS at 04:11

## 2022-11-20 RX ADMIN — MUPIROCIN: 20 OINTMENT TOPICAL at 08:11

## 2022-11-20 RX ADMIN — INSULIN ASPART 5 UNITS: 100 INJECTION, SOLUTION INTRAVENOUS; SUBCUTANEOUS at 12:11

## 2022-11-20 RX ADMIN — Medication 1 TABLET: at 12:11

## 2022-11-20 RX ADMIN — GABAPENTIN 300 MG: 300 CAPSULE ORAL at 06:11

## 2022-11-20 RX ADMIN — ENOXAPARIN SODIUM 40 MG: 40 INJECTION SUBCUTANEOUS at 04:11

## 2022-11-20 RX ADMIN — Medication 1 TABLET: at 04:11

## 2022-11-20 RX ADMIN — INSULIN ASPART 4 UNITS: 100 INJECTION, SOLUTION INTRAVENOUS; SUBCUTANEOUS at 12:11

## 2022-11-20 RX ADMIN — INSULIN DETEMIR 20 UNITS: 100 INJECTION, SOLUTION SUBCUTANEOUS at 08:11

## 2022-11-20 NOTE — PROGRESS NOTES
[facility] Medicine  Progress Note    Patient Name: Joseph Paris Jr.  MRN: [id number]  Patient Class: IP- Inpatient   Admission Date: 11/12/2022  Length of Stay: 7 days  Attending Physician: Jas Killian MD  Primary Care Provider: Thong Hidalgo Jr, MD        Subjective:     Principal Problem:Bacteremia due to group B Streptococcus    HPI:  52 y.o. male patient with a PMHx of HTN, DM, and CAD presents to the Emergency Department for evaluation of Bilateral Feet Swelling which onset gradually within the past week. The pt last saw their wound care three weeks ago. The pt lost his health insurance coverage and decided to take care of the wounds himself. The pt self drained pus from his wounds and used a heated water massager on his feet; reports feet look more infected than before. Symptoms are constant and moderate in severity. No mitigating or exacerbating factors reported. Associated sxs include fever and SOB. Patient denies any CP, N/V, weakness, dysuria, and all other sxs at this time      Overview/Hospital Course:  S/p I&D of foot per Podiatry, recommended MRI and angio with possible referral to vascular surgery. Concerns for viability of flap due to appearance of wounds. Will likely need to return to the OR later this week. Wound cultures collected during procedure, Blood culture: gram positive cocci in chains in both bottles, adjusted antibiotic regimen, added vancomycin. On 11/15- Blood cultures ultimately grew MDR group B strep, but sensitive to both Rocephin and Vancomycin. Staphylococcus speciated as well in blood, but sensitivities still pending. Angiogram ordered by Vascular surgery tentatively scheduled to be done on 11/15. Cannulization in R femoral artery successful. Patient reports notable improvement in his limb swelling and pain immediately after procedure. Further debridement tentatively scheduled for 11/17. Tolerated procedure well. Wound vac placed at that time as well.  PICC line inserted on 11/18/22. Definitive antibiotic recs placed by ID (see their note for specifics). Total duration of therapy 6 weeks.  Surgery plans to takedown Wound vac and reassess wound on Monday 11/21/2022.       Interval History: Some nerve pain overnight, gabapentin restarted by nocturnist group. No acute events since then. Doing well this AM with no complaints at our encounter. Denies CP, SOB, Abdominal pain, fevers/chills.      Review of Systems  Objective:     Vital Signs (Most Recent):  Temp: 98.3 °F (36.8 °C) (11/20/22 1235)  Pulse: 78 (11/20/22 1235)  Resp: 18 (11/20/22 1235)  BP: (!) 166/79 (BP med given) (11/20/22 1235)  SpO2: 100 % (11/20/22 1235)   Vital Signs (24h Range):  Temp:  [98 °F (36.7 °C)-98.6 °F (37 °C)] 98.3 °F (36.8 °C)  Pulse:  [74-83] 78  Resp:  [18-20] 18  SpO2:  [94 %-100 %] 100 %  BP: (119-166)/(62-79) 166/79     Weight: 122.8 kg (270 lb 11.6 oz)  Body mass index is 33.84 kg/m².    Intake/Output Summary (Last 24 hours) at 11/20/2022 1352  Last data filed at 11/20/2022 0831  Gross per 24 hour   Intake 958.84 ml   Output --   Net 958.84 ml      Physical Exam  GEN: No acute distress, pleasant, body habitus normal  SKIN: left foot wound vac applied, R foot bandaged, dressing is c/d/I- still no active drainage on inspection  HEENT: atraumatic and normocephalic  CARDS: regular rate and rhythm, no m/g, pulses palpable in LE  PULM: breathing comfortably on room air, chest symmetric, nonlabored, no abnormal breath sounds on auscultation  ABD: nontender, nondistended, soft, no organomegaly, BS+  Neuro: Alert and oriented x3, CN's I-IX grossly intact, sensation and motor intact; follows directions and answers questions appropriately    Significant Labs: All pertinent labs within the past 24 hours have been reviewed.  Recent Lab Results  (Last 5 results in the past 24 hours)        11/20/22  0822   11/20/22  0620   11/20/22  0601   11/19/22 2059 11/19/22  1709        Creatinine   1.2              eGFR   >60             POCT Glucose 185     122   218   157                              Significant Imaging: I have reviewed all pertinent imaging results/findings within the past 24 hours.  Imaging Results               X-Ray Foot Complete Left (Final result)  Result time 11/12/22 19:55:06      Final result by Renzo Christopher MD (11/12/22 19:55:06)                   Impression:      Soft tissue emphysema and irregular margin of the distal aspect of the 1st metatarsal strongly concerning for acute osteomyelitis.    This report was flagged in Epic as abnormal.      Electronically signed by: Renzo Christopher  Date:    11/12/2022  Time:    19:55               Narrative:    EXAMINATION:  XR FOOT COMPLETE 3 VIEW LEFT    CLINICAL HISTORY:  .  Local infection of the skin and subcutaneous tissue, unspecified    TECHNIQUE:  AP, lateral and oblique views of the left foot were performed.    COMPARISON:  None    FINDINGS:  Soft tissue emphysema over the 1st digit and dorsum of the foot strongly concerning for infection.  Irregular margin of the 1st metatarsal concerning for osteomyelitis.  Correlation with MRI with contrast would be advised if clinically feasible and the patient is compatible.                                          Assessment/Plan:      * Bacteremia due to group B Streptococcus  --both BCx show sensitivities to IV Vancomycin  --repeat BCx obtained on 11/14 no growth to date- continue to follow   --ID following, recommending IV Daptomycin + Rocephin x 6 weeks  --ambulatory infusion orders placed for abx and wound vac    Bacteremia due to methicillin resistant Staphylococcus aureus  See above      Decubitus ulcer of right heel  --wound care addressed, betadine, cleaned wound then bandaged   --angiogram pending      Gas gangrene of foot  Management per Podiatry, vascular surgery  --S/P I&D 11/13  --Angiogram pending      Diabetes mellitus  --AM fasting glucoses continue to be at goal range this  AM  --continue basal insulin at 20 units QHS, 5u QAM  --SSI, prandial insulin therapy-aspart 5 TIDWM  --Accuchecks ACQHS  --Fasting AM glucose goal <140      TIERA (acute kidney injury)  S/p IV fluids  Trend Cr- improving- resolved  Continue to monitor with daily labs    Severe sepsis  resolved  -Leukocytosis resolved, patient afebrile  -see plan for osteo      Acute osteomyelitis of metatarsal bone of left foot  -s/p I&D on 11/13  -BC (11/13) grew group B strep- repeat cultures (11/14) no growth to date- abx de-escalated to Vancomycin only  -MRI of L FOOT: Large ulceration extending along 1st metatarsal shaft to 1st proximal phalanx. Abnormal bone marrow signal enhancement compatible with osteomyelitis.   -Podiatry rec angiogram of left leg- vascular surgery consulted  -Angiogram of bilateral LE's on 11/15-successful recannulization of R femoral  -plan for debridement and wound vac placement on 11/16        VTE Risk Mitigation (From admission, onward)         Ordered     enoxaparin injection 40 mg  Daily         11/12/22 2232     IP VTE HIGH RISK PATIENT  Once         11/12/22 2232     Place sequential compression device  Until discontinued         11/12/22 2232                Discharge Planning   KIT:      Code Status: Full Code   Is the patient medically ready for discharge?:     Reason for patient still in hospital (select all that apply): Consult recommendations and Pending disposition  Discharge Plan A: Home        Wound vac takedown in the AM, once cleared by surgery, can probably discharge thereafter.       Jas Killian MD  Department of Hospital Medicine   [facility] - Med Surg

## 2022-11-20 NOTE — PT/OT/SLP PROGRESS
Physical Therapy  Treatment    Joseph Paris Jr.   MRN: [id number]   Admitting Diagnosis: Bacteremia due to group B Streptococcus       PT Start Time: 1205     PT Stop Time: 1225    PT Total Time (min): 20 min       Billable Minutes:  Gait Training 15    Treatment Type: Treatment  PT/PTA: PTA     PTA Visit Number: 2       General Precautions: Standard, fall  Orthopedic Precautions: LLE non weight bearing   Braces:    Respiratory Stat  Spiritual, Cultural Beliefs, Sabianist Practices, Values that Affect Care: no    Subjective:  Communicated with OTILIO prior to session.      Pain/Comfort  Pain Rating 1: 0/10  Pain Rating Post-Intervention 1: 0/10    Objective:   Patient found with: wound vac    Treatment and Education:    PT WAS AGREEABLE TO THERAPY    PT WITHOUT REGARD TO WEIGHT BEARING STATUS    BED MOB AND ALL T/F'S WITH S    AMBULATED WITH RW WITH WBAT ON LOWER EXTREMITY BUT WAS SUPPORT TO BE NWB ON L LOWER EXTREMITY 2 X 60' SBA WHILE WEARING BILATERAL ORTHOTIC SHOES.      AM-PAC 6 CLICK MOBILITY  How much help from another person does this patient currently need?   1 = Unable, Total/Dependent Assistance  2 = A lot, Maximum/Moderate Assistance  3 = A little, Minimum/Contact Guard/Supervision  4 = None, Modified [place]/Independent    Turning over in bed (including adjusting bedclothes, sheets and blankets)?: 4  Sitting down on and standing up from a chair with arms (e.g., wheelchair, bedside commode, etc.): 4  Moving from lying on back to sitting on the side of the bed?: 4  Moving to and from a bed to a chair (including a wheelchair)?: 4  Need to walk in hospital room?: 4  Climbing 3-5 steps with a railing?:  (NA)    AM-PAC Raw Score CMS G-Code Modifier Level of Impairment Assistance   6 % Total / Unable   7 - 9 CM 80 - 100% Maximal Assist   10 - 14 CL 60 - 80% Moderate Assist   15 - 19 CK 40 - 60% Moderate Assist   20 - 22 CJ 20 - 40% Minimal Assist   23 CI 1-20% SBA / CGA   24 CH 0% Independent/ Mod I      Patient left supine with all lines intact, call button in reach, and bed alarm on.    Assessment:  Joseph Paris Jr. is a 52 y.o. male with a medical diagnosis of Bacteremia due to group B Streptococcus and presents with .    Rehab identified problem list/impairments: Rehab identified problem list/impairments: impaired self care skills, impaired functional mobility, decreased lower extremity function    Rehab potential is excellent.    Activity tolerance: Good    Discharge recommendations: Discharge Facility/Level of Care Needs: home health PT     Barriers to discharge:      Equipment recommendations: Equipment Needed After Discharge: walker, rolling     GOALS:   Multidisciplinary Problems       Physical Therapy Goals          Problem: Physical Therapy    Goal Priority Disciplines Outcome Goal Variances Interventions   Physical Therapy Goal     PT, PT/OT      Description: Pt will perform bed mobility independently in order to participate in EOB activity.  Pt will perform transfers independently in order to participate in OOB activity.   Pt will ambulate 100ft mod I with LRAD in order to participate in daily tasks adhering to NWB status to LLE                       PLAN:    Patient to be seen 3 x/week  to address the above listed problems via gait training, therapeutic activities, therapeutic exercises  Plan of Care expires: 12/01/22  Plan of Care reviewed with: patient         11/20/2022

## 2022-11-20 NOTE — SUBJECTIVE & OBJECTIVE
Interval History: Some nerve pain overnight, gabapentin restarted by nocturnist group. No acute events since then. Doing well this AM with no complaints at our encounter. Denies CP, SOB, Abdominal pain, fevers/chills.      Review of Systems  Objective:     Vital Signs (Most Recent):  Temp: 98.3 °F (36.8 °C) (11/20/22 1235)  Pulse: 78 (11/20/22 1235)  Resp: 18 (11/20/22 1235)  BP: (!) 166/79 (BP med given) (11/20/22 1235)  SpO2: 100 % (11/20/22 1235)   Vital Signs (24h Range):  Temp:  [98 °F (36.7 °C)-98.6 °F (37 °C)] 98.3 °F (36.8 °C)  Pulse:  [74-83] 78  Resp:  [18-20] 18  SpO2:  [94 %-100 %] 100 %  BP: (119-166)/(62-79) 166/79     Weight: 122.8 kg (270 lb 11.6 oz)  Body mass index is 33.84 kg/m².    Intake/Output Summary (Last 24 hours) at 11/20/2022 1352  Last data filed at 11/20/2022 0831  Gross per 24 hour   Intake 958.84 ml   Output --   Net 958.84 ml      Physical Exam  GEN: No acute distress, pleasant, body habitus normal  SKIN: left foot wound vac applied, R foot bandaged, dressing is c/d/I- still no active drainage on inspection  HEENT: atraumatic and normocephalic  CARDS: regular rate and rhythm, no m/g, pulses palpable in LE  PULM: breathing comfortably on room air, chest symmetric, nonlabored, no abnormal breath sounds on auscultation  ABD: nontender, nondistended, soft, no organomegaly, BS+  Neuro: Alert and oriented x3, CN's I-IX grossly intact, sensation and motor intact; follows directions and answers questions appropriately    Significant Labs: All pertinent labs within the past 24 hours have been reviewed.  Recent Lab Results  (Last 5 results in the past 24 hours)        11/20/22  0822   11/20/22  0620   11/20/22  0601   11/19/22 2059 11/19/22  1709        Creatinine   1.2             eGFR   >60             POCT Glucose 185     122   218   157                              Significant Imaging: I have reviewed all pertinent imaging results/findings within the past 24 hours.  Imaging Results                X-Ray Foot Complete Left (Final result)  Result time 11/12/22 19:55:06      Final result by Renzo Christopher MD (11/12/22 19:55:06)                   Impression:      Soft tissue emphysema and irregular margin of the distal aspect of the 1st metatarsal strongly concerning for acute osteomyelitis.    This report was flagged in Epic as abnormal.      Electronically signed by: Renzo Christopher  Date:    11/12/2022  Time:    19:55               Narrative:    EXAMINATION:  XR FOOT COMPLETE 3 VIEW LEFT    CLINICAL HISTORY:  .  Local infection of the skin and subcutaneous tissue, unspecified    TECHNIQUE:  AP, lateral and oblique views of the left foot were performed.    COMPARISON:  None    FINDINGS:  Soft tissue emphysema over the 1st digit and dorsum of the foot strongly concerning for infection.  Irregular margin of the 1st metatarsal concerning for osteomyelitis.  Correlation with MRI with contrast would be advised if clinically feasible and the patient is compatible.

## 2022-11-20 NOTE — PLAN OF CARE
Patient is in stable condition, no acute distress, remained free from injuries, receiving IV antibiotics, no pain reported this shift, blood glucose checks performed as ordered, dressings to BLE CDI with wound vac in place to L foot, VSS and all active orders reviewed. 24 hr chart check performed.

## 2022-11-20 NOTE — PLAN OF CARE
PT WAS AGREEABLE TO THERAPY    PT WITHOUT REGARD TO WEIGHT BEARING STATUS    BED MOB AND ALL T/F'S WITH S    AMBULATED WITH RW WITH WBAT ON LOWER EXTREMITY BUT WAS SUPPORT TO BE NWB ON L LOWER EXTREMITY 2 X 60' SBA WHILE WEARING BILATERAL ORTHOTIC SHOES.

## 2022-11-20 NOTE — PLAN OF CARE
Care plan reviewed with patient. Pain managed by PRN med. Wound vac dressing monitored---with minimal output. Ambulated in the room. Free from falls. No other complaints made. All orders checked and reviewed.

## 2022-11-20 NOTE — TREATMENT PLAN
RN notified provider that patient is requesting to restart his gabapentin for nerve pain he is experiencing in his lower extremities.  Will restart gabapentin 300 mg HS with 1st dose starting now.

## 2022-11-21 LAB
CREAT SERPL-MCNC: 1.3 MG/DL (ref 0.5–1.4)
EST. GFR  (NO RACE VARIABLE): >60 ML/MIN/1.73 M^2
POCT GLUCOSE: 124 MG/DL (ref 70–110)
POCT GLUCOSE: 172 MG/DL (ref 70–110)
POCT GLUCOSE: 258 MG/DL (ref 70–110)
POCT GLUCOSE: 287 MG/DL (ref 70–110)
VANCOMYCIN TROUGH SERPL-MCNC: 14.8 UG/ML (ref 10–22)

## 2022-11-21 PROCEDURE — 25000003 PHARM REV CODE 250: Performed by: NURSE PRACTITIONER

## 2022-11-21 PROCEDURE — 99024 POSTOP FOLLOW-UP VISIT: CPT | Mod: ,,, | Performed by: PODIATRIST

## 2022-11-21 PROCEDURE — 27000207 HC ISOLATION

## 2022-11-21 PROCEDURE — 97110 THERAPEUTIC EXERCISES: CPT

## 2022-11-21 PROCEDURE — 99024 PR POST-OP FOLLOW-UP VISIT: ICD-10-PCS | Mod: ,,, | Performed by: PODIATRIST

## 2022-11-21 PROCEDURE — 63600175 PHARM REV CODE 636 W HCPCS: Performed by: STUDENT IN AN ORGANIZED HEALTH CARE EDUCATION/TRAINING PROGRAM

## 2022-11-21 PROCEDURE — 63600175 PHARM REV CODE 636 W HCPCS: Performed by: INTERNAL MEDICINE

## 2022-11-21 PROCEDURE — 21400001 HC TELEMETRY ROOM

## 2022-11-21 PROCEDURE — 25000003 PHARM REV CODE 250: Performed by: PODIATRIST

## 2022-11-21 PROCEDURE — 25000003 PHARM REV CODE 250: Performed by: STUDENT IN AN ORGANIZED HEALTH CARE EDUCATION/TRAINING PROGRAM

## 2022-11-21 PROCEDURE — 82565 ASSAY OF CREATININE: CPT | Performed by: STUDENT IN AN ORGANIZED HEALTH CARE EDUCATION/TRAINING PROGRAM

## 2022-11-21 PROCEDURE — 80202 ASSAY OF VANCOMYCIN: CPT | Performed by: STUDENT IN AN ORGANIZED HEALTH CARE EDUCATION/TRAINING PROGRAM

## 2022-11-21 PROCEDURE — 97116 GAIT TRAINING THERAPY: CPT

## 2022-11-21 PROCEDURE — 63600175 PHARM REV CODE 636 W HCPCS: Performed by: PODIATRIST

## 2022-11-21 RX ADMIN — CEFTRIAXONE 2 G: 2 INJECTION, POWDER, FOR SOLUTION INTRAMUSCULAR; INTRAVENOUS at 11:11

## 2022-11-21 RX ADMIN — ENOXAPARIN SODIUM 40 MG: 40 INJECTION SUBCUTANEOUS at 04:11

## 2022-11-21 RX ADMIN — GABAPENTIN 400 MG: 400 CAPSULE ORAL at 09:11

## 2022-11-21 RX ADMIN — INSULIN ASPART 6 UNITS: 100 INJECTION, SOLUTION INTRAVENOUS; SUBCUTANEOUS at 11:11

## 2022-11-21 RX ADMIN — LOPERAMIDE HYDROCHLORIDE 2 MG: 2 CAPSULE ORAL at 08:11

## 2022-11-21 RX ADMIN — LOPERAMIDE HYDROCHLORIDE 2 MG: 2 CAPSULE ORAL at 12:11

## 2022-11-21 RX ADMIN — INSULIN ASPART 5 UNITS: 100 INJECTION, SOLUTION INTRAVENOUS; SUBCUTANEOUS at 08:11

## 2022-11-21 RX ADMIN — INSULIN DETEMIR 5 UNITS: 100 INJECTION, SOLUTION SUBCUTANEOUS at 08:11

## 2022-11-21 RX ADMIN — CEFTRIAXONE 2 G: 2 INJECTION, POWDER, FOR SOLUTION INTRAMUSCULAR; INTRAVENOUS at 12:11

## 2022-11-21 RX ADMIN — VANCOMYCIN HYDROCHLORIDE 2000 MG: 10 INJECTION, POWDER, LYOPHILIZED, FOR SOLUTION INTRAVENOUS at 04:11

## 2022-11-21 RX ADMIN — MUPIROCIN: 20 OINTMENT TOPICAL at 09:11

## 2022-11-21 RX ADMIN — AMLODIPINE BESYLATE 5 MG: 5 TABLET ORAL at 08:11

## 2022-11-21 RX ADMIN — AMITRIPTYLINE HYDROCHLORIDE 50 MG: 50 TABLET, FILM COATED ORAL at 09:11

## 2022-11-21 RX ADMIN — INSULIN ASPART 2 UNITS: 100 INJECTION, SOLUTION INTRAVENOUS; SUBCUTANEOUS at 06:11

## 2022-11-21 RX ADMIN — MUPIROCIN: 20 OINTMENT TOPICAL at 08:11

## 2022-11-21 RX ADMIN — INSULIN ASPART 5 UNITS: 100 INJECTION, SOLUTION INTRAVENOUS; SUBCUTANEOUS at 04:11

## 2022-11-21 RX ADMIN — GABAPENTIN 800 MG: 400 CAPSULE ORAL at 08:11

## 2022-11-21 RX ADMIN — MORPHINE SULFATE 2 MG: 2 INJECTION, SOLUTION INTRAMUSCULAR; INTRAVENOUS at 10:11

## 2022-11-21 RX ADMIN — LOPERAMIDE HYDROCHLORIDE 2 MG: 2 CAPSULE ORAL at 06:11

## 2022-11-21 RX ADMIN — Medication 1 TABLET: at 12:11

## 2022-11-21 RX ADMIN — INSULIN ASPART 3 UNITS: 100 INJECTION, SOLUTION INTRAVENOUS; SUBCUTANEOUS at 09:11

## 2022-11-21 RX ADMIN — INSULIN ASPART 5 UNITS: 100 INJECTION, SOLUTION INTRAVENOUS; SUBCUTANEOUS at 11:11

## 2022-11-21 RX ADMIN — Medication 1 TABLET: at 05:11

## 2022-11-21 RX ADMIN — INSULIN DETEMIR 20 UNITS: 100 INJECTION, SOLUTION SUBCUTANEOUS at 09:11

## 2022-11-21 RX ADMIN — Medication 1 TABLET: at 08:11

## 2022-11-21 NOTE — CONSULTS
Elker sent referral to [facility], pending med cost and setup.      Update: Elker spoke with jewel at [facility], med cost for plan A is 675.26/week for Plan A and 578.42/week for Plan B. Patient stated he can not afford that medication cost. Swer notified provider.   11/21/2022   3:47PM

## 2022-11-21 NOTE — ASSESSMENT & PLAN NOTE
- Continue Rocephin per ID   --ID following, recommending IV Daptomycin + Rocephin x 6 weeks  --ambulatory infusion orders placed for abx and wound vac

## 2022-11-21 NOTE — PLAN OF CARE
PT GT TRAINED X 60' WITH RW AND ONE STAND REST BREAK WITH MULT CUES TO MAINTAIN NWB L LE PRECAUTIONS.

## 2022-11-21 NOTE — PT/OT/SLP PROGRESS
"Physical Therapy  Treatment    Joseph Paris Jr.   MRN: [id number]   Admitting Diagnosis: Bacteremia due to group B Streptococcus    PT Received On: 11/21/22  PT Start Time: 0945     PT Stop Time: 1008    PT Total Time (min): 23 min       Billable Minutes:  Gait Training 13 and Therapeutic Exercise 10    Treatment Type: Treatment  PT/PTA: PT     PTA Visit Number: 0       General Precautions: Standard, fall  Orthopedic Precautions: LLE non weight bearing   Braces:  (SX SHOES)  Respiratory Status: Room air    Spiritual, Cultural Beliefs, Orthodoxy Practices, Values that Affect Care: no    Subjective:  Communicated with NURSE YAÑEZ AND [facility] CHART REVIEW  prior to session.   PT AGREED TO TX     Pain/Comfort  Pain Rating 1: 0/10  Pain Rating Post-Intervention 1: 0/10    Objective:   Patient found with: peripheral IV, wound vac    Functional Mobility:  PT MET IN RM SEATED IN CHAIR. PT STOOD WITH RW AND CUES FOR NWB L LE AS PT NOT COMPLIANT WITH NWB. PT REPORTED USING SPC TO MOVE AROUND ROOM. P.T. EDUCATED PT ON NEED TO USE RW TO COMPLETELY OFF WT L LE FOR NWB. PT REPORTED UNDERSTANDING AND FOLLOWED NWB WITH GT X 60' WITH CGA AND RW WITH ONE STAND REST BREAK. PT RETURNED TO RM T/F TO CHAIR WITH RW AND CGA. PT SEATED FOR REST BREAK.   Treatment and Education:  PT COMPLETED B LE TE X AP, TKE, AND MIP X 15 REPS FOR LE ROM AND STRENGTHENING. PT EDUCATED TO COMPLETE TE MULT X PER DAY AND PT AGREED. PT EDUCATED ON RISK FOR FALLS DUE TO GENERALIZED WEAKNESS, EDUCATED ON "CALL DON'T FALL", ENCOURAGED TO CALL FOR ASSISTANCE WITH ALL NEEDS SUCH AS BED<>CHAIR TRANSFERS OR TRIPS TO BATHROOM. PT LEFT SEATED IN CHAIR WITH ALL NEEDS MET.        AM-PAC 6 CLICK MOBILITY  How much help from another person does this patient currently need?   1 = Unable, Total/Dependent Assistance  2 = A lot, Maximum/Moderate Assistance  3 = A little, Minimum/Contact Guard/Supervision  4 = None, Modified [place]/Independent    Turning over in bed (including " adjusting bedclothes, sheets and blankets)?: 1 (NT)  Sitting down on and standing up from a chair with arms (e.g., wheelchair, bedside commode, etc.): 3  Moving from lying on back to sitting on the side of the bed?: 3  Moving to and from a bed to a chair (including a wheelchair)?: 3  Need to walk in hospital room?: 3  Climbing 3-5 steps with a railing?: 1 (NT)  Basic Mobility Total Score: 14    AM-PAC Raw Score CMS G-Code Modifier Level of Impairment Assistance   6 % Total / Unable   7 - 9 CM 80 - 100% Maximal Assist   10 - 14 CL 60 - 80% Moderate Assist   15 - 19 CK 40 - 60% Moderate Assist   20 - 22 CJ 20 - 40% Minimal Assist   23 CI 1-20% SBA / CGA   24 CH 0% Independent/ Mod I     Patient left up in chair with all lines intact.    Assessment:  PT OBDULIO TX WITH INC CUES FOR NWB L LE. PT NOT COMPLIANT WITH NWB OF L LE AND WILL BENEFIT FROM CONT TX FOR SAFE PROGRESS WITH MOBILITY.     Rehab identified problem list/impairments: Rehab identified problem list/impairments: weakness, gait instability, impaired balance, impaired endurance, impaired functional mobility, decreased safety awareness, decreased lower extremity function, decreased ROM, other (comment) (L LE NWB)    Rehab potential is good.    Activity tolerance: Fair    Discharge recommendations: Discharge Facility/Level of Care Needs: home health PT     Barriers to discharge:      Equipment recommendations: Equipment Needed After Discharge: walker, rolling     GOALS:   Multidisciplinary Problems       Physical Therapy Goals          Problem: Physical Therapy    Goal Priority Disciplines Outcome Goal Variances Interventions   Physical Therapy Goal     PT, PT/OT Ongoing, Progressing     Description: Pt will perform bed mobility independently in order to participate in EOB activity.  Pt will perform transfers independently in order to participate in OOB activity.   Pt will ambulate 100ft mod I with LRAD in order to participate in daily tasks adhering to  NWB status to LLE                       PLAN:    Patient to be seen 3 x/week  to address the above listed problems via gait training, therapeutic activities, therapeutic exercises  Plan of Care expires: 12/01/22  Plan of Care reviewed with: patient         11/21/2022

## 2022-11-21 NOTE — ASSESSMENT & PLAN NOTE
-s/p I&D on 11/13  -BC (11/13) grew group B strep- repeat cultures (11/14) no growth to date   -MRI of L FOOT: Large ulceration extending along 1st metatarsal shaft to 1st proximal phalanx. Abnormal bone marrow signal enhancement compatible with osteomyelitis.   -Podiatry rec angiogram of left leg- vascular surgery consulted -Angiogram of bilateral LE's on 11/15 with Dr Berman. -successful recannulization of R femoral   -s/p  debridement and wound vac placement on 11/16 with Podiatry Dr. Augustine   - ID consulted; rec IV Dapto and Rocephin x 6 weeks EOT 12/29/22  - discussed with case management- unable to obtain wound vac or home IV abx at this time as pt uninsured, medicaid application pending

## 2022-11-21 NOTE — PLAN OF CARE
Fall precautions maintained. Call bell and personal items within reach. VSS. Pain controlled with scheduled medication. Glucose monitoring continued. Wound vac seal intact. IV antibiotics administered.Chart check complete.    Problem: Adult Inpatient Plan of Care  Goal: Plan of Care Review  Outcome: Ongoing, Progressing     Problem: Adult Inpatient Plan of Care  Goal: Absence of Hospital-Acquired Illness or Injury  Outcome: Ongoing, Progressing

## 2022-11-21 NOTE — PLAN OF CARE
Cm met with patient at the bedside to discuss Status of medicaid conchita. Per patient he turned in legible check stubs on Friday. Swer to follow up with [facility] team on conchita status.

## 2022-11-21 NOTE — PLAN OF CARE
Recommendations  1) Add Dean BID for wound healing   2) Add Boost Glucose Control TID for additional protein/calories to promote wound healing  3) Continue Diabetic diet- 2800 kcal    Goals: Pt to continue to meet > 75% EEN by RD follow up  Nutrition Goal Status: new  Communication of RD Recs: other (comment) (POC, sticky note)    Assessment and Plan      Nutrition Problem  Increased nutrient needs    Related to (etiology):   Wound healing    Signs and Symptoms (as evidenced by):   gangrene to foot and decubitus ulcer R heel. Wound vac in place, s/p debridement    Interventions(treatment strategy):  Collaboration with other providers  Carbohydrate modified diet (diabetic)    Nutrition Diagnosis Status:   New

## 2022-11-21 NOTE — PROGRESS NOTES
[facility] Surg  Podiatry  Progress Note    Patient Name: Joseph Paris Jr.  MRN: [id number]  Admission Date: 11/12/2022  Hospital Length of Stay: 8 days  Attending Physician: Sarika Madrid MD  Primary Care Provider: Thong Hidalgo Jr, MD     Subjective:     Interval History: WoundVac application to the LLE at present. States no pains to the LE. States WB and gait. Eager to D/C.     Follow-up For: Procedure(s) (LRB):  INCISION AND DRAINAGE, FOOT (Left)  APPLICATION, WOUND VAC (Left)    Post-Operative Day: 4 Days Post-Op    Scheduled Meds:   amitriptyline  50 mg Oral QHS    amLODIPine  5 mg Oral Daily    cefTRIAXone (ROCEPHIN) IVPB  2 g Intravenous Q24H    enoxaparin  40 mg Subcutaneous Daily    gabapentin  400 mg Oral QHS    gabapentin  800 mg Oral Daily    insulin aspart U-100  5 Units Subcutaneous TIDWM    insulin detemir U-100  20 Units Subcutaneous QHS    insulin detemir U-100  5 Units Subcutaneous Daily    Lactobacillus acidoph-L.bulgar  1 tablet Oral TID WM    mupirocin   Nasal BID    vancomycin (VANCOCIN) IVPB  2,000 mg Intravenous Q24H     Continuous Infusions:  PRN Meds:sodium chloride 0.9%, acetaminophen, acetaminophen, dextrose 10%, dextrose 10%, glucagon (human recombinant), glucose, glucose, hydrALAZINE, insulin aspart U-100, loperamide, morphine, naloxone, ondansetron, ondansetron, oxyCODONE, sodium chloride 0.9%, sodium chloride 0.9%, Pharmacy to dose Vancomycin consult **AND** vancomycin - pharmacy to dose    Review of Systems   Constitutional:  Negative for chills, fatigue and fever.   HENT:  Negative for hearing loss.    Eyes:  Negative for photophobia and visual disturbance.   Respiratory:  Negative for cough, chest tightness, shortness of breath and wheezing.    Cardiovascular:  Positive for leg swelling. Negative for chest pain and palpitations.   Gastrointestinal:  Negative for constipation, diarrhea, nausea and vomiting.   Endocrine: Negative for cold intolerance and heat intolerance.    Genitourinary:  Negative for flank pain.   Musculoskeletal:  Positive for gait problem. Negative for neck pain and neck stiffness.   Skin:  Positive for color change and wound.   Neurological:  Positive for numbness. Negative for light-headedness and headaches.   Psychiatric/Behavioral:  Negative for sleep disturbance.    Objective:     Vital Signs (Most Recent):  Temp: 96.3 °F (35.7 °C) (11/21/22 1128)  Pulse: 75 (11/21/22 1128)  Resp: 18 (11/21/22 1128)  BP: 127/68 (11/21/22 1128)  SpO2: 99 % (11/21/22 1128) Vital Signs (24h Range):  Temp:  [96.3 °F (35.7 °C)-98.8 °F (37.1 °C)] 96.3 °F (35.7 °C)  Pulse:  [75-83] 75  Resp:  [15-18] 18  SpO2:  [95 %-100 %] 99 %  BP: (111-168)/(60-79) 127/68     Weight: 104.8 kg (231 lb 0.7 oz)  Body mass index is 28.88 kg/m².    Foot Exam    Laboratory:  All pertinent labs reviewed within the last 24 hours.    Diagnostic Results:  I have reviewed all pertinent imaging results/findings within the past 24 hours.    Clinical Findings:  Dressings to the B/L LE are clean, dry and intact. No malodor or drainage is noted.     Assessment/Plan:     Active Diagnoses:    Diagnosis Date Noted POA    PRINCIPAL PROBLEM:  Bacteremia due to group B Streptococcus [R78.81, B95.1] 11/15/2022 Yes    Bacteremia due to methicillin resistant Staphylococcus aureus [R78.81, B95.62] 11/17/2022 Yes    Decubitus ulcer of right heel [L89.619] 11/14/2022 Yes    Gas gangrene of foot [A48.0] 11/13/2022 Yes    Acute osteomyelitis of metatarsal bone of left foot [M86.172] 11/12/2022 Yes    Severe sepsis [A41.9, R65.20] 11/12/2022 Yes    TIERA (acute kidney injury) [N17.9] 11/12/2022 Yes    Diabetes mellitus [E11.9]  Yes      Problems Resolved During this Admission:    Diagnosis Date Noted Date Resolved POA    Abscess of foot including toes, left [L02.612] 11/13/2022 11/13/2022 Yes     WoundVac application to the LLE at present.    Orders are placed for Woundcare to swap on tomorrow.    Orders are also placed for LLE  wound care.    PICC Line for IV Abx.    WBAT with Sx. Shoes.    Out-patient [facility] follow up at [facility].       Jovan Morrow DPM  Podiatry  [facility]

## 2022-11-21 NOTE — ASSESSMENT & PLAN NOTE
--continue basal insulin at 20 units QHS, 5u QAM  --SSI, prandial insulin therapy-aspart 5 TIDWM  --Accuchecks ACQHS  - BG goal 140-180 for optimal wound healing

## 2022-11-21 NOTE — PROGRESS NOTES
[facility] Medicine  Progress Note    Patient Name: Joseph Paris Jr.  MRN: [id number]  Patient Class: IP- Inpatient   Admission Date: 11/12/2022  Length of Stay: 8 days  Attending Physician: Sarika Madrid MD  Primary Care Provider: Thong Hidalgo Jr, MD        Subjective:     Principal Problem:Bacteremia due to group B Streptococcus        HPI:  52 y.o. male patient with a PMHx of HTN, DM, and CAD presents to the Emergency Department for evaluation of Bilateral Feet Swelling which onset gradually within the past week. The pt last saw their wound care three weeks ago. The pt lost his health insurance coverage and decided to take care of the wounds himself. The pt self drained pus from his wounds and used a heated water massager on his feet; reports feet look more infected than before. Symptoms are constant and moderate in severity. No mitigating or exacerbating factors reported. Associated sxs include fever and SOB. Patient denies any CP, N/V, weakness, dysuria, and all other sxs at this time      Overview/Hospital Course:  S/p I&D of foot per Podiatry, recommended MRI and angio with possible referral to vascular surgery. Concerns for viability of flap due to appearance of wounds. Will likely need to return to the OR later this week. Wound cultures collected during procedure, Blood culture: gram positive cocci in chains in both bottles, adjusted antibiotic regimen, added vancomycin. On 11/15- Blood cultures ultimately grew MDR group B strep, but sensitive to both Rocephin and Vancomycin. Staphylococcus speciated as well in blood, but sensitivities still pending. Angiogram ordered by Vascular surgery tentatively scheduled to be done on 11/15. Cannulization in R femoral artery successful. Patient reports notable improvement in his limb swelling and pain immediately after procedure. Further debridement tentatively scheduled for 11/17. Tolerated procedure well. Wound vac placed at that time as  well. PICC line inserted on 11/18/22. Definitive antibiotic recs placed by ID (see their note for specifics). Total duration of therapy 6 weeks.  Surgery plans to takedown Wound vac and reassess wound on Monday 11/21/2022.       Interval History: NAEON. Pt reports some tingling to LLE but no pain. Is planned for wound vac change today. Denies CP, SOB, abd pain, n/v. Voiding without difficulty.     Review of Systems  Objective:     Vital Signs (Most Recent):  Temp: 96.3 °F (35.7 °C) (11/21/22 1128)  Pulse: 75 (11/21/22 1128)  Resp: 18 (11/21/22 1128)  BP: 127/68 (11/21/22 1128)  SpO2: 99 % (11/21/22 1128) Vital Signs (24h Range):  Temp:  [96.3 °F (35.7 °C)-98.8 °F (37.1 °C)] 96.3 °F (35.7 °C)  Pulse:  [75-83] 75  Resp:  [15-18] 18  SpO2:  [95 %-100 %] 99 %  BP: (111-168)/(60-79) 127/68     Weight: 104.8 kg (231 lb 0.7 oz)  Body mass index is 28.88 kg/m².    Intake/Output Summary (Last 24 hours) at 11/21/2022 1419  Last data filed at 11/21/2022 1237  Gross per 24 hour   Intake 1212.94 ml   Output 25 ml   Net 1187.94 ml      Physical Exam  Vitals and nursing note reviewed.   Constitutional:       General: He is not in acute distress.     Appearance: Normal appearance.   Cardiovascular:      Rate and Rhythm: Normal rate and regular rhythm.      Heart sounds: No murmur heard.    No friction rub. No gallop.   Pulmonary:      Effort: Pulmonary effort is normal.      Breath sounds: Normal breath sounds. No wheezing, rhonchi or rales.   Abdominal:      General: Bowel sounds are normal. There is no distension.      Palpations: Abdomen is soft.      Tenderness: There is no abdominal tenderness. There is no guarding or rebound.   Musculoskeletal:      Comments: L foot ACE dressing and wound vac in place, appears CDI    Neurological:      Mental Status: He is alert and oriented to person, place, and time. Mental status is at baseline.       Significant Labs: All pertinent labs within the past 24 hours have been  reviewed.    Significant Imaging: I have reviewed all pertinent imaging results/findings within the past 24 hours.      Assessment/Plan:      * Bacteremia due to group B Streptococcus  - Continue Rocephin per ID   --ID following, recommending IV Daptomycin + Rocephin x 6 weeks  --ambulatory infusion orders placed for abx and wound vac     Bacteremia due to methicillin resistant Staphylococcus aureus  - Continue IV vanc per ID; dosing and monitoring of Vanc per pharmacy       Decubitus ulcer of right heel  --wound care consulted and following  - continue local wound care     Diabetes mellitus  --continue basal insulin at 20 units QHS, 5u QAM  --SSI, prandial insulin therapy-aspart 5 TIDWM  --Accuchecks ACQHS  - BG goal 140-180 for optimal wound healing     TIERA (acute kidney injury)  - Cr improved with IV fluids  - monitor BMP intermittently      Acute osteomyelitis of metatarsal bone of left foot  -s/p I&D on 11/13  -BC (11/13) grew group B strep- repeat cultures (11/14) no growth to date   -MRI of L FOOT: Large ulceration extending along 1st metatarsal shaft to 1st proximal phalanx. Abnormal bone marrow signal enhancement compatible with osteomyelitis.   -Podiatry rec angiogram of left leg- vascular surgery consulted -Angiogram of bilateral LE's on 11/15 with Dr Berman. -successful recannulization of R femoral   -s/p  debridement and wound vac placement on 11/16 with Podiatry Dr. Augustine   - ID consulted; rec IV Dapto and Rocephin x 6 weeks EOT 12/29/22  - discussed with case management- unable to obtain wound vac or home IV abx at this time as pt uninsured, medicaid application pending         VTE Risk Mitigation (From admission, onward)         Ordered     enoxaparin injection 40 mg  Daily         11/12/22 2232     IP VTE HIGH RISK PATIENT  Once         11/12/22 2232     Place sequential compression device  Until discontinued         11/12/22 2232                Discharge Planning   KIT:      Code Status: Full  Code   Is the patient medically ready for discharge?:     Reason for patient still in hospital (select all that apply): Pending disposition  Discharge Plan A: Home                  Sarika Madrid MD  Department of Hospital Medicine   [facility] Surg

## 2022-11-21 NOTE — SUBJECTIVE & OBJECTIVE
Interval History: NAEON. Pt reports some tingling to LLE but no pain. Is planned for wound vac change today. Denies CP, SOB, abd pain, n/v. Voiding without difficulty.     Review of Systems  Objective:     Vital Signs (Most Recent):  Temp: 96.3 °F (35.7 °C) (11/21/22 1128)  Pulse: 75 (11/21/22 1128)  Resp: 18 (11/21/22 1128)  BP: 127/68 (11/21/22 1128)  SpO2: 99 % (11/21/22 1128) Vital Signs (24h Range):  Temp:  [96.3 °F (35.7 °C)-98.8 °F (37.1 °C)] 96.3 °F (35.7 °C)  Pulse:  [75-83] 75  Resp:  [15-18] 18  SpO2:  [95 %-100 %] 99 %  BP: (111-168)/(60-79) 127/68     Weight: 104.8 kg (231 lb 0.7 oz)  Body mass index is 28.88 kg/m².    Intake/Output Summary (Last 24 hours) at 11/21/2022 1419  Last data filed at 11/21/2022 1237  Gross per 24 hour   Intake 1212.94 ml   Output 25 ml   Net 1187.94 ml      Physical Exam  Vitals and nursing note reviewed.   Constitutional:       General: He is not in acute distress.     Appearance: Normal appearance.   Cardiovascular:      Rate and Rhythm: Normal rate and regular rhythm.      Heart sounds: No murmur heard.    No friction rub. No gallop.   Pulmonary:      Effort: Pulmonary effort is normal.      Breath sounds: Normal breath sounds. No wheezing, rhonchi or rales.   Abdominal:      General: Bowel sounds are normal. There is no distension.      Palpations: Abdomen is soft.      Tenderness: There is no abdominal tenderness. There is no guarding or rebound.   Musculoskeletal:      Comments: L foot ACE dressing and wound vac in place, appears CDI    Neurological:      Mental Status: He is alert and oriented to person, place, and time. Mental status is at baseline.       Significant Labs: All pertinent labs within the past 24 hours have been reviewed.    Significant Imaging: I have reviewed all pertinent imaging results/findings within the past 24 hours.

## 2022-11-21 NOTE — CONSULTS
11/21/22 1056   WOCN Assessment   WOCN Total Time (mins) 90   Visit Date 11/21/22   Visit Time 1050   Consult Type New   WOCN Speciality Wound   WOCN List wound vac   Wound I&D   Procedure wound vac   Intervention assessed;changed   Teaching on-going   Skin Interventions   Device Skin Pressure Protection absorbent pad utilized/changed        Altered Skin Integrity Right Heel   No Date First Assessed or Time First Assessed found.   Altered Skin Integrity Present on Admission: yes  Side: Right  Location: Heel   Description of Altered Skin Integrity Full thickness tissue loss. Base is covered by slough and/or eschar in the wound bed   Dressing Appearance Intact;Dry;Clean   Drainage Amount None   Drainage Characteristics/Odor No odor   Appearance Eschar   Tissue loss description Full thickness   Black (%), Wound Tissue Color 100 %   Red (%), Wound Tissue Color 0 %   Yellow (%), Wound Tissue Color 0 %   Periwound Area Dry   Wound Length (cm) 5 cm   Wound Width (cm) 5 cm   Wound Surface Area (cm^2) 25 cm^2   Care Applied:;Povidone iodine   Dressing Gauze;Elastic bandage  (per patient request/pt states he picks at feet)   Dressing Change Due 11/22/22        Incision/Site 11/17/22 0736 Left Foot   Date First Assessed/Time First Assessed: 11/17/22 0736   Side: Left  Location: Foot   Wound Image     Dressing Appearance Intact   Drainage Amount Scant   Drainage Characteristics/Odor Sanguineous   Appearance Pink;Red;Tendon   Black (%), Wound Tissue Color 0 %   Wound Length (cm) 12 cm   Wound Width (cm) 8 cm   Wound Depth (cm) 2 cm   Wound Volume (cm^3) 192 cm^3   Wound Surface Area (cm^2) 96 cm^2   Care Cleansed with:;Sterile normal saline   Dressing Changed;Other (comment)  (wound vac)        Negative Pressure Wound Therapy  11/17/22 0805 Left anterior   Placement Date/Time: 11/17/22 0805   Side: Left  Orientation: anterior  Location: Foot   NPWT Type Vacuum Therapy   Therapy Setting NPWT Continuous therapy   Pressure  Setting NPWT 125 mmHg   Sponges Inserted NPWT White;Black;1;Other  (1 white foam on top of tendon and 1 black foam on top of rest)   Sponges Removed NPWT Black;White;1   Both feet covered with ace wrap per patient request to keep him from picking at feet and wounds. Patient states if he sees it he will pick at it.

## 2022-11-21 NOTE — PROGRESS NOTES
"[facility] - Med Surg  Adult Nutrition  Progress Note    SUMMARY     Recommendations  1) Add Dean BID for wound healing   2) Add Boost Glucose Control TID for additional protein/calories to promote wound healing  3) Continue Diabetic diet- 2800 kcal    Goals: Pt to continue to meet > 75% EEN by RD follow up  Nutrition Goal Status: new  Communication of RD Recs: other (comment) (POC, sticky note)    Assessment and Plan      Nutrition Problem  Increased nutrient needs    Related to (etiology):   Wound healing    Signs and Symptoms (as evidenced by):   gangrene to foot and decubitus ulcer R heel. Wound vac in place, s/p debridement    Interventions(treatment strategy):  Collaboration with other providers  Carbohydrate modified diet (diabetic)    Nutrition Diagnosis Status:   New      Malnutrition Assessment   JOSE C NFPE due to remote assessment  15 lbs wt loss (6%) during admit, significant    Reason for Assessment  Reason For Assessment: length of stay  Diagnosis: other (see comments) (bacteremia)  Relevant Medical History: HTN, DM, and CAD, esophageal cancer  Interdisciplinary Rounds: did not attend  General Information Comments: RD remote for coverage, called pt on room phone- no answer. Pt LOS day 8. Per chart % intake at meals. Pt with gangrene to foot and decubitus ulcer R heel. Wound vac in place, s/p debridement. POC -254. LBM 11/21. No recent wt history on file. 6% wt loss (15 lbs) during admit, significant. Pt with increased nutrient needs.   Nutrition Discharge Planning: Discharge on diabetic/cardiac diet with increased protein to promote wound healing    Nutrition Risk Screen  Nutrition Risk Screen: no indicators present    Nutrition/Diet History  Spiritual, Cultural Beliefs, Mosque Practices, Values that Affect Care: no  Factors Affecting Nutritional Intake: None identified at this time    Anthropometrics  Temp: 98.2 °F (36.8 °C)  Height Method: Stated  Height: 6' 3" (190.5 cm)  Height (inches): " 75 in  Weight Method: Bed Scale  Weight: 104.8 kg (231 lb 0.7 oz)  Weight (lb): 231.04 lb  Ideal Body Weight (IBW), Male: 196 lb  % Ideal Body Weight, Male (lb): 125.98 %  BMI (Calculated): 28.9       Lab/Procedures/Meds  Pertinent Labs Reviewed: reviewed  Pertinent Labs Comments: POC -254, Na 133, Cl 94, Hgb A1c 10.8 on 7/26/22  Pertinent Medications Reviewed: reviewed  Pertinent Medications Comments: amlodipine, gabapentin, insulin, lactobacillus-acidoph- bulgar    Estimated/Assessed Needs  Weight Used For Calorie Calculations: 104.8 kg (231 lb)  Energy Calorie Requirements (kcal): 2565 kcal/day based on MSJ x 1.3 AF  Energy Need Method: Winston-St Jeor  Protein Requirements: 126-147 g/day based on 1.2-1.4 g/kg for wound healing  Weight Used For Protein Calculations: 104.8 kg (231 lb)  Fluid Requirements (mL): 1 mL/kcal or per MD  Estimated Fluid Requirement Method: RDA Method  RDA Method (mL): 2565  CHO Requirement: 320 g CHO/day based on 50% kcal from CHO    Nutrition Prescription Ordered  Current Diet Order: Diabetic diet - 2800 kcal    Evaluation of Received Nutrient/Fluid Intake    Intake/Output Summary (Last 24 hours) at 11/21/2022 0958  Last data filed at 11/21/2022 0838  Gross per 24 hour   Intake 932.94 ml   Output 25 ml   Net 907.94 ml     I/O: +5 L since admit  Energy Calories Required: meeting needs  Protein Required: meeting needs  Fluid Required: meeting needs  Comments: LBM 11/21  Tolerance: tolerating  % Intake of Estimated Energy Needs: 75 - 100 %  % Meal Intake: 75 - 100 %    Nutrition Risk  Level of Risk/Frequency of Follow-up: low (1x weekly)     Monitor and Evaluation  Food and Nutrient Intake: energy intake, food and beverage intake  Food and Nutrient Adminstration: diet order  Knowledge/Beliefs/Attitudes: beliefs and attitudes  Physical Activity and Function: nutrition-related ADLs and IADLs  Anthropometric Measurements: weight change, weight, body mass index  Biochemical Data, Medical  Tests and Procedures: gastrointestinal profile, glucose/endocrine profile, inflammatory profile, electrolyte and renal panel, lipid profile  Nutrition-Focused Physical Findings: overall appearance, extremities, muscles and bones, skin     Nutrition Follow-Up  RD Follow-up?: Yes

## 2022-11-21 NOTE — PLAN OF CARE
[facility] - Med Surg  Discharge Reassessment    Primary Care Provider: Thong Hidalgo Jr, MD    Expected Discharge Date:     Reassessment (most recent)       Discharge Reassessment - 11/21/22 1510          Discharge Reassessment    Assessment Type Discharge Planning Reassessment     Did the patient's condition or plan change since previous assessment? No     Communicated KIT with patient/caregiver Date not available/Unable to determine     Discharge Plan A Home     Discharge Plan B Home     DME Needed Upon Discharge  negative pressure wound therapy device     Discharge Barriers Identified Unisured     Why the patient remains in the hospital Requires continued medical care;Insurance issues        Post-Acute Status    Discharge Delays None known at this time

## 2022-11-22 PROBLEM — R19.7 DIARRHEA: Status: ACTIVE | Noted: 2022-11-22

## 2022-11-22 LAB
ANION GAP SERPL CALC-SCNC: 8 MMOL/L (ref 8–16)
BASOPHILS # BLD AUTO: 0.04 K/UL (ref 0–0.2)
BASOPHILS NFR BLD: 0.5 % (ref 0–1.9)
BUN SERPL-MCNC: 10 MG/DL (ref 6–20)
CALCIUM SERPL-MCNC: 7.9 MG/DL (ref 8.7–10.5)
CHLORIDE SERPL-SCNC: 101 MMOL/L (ref 95–110)
CO2 SERPL-SCNC: 24 MMOL/L (ref 23–29)
CREAT SERPL-MCNC: 1.5 MG/DL (ref 0.5–1.4)
CREAT SERPL-MCNC: 1.5 MG/DL (ref 0.5–1.4)
DIFFERENTIAL METHOD: ABNORMAL
EOSINOPHIL # BLD AUTO: 0.2 K/UL (ref 0–0.5)
EOSINOPHIL NFR BLD: 2.6 % (ref 0–8)
ERYTHROCYTE [DISTWIDTH] IN BLOOD BY AUTOMATED COUNT: 12.5 % (ref 11.5–14.5)
EST. GFR  (NO RACE VARIABLE): 55 ML/MIN/1.73 M^2
EST. GFR  (NO RACE VARIABLE): 55 ML/MIN/1.73 M^2
GLUCOSE SERPL-MCNC: 247 MG/DL (ref 70–110)
HCT VFR BLD AUTO: 22.1 % (ref 40–54)
HGB BLD-MCNC: 7 G/DL (ref 14–18)
IMM GRANULOCYTES # BLD AUTO: 0.11 K/UL (ref 0–0.04)
IMM GRANULOCYTES NFR BLD AUTO: 1.4 % (ref 0–0.5)
LYMPHOCYTES # BLD AUTO: 2.1 K/UL (ref 1–4.8)
LYMPHOCYTES NFR BLD: 27.4 % (ref 18–48)
MCH RBC QN AUTO: 31.7 PG (ref 27–31)
MCHC RBC AUTO-ENTMCNC: 31.7 G/DL (ref 32–36)
MCV RBC AUTO: 100 FL (ref 82–98)
MONOCYTES # BLD AUTO: 0.7 K/UL (ref 0.3–1)
MONOCYTES NFR BLD: 8.7 % (ref 4–15)
NEUTROPHILS # BLD AUTO: 4.7 K/UL (ref 1.8–7.7)
NEUTROPHILS NFR BLD: 59.4 % (ref 38–73)
NRBC BLD-RTO: 0 /100 WBC
PLATELET # BLD AUTO: 425 K/UL (ref 150–450)
PMV BLD AUTO: 9.3 FL (ref 9.2–12.9)
POCT GLUCOSE: 228 MG/DL (ref 70–110)
POCT GLUCOSE: 241 MG/DL (ref 70–110)
POCT GLUCOSE: 271 MG/DL (ref 70–110)
POCT GLUCOSE: 289 MG/DL (ref 70–110)
POCT GLUCOSE: 298 MG/DL (ref 70–110)
POTASSIUM SERPL-SCNC: 4 MMOL/L (ref 3.5–5.1)
RBC # BLD AUTO: 2.21 M/UL (ref 4.6–6.2)
SODIUM SERPL-SCNC: 133 MMOL/L (ref 136–145)
WBC # BLD AUTO: 7.82 K/UL (ref 3.9–12.7)

## 2022-11-22 PROCEDURE — 80048 BASIC METABOLIC PNL TOTAL CA: CPT | Performed by: INTERNAL MEDICINE

## 2022-11-22 PROCEDURE — 21400001 HC TELEMETRY ROOM

## 2022-11-22 PROCEDURE — 25000003 PHARM REV CODE 250: Performed by: PODIATRIST

## 2022-11-22 PROCEDURE — 27000207 HC ISOLATION

## 2022-11-22 PROCEDURE — 97116 GAIT TRAINING THERAPY: CPT

## 2022-11-22 PROCEDURE — 25000003 PHARM REV CODE 250: Performed by: INTERNAL MEDICINE

## 2022-11-22 PROCEDURE — 85025 COMPLETE CBC W/AUTO DIFF WBC: CPT | Performed by: INTERNAL MEDICINE

## 2022-11-22 PROCEDURE — 63600175 PHARM REV CODE 636 W HCPCS: Performed by: STUDENT IN AN ORGANIZED HEALTH CARE EDUCATION/TRAINING PROGRAM

## 2022-11-22 PROCEDURE — 63600175 PHARM REV CODE 636 W HCPCS: Performed by: PODIATRIST

## 2022-11-22 PROCEDURE — 97110 THERAPEUTIC EXERCISES: CPT

## 2022-11-22 PROCEDURE — 25000003 PHARM REV CODE 250: Performed by: STUDENT IN AN ORGANIZED HEALTH CARE EDUCATION/TRAINING PROGRAM

## 2022-11-22 PROCEDURE — 25000003 PHARM REV CODE 250: Performed by: NURSE PRACTITIONER

## 2022-11-22 PROCEDURE — 63600175 PHARM REV CODE 636 W HCPCS: Performed by: INTERNAL MEDICINE

## 2022-11-22 RX ORDER — MORPHINE SULFATE 2 MG/ML
2 INJECTION, SOLUTION INTRAMUSCULAR; INTRAVENOUS ONCE AS NEEDED
Status: COMPLETED | OUTPATIENT
Start: 2022-11-22 | End: 2022-11-25

## 2022-11-22 RX ORDER — PRAVASTATIN SODIUM 20 MG/1
20 TABLET ORAL DAILY
Status: DISCONTINUED | OUTPATIENT
Start: 2022-11-22 | End: 2022-11-29 | Stop reason: HOSPADM

## 2022-11-22 RX ORDER — PANTOPRAZOLE SODIUM 40 MG/1
40 TABLET, DELAYED RELEASE ORAL DAILY
Status: DISCONTINUED | OUTPATIENT
Start: 2022-11-22 | End: 2022-11-29 | Stop reason: HOSPADM

## 2022-11-22 RX ADMIN — INSULIN ASPART 3 UNITS: 100 INJECTION, SOLUTION INTRAVENOUS; SUBCUTANEOUS at 09:11

## 2022-11-22 RX ADMIN — INSULIN ASPART 5 UNITS: 100 INJECTION, SOLUTION INTRAVENOUS; SUBCUTANEOUS at 12:11

## 2022-11-22 RX ADMIN — INSULIN ASPART 6 UNITS: 100 INJECTION, SOLUTION INTRAVENOUS; SUBCUTANEOUS at 05:11

## 2022-11-22 RX ADMIN — PANTOPRAZOLE SODIUM 40 MG: 40 TABLET, DELAYED RELEASE ORAL at 08:11

## 2022-11-22 RX ADMIN — ACETAMINOPHEN 650 MG: 325 TABLET ORAL at 11:11

## 2022-11-22 RX ADMIN — ENOXAPARIN SODIUM 40 MG: 40 INJECTION SUBCUTANEOUS at 05:11

## 2022-11-22 RX ADMIN — GABAPENTIN 400 MG: 400 CAPSULE ORAL at 09:11

## 2022-11-22 RX ADMIN — Medication 1 TABLET: at 12:11

## 2022-11-22 RX ADMIN — AMITRIPTYLINE HYDROCHLORIDE 50 MG: 50 TABLET, FILM COATED ORAL at 09:11

## 2022-11-22 RX ADMIN — INSULIN ASPART 4 UNITS: 100 INJECTION, SOLUTION INTRAVENOUS; SUBCUTANEOUS at 05:11

## 2022-11-22 RX ADMIN — AMLODIPINE BESYLATE 5 MG: 5 TABLET ORAL at 08:11

## 2022-11-22 RX ADMIN — INSULIN ASPART 5 UNITS: 100 INJECTION, SOLUTION INTRAVENOUS; SUBCUTANEOUS at 05:11

## 2022-11-22 RX ADMIN — Medication 1 TABLET: at 05:11

## 2022-11-22 RX ADMIN — VANCOMYCIN HYDROCHLORIDE 2000 MG: 10 INJECTION, POWDER, LYOPHILIZED, FOR SOLUTION INTRAVENOUS at 03:11

## 2022-11-22 RX ADMIN — INSULIN ASPART 6 UNITS: 100 INJECTION, SOLUTION INTRAVENOUS; SUBCUTANEOUS at 12:11

## 2022-11-22 RX ADMIN — PRAVASTATIN SODIUM 20 MG: 20 TABLET ORAL at 08:11

## 2022-11-22 RX ADMIN — Medication 1 TABLET: at 08:11

## 2022-11-22 RX ADMIN — INSULIN DETEMIR 5 UNITS: 100 INJECTION, SOLUTION SUBCUTANEOUS at 08:11

## 2022-11-22 RX ADMIN — LOPERAMIDE HYDROCHLORIDE 2 MG: 2 CAPSULE ORAL at 05:11

## 2022-11-22 RX ADMIN — INSULIN ASPART 5 UNITS: 100 INJECTION, SOLUTION INTRAVENOUS; SUBCUTANEOUS at 08:11

## 2022-11-22 RX ADMIN — CEFTRIAXONE 2 G: 2 INJECTION, POWDER, FOR SOLUTION INTRAMUSCULAR; INTRAVENOUS at 11:11

## 2022-11-22 RX ADMIN — INSULIN DETEMIR 20 UNITS: 100 INJECTION, SOLUTION SUBCUTANEOUS at 09:11

## 2022-11-22 RX ADMIN — LOPERAMIDE HYDROCHLORIDE 2 MG: 2 CAPSULE ORAL at 08:11

## 2022-11-22 RX ADMIN — GABAPENTIN 800 MG: 400 CAPSULE ORAL at 08:11

## 2022-11-22 NOTE — PROGRESS NOTES
[facility]  Progress Note    Patient Name: Joseph Paris Jr.  MRN: [id number]  Patient Class: IP- Inpatient   Admission Date: 11/12/2022  Length of Stay: 9 days  Attending Physician: Sarika Madrid MD  Primary Care Provider: Thong Hidalgo Jr, MD        Subjective:     Principal Problem:Bacteremia due to group B Streptococcus        HPI:  52 y.o. male patient with a PMHx of HTN, DM, and CAD presents to the Emergency Department for evaluation of Bilateral Feet Swelling which onset gradually within the past week. The pt last saw their wound care three weeks ago. The pt lost his health insurance coverage and decided to take care of the wounds himself. The pt self drained pus from his wounds and used a heated water massager on his feet; reports feet look more infected than before. Symptoms are constant and moderate in severity. No mitigating or exacerbating factors reported. Associated sxs include fever and SOB. Patient denies any CP, N/V, weakness, dysuria, and all other sxs at this time      Overview/Hospital Course:  S/p I&D of foot per Podiatry, recommended MRI and angio with possible referral to vascular surgery. Concerns for viability of flap due to appearance of wounds. Will likely need to return to the OR later this week. Wound cultures collected during procedure, Blood culture: gram positive cocci in chains in both bottles, adjusted antibiotic regimen, added vancomycin. On 11/15- Blood cultures ultimately grew MDR group B strep, but sensitive to both Rocephin and Vancomycin. Staphylococcus speciated as well in blood, but sensitivities still pending. Angiogram ordered by Vascular surgery tentatively scheduled to be done on 11/15. Cannulization in R femoral artery successful. Patient reports notable improvement in his limb swelling and pain immediately after procedure. Further debridement tentatively scheduled for 11/17. Tolerated procedure well. Wound vac placed at that time as  well. PICC line inserted on 11/18/22. Definitive antibiotic recs placed by ID (see their note for specifics). Total duration of therapy 6 weeks.  Surgery plans to takedown Wound vac and reassess wound on Monday 11/21/2022.       Interval History: NAEON. Pt has no new complaints today. Denies any foot pain, CP, SOB, abd pain. Having loose stool.     Review of Systems  Objective:     Vital Signs (Most Recent):  Temp: 98.1 °F (36.7 °C) (11/22/22 1257)  Pulse: 84 (11/22/22 1257)  Resp: 18 (11/22/22 1257)  BP: (!) 141/71 (11/22/22 1257)  SpO2: 100 % (11/22/22 1257)   Vital Signs (24h Range):  Temp:  [98.1 °F (36.7 °C)-98.9 °F (37.2 °C)] 98.1 °F (36.7 °C)  Pulse:  [83-90] 84  Resp:  [12-18] 18  SpO2:  [94 %-100 %] 100 %  BP: (107-160)/(55-73) 141/71     Weight: 104.8 kg (231 lb 0.7 oz)  Body mass index is 28.88 kg/m².    Intake/Output Summary (Last 24 hours) at 11/22/2022 1427  Last data filed at 11/22/2022 0841  Gross per 24 hour   Intake 899.79 ml   Output 50 ml   Net 849.79 ml      Physical Exam  Vitals and nursing note reviewed.   Constitutional:       General: He is not in acute distress.     Appearance: Normal appearance.   Cardiovascular:      Rate and Rhythm: Normal rate and regular rhythm.      Heart sounds: No murmur heard.    No friction rub. No gallop.   Pulmonary:      Effort: Pulmonary effort is normal.      Breath sounds: No wheezing, rhonchi or rales.   Abdominal:      General: Bowel sounds are normal. There is no distension.      Palpations: Abdomen is soft.      Tenderness: There is no abdominal tenderness. There is no guarding or rebound.   Musculoskeletal:      Comments: B/l foot ACE dressings in place, wound vac to L foot    Neurological:      Mental Status: He is alert and oriented to person, place, and time. Mental status is at baseline.       Significant Labs: All pertinent labs within the past 24 hours have been reviewed.    Significant Imaging: I have reviewed all pertinent imaging  results/findings within the past 24 hours.      Assessment/Plan:      * Bacteremia due to group B Streptococcus  - Continue Rocephin per ID   --ID following, recommending IV Daptomycin + Rocephin x 6 weeks  --ambulatory infusion orders placed for abx and wound vac, pending insurance/approval     Diarrhea  Likely antibiotic associated, pt afebrile and without leukocytosis   Continue to monitor, if persistent, will r/o Cdif    Continue probiotic       Bacteremia due to methicillin resistant Staphylococcus aureus  - Continue IV vanc per ID; dosing and monitoring of Vanc per pharmacy       Decubitus ulcer of right heel  --wound care consulted and following  - continue local wound care     Diabetes mellitus  --continue basal insulin at 20 units QHS,increase AM insulin to 10 u   --continue SSI, prandial insulin therapy-aspart 5 TIDWM  --Accuchecks ACQHS  - BG goal 140-180 for optimal wound healing       TIERA (acute kidney injury)  - Cr improved with IV fluids  - monitor BMP intermittently      Acute osteomyelitis of metatarsal bone of left foot  -s/p I&D on 11/13  -BC (11/13) grew group B strep- repeat cultures (11/14) no growth to date   -MRI of L FOOT: Large ulceration extending along 1st metatarsal shaft to 1st proximal phalanx. Abnormal bone marrow signal enhancement compatible with osteomyelitis.   -  vascular surgery consulted -Angiogram of bilateral LE's on 11/15 with Dr Berman. -successful recannulization of R femoral   -s/p  debridement and wound vac placement on 11/16 with Podiatry Dr. Augustine   - ID consulted; rec IV Dapto and Rocephin x 6 weeks EOT 12/29/22  - discussed with case management- unable to obtain wound vac or home IV abx at this time as pt does not have insurance at this time, medicaid pending         VTE Risk Mitigation (From admission, onward)         Ordered     enoxaparin injection 40 mg  Daily         11/12/22 2232     IP VTE HIGH RISK PATIENT  Once         11/12/22 2232     Place sequential  compression device  Until discontinued         11/12/22 6042                Discharge Planning   KIT:      Code Status: Full Code   Is the patient medically ready for discharge?:     Reason for patient still in hospital (select all that apply): Pending disposition  Discharge Plan A: Home   Discharge Delays: None known at this time              Sarika Madrid MD  Department of Hospital Medicine   [facility] - Med Surg

## 2022-11-22 NOTE — ASSESSMENT & PLAN NOTE
--continue basal insulin at 20 units QHS,increase AM insulin to 10 u   --continue SSI, prandial insulin therapy-aspart 5 TIDWM  --Accuchecks ACQHS  - BG goal 140-180 for optimal wound healing      HBO provider wound care -    Right toe amp site: dressing removed, area cleansed, Iodoflex, gauze, kerlix applied. Patient reports increased pain when anything is touching his amp site, phantom pain discussed by Dr. Hurley. Tomorrow 3/23 will be last HBOT.

## 2022-11-22 NOTE — ASSESSMENT & PLAN NOTE
- Continue Rocephin per ID   --ID following, recommending IV Daptomycin + Rocephin x 6 weeks  --ambulatory infusion orders placed for abx and wound vac, pending insurance/approval

## 2022-11-22 NOTE — ASSESSMENT & PLAN NOTE
-s/p I&D on 11/13  -BC (11/13) grew group B strep- repeat cultures (11/14) no growth to date   -MRI of L FOOT: Large ulceration extending along 1st metatarsal shaft to 1st proximal phalanx. Abnormal bone marrow signal enhancement compatible with osteomyelitis.   -  vascular surgery consulted -Angiogram of bilateral LE's on 11/15 with Dr Berman. -successful recannulization of R femoral   -s/p  debridement and wound vac placement on 11/16 with Podiatry Dr. Augustine   - ID consulted; rec IV Dapto and Rocephin x 6 weeks EOT 12/29/22  - discussed with case management- unable to obtain wound vac or home IV abx at this time as pt does not have insurance at this time, medicaid pending

## 2022-11-22 NOTE — PLAN OF CARE
Care plan reviewed with patient. Walked with PT. Ambulated in the room. Free from falls. No other complaints made. All orders checked and reviewed.

## 2022-11-22 NOTE — SUBJECTIVE & OBJECTIVE
Interval History: NAEON. Pt has no new complaints today. Denies any foot pain, CP, SOB, abd pain. Having loose stool.     Review of Systems  Objective:     Vital Signs (Most Recent):  Temp: 98.1 °F (36.7 °C) (11/22/22 1257)  Pulse: 84 (11/22/22 1257)  Resp: 18 (11/22/22 1257)  BP: (!) 141/71 (11/22/22 1257)  SpO2: 100 % (11/22/22 1257)   Vital Signs (24h Range):  Temp:  [98.1 °F (36.7 °C)-98.9 °F (37.2 °C)] 98.1 °F (36.7 °C)  Pulse:  [83-90] 84  Resp:  [12-18] 18  SpO2:  [94 %-100 %] 100 %  BP: (107-160)/(55-73) 141/71     Weight: 104.8 kg (231 lb 0.7 oz)  Body mass index is 28.88 kg/m².    Intake/Output Summary (Last 24 hours) at 11/22/2022 1427  Last data filed at 11/22/2022 0841  Gross per 24 hour   Intake 899.79 ml   Output 50 ml   Net 849.79 ml      Physical Exam  Vitals and nursing note reviewed.   Constitutional:       General: He is not in acute distress.     Appearance: Normal appearance.   Cardiovascular:      Rate and Rhythm: Normal rate and regular rhythm.      Heart sounds: No murmur heard.    No friction rub. No gallop.   Pulmonary:      Effort: Pulmonary effort is normal.      Breath sounds: No wheezing, rhonchi or rales.   Abdominal:      General: Bowel sounds are normal. There is no distension.      Palpations: Abdomen is soft.      Tenderness: There is no abdominal tenderness. There is no guarding or rebound.   Musculoskeletal:      Comments: B/l foot ACE dressings in place, wound vac to L foot    Neurological:      Mental Status: He is alert and oriented to person, place, and time. Mental status is at baseline.       Significant Labs: All pertinent labs within the past 24 hours have been reviewed.    Significant Imaging: I have reviewed all pertinent imaging results/findings within the past 24 hours.

## 2022-11-22 NOTE — PHYSICIAN QUERY
PT Name: Joseph Paris Jr.  MR #: [id number]     DOCUMENTATION CLARIFICATION     CDS/: Brenda Vanessa RN          Contact Information: [email or web address]    This form is a permanent document in the medical record.     Query Date: November 22, 2022    By submitting this query, we are merely seeking further clarification of documentation.  Please utilize your independent clinical judgment when addressing the question(s) below.    The Medical Record contains the following:   Indicators   Supporting Clinical Findings Location in Medical Record    Non-blanchable erythema/redness       x Ulcer/Injury/Skin Breakdown R sided heel ulcer   Decubitus ulcer of right heel 11/14/2022 HM PN     Deep Tissue Injury       x Wound care consult Right heel: Description: Full thickness tissue loss. Base is covered by slough and/or eschar in the wound bed  11/21/2022 wound consult     x Acute/Chronic Illness Acute osteomyelitis of metatarsal bone of left foot, HTN, DM, and CAD   Severe sepsis, TIERA 11/12/2022 H&P     x Medication/Treatment Applied:;Povidone iodine  Dressing: Gauze;Elastic bandage 11/21/2022 wound consult     x Other Right heel:    Scans Photograph 11/14/2022     The clinical guidelines noted are only a system guideline. It does not replace the providers clinical judgment.    Per the National Pressure Injury Advisory Panel:   A pressure injury is localized damage to the skin and underlying soft tissue usually over a bony prominence or related to a medical or other device. The injury can present as intact skin or an open ulcer and may be painful. The injury occurs as a result of intense and/or prolonged pressure or pressure in combination with shear. The tolerance of soft tissue for pressure and shear may also be affected by microclimate, nutrition, perfusion, co-morbidities and condition of the soft tissue.       Stage 1 Pressure Injury:  Intact skin with a localized area of non-blanchable erythema, which may  appear differently in darkly pigmented skin. Color changes do not include purple or maroon discoloration; these may indicate deep tissue pressure injury.    Stage 2 Pressure Injury:  Partial-thickness loss of skin with exposed dermis. The wound bed is viable, pink or red, moist, and may also present as an intact or ruptured serum-filled blister.    Stage 3 Pressure Injury:  Full-thickness loss of skin, in which adipose (fat) is visible in the ulcer and granulation tissue and epibole (rolled wound edges) are often present. Slough and/or eschar may be visible. Undermining and tunneling may occur.    Stage 4 Pressure Injury:  Full-thickness skin and tissue loss with exposed or directly palpable fascia, muscle, tendon, ligament, cartilage or bone in the ulcer. Slough and/or eschar may be visible. Epibole (rolled edges), undermining and/or tunneling often occur.    Unstageable Pressure Injury:  Full-thickness skin and tissue loss in which the extent of tissue damage within the ulcer cannot be confirmed because it is obscured by slough or eschar. If slough or eschar is removed, a Stage 3 or Stage 4 pressure injury will be revealed.    Deep Tissue Pressure Injury:  Intact or non-intact skin with localized area of persistent non-blanchable deep red, maroon, purple discoloration or epidermal separation revealing a dark wound bed or blood filled blister. This injury results from intense and/or prolonged pressure and shear forces at the bone-muscle interface. The wound may evolve rapidly to reveal the actual extent of tissue injury, or may resolve without tissue loss. If necrotic tissue, subcutaneous tissue, granulation tissue, fascia, muscle or other underlying structures are visible, this indicates a full thickness pressure injury (Unstageable, Stage 3 or Stage 4). Do not use DTPI to describe vascular, traumatic, neuropathic, or dermatologic conditions.   Medical Device Related Pressure Injury: This describes an etiology.  Medical device related pressure injuries result from the use of devices designed and applied for diagnostic or therapeutic purposes. The resultant pressure injury generally conforms to the pattern or shape of the device. The injury should be staged using the staging system.    Mucosal Membrane Pressure Injury: Mucosal membrane pressure injury is found on mucous membranes with a history of a medical device in use at the location of the injury. Due to the anatomy of the tissue these ulcers cannot be staged.       Provider, please clarify the decubitus integumentary diagnosis related to the documentation of the Right heel:     [   ] Pressure Injury/Decubitus Ulcer, Stage 3   [   ] Pressure Injury/Decubitus Ulcer, Stage 4   [  X ] Pressure Injury/Decubitus Ulcer, Unstageable   [   ] Other Integumentary Diagnosis (please specify):______________   [  ] Clinically Undetermined           Please document in your progress notes daily for the duration of treatment until resolved and include in your discharge summary.    Reference:    WILBER King., SYED Hernandez., Goldberg, M., RONAN Blas., WILBER Salguero, & AYDEE Ayala. (2016). Revised National Pressure Ulcer Advisory Panel Pressure Injury Staging System: Revised Pressure Injury Staging System. J Wound Ostomy Continence Nurs, 43(6), 585-597. doi:10.1097/won.7327279105420345    Form No.28348

## 2022-11-22 NOTE — CONSULTS
"Pharmacokinetic Assessment Follow Up: IV Vancomycin    Vancomycin serum concentration assessment(s):    The trough level was drawn correctly and can be used to guide therapy at this time. The measurement is only slightly below the desired definitive target range of 15 to 20 mcg/mL.    Vancomycin Regimen Plan:    Continue regimen of Vancomycin 2000 mg IV every 24 hours with next serum trough concentration measured at 1430 prior to 5th dose on 11/23/22.    Drug levels (last 3 results):  Recent Labs   Lab Result Units 11/19/22  1249 11/21/22  1502   Vancomycin, Random ug/mL 7.2  --    Vancomycin-Trough ug/mL  --  14.8       Pharmacy will continue to follow and monitor vancomycin.    Please contact pharmacy at extension [phone] for questions regarding this assessment.    Thank you for the consult,   Tete Jose PharmD       Patient brief summary:  Joseph Paris Jr. is a 52 y.o. male initiated on antimicrobial therapy with IV Vancomycin for treatment of bacteremia due to MRSA and Group B Strep, acute osteomyelitis of metatarsal bone of left foot    The patient's current regimen is Vancomycin 2000 mg IV every 24 hours    Drug Allergies:   Review of patient's allergies indicates:   Allergen Reactions    Lisinopril Swelling    Penicillins Other (See Comments)     Pt unsure of reaction, stating "I just know I'm allergic"       Actual Body Weight:   104.8 kg    Renal Function:   Estimated Creatinine Clearance: 85.1 mL/min (based on SCr of 1.3 mg/dL).,     Dialysis Method (if applicable):  N/A    CBC (last 72 hours):  No results for input(s): WHITE BLOOD CELL COUNT, HEMOGLOBIN, HEMATOCRIT, PLATELETS, GRAN%, LYMPH%, MONO%, EOSINOPHIL%, BASOPHIL%, DIFFERENTIAL METHOD in the last 72 hours.    Metabolic Panel (last 72 hours):  Recent Labs   Lab Result Units 11/19/22  1249 11/20/22  0620 11/21/22  0614   Creatinine mg/dL 1.3 1.2 1.3       Vancomycin Administrations:  vancomycin given in the last 96 hours                     " vancomycin 2 g in dextrose 5 % 500 mL IVPB (mg) 2,000 mg New Bag 11/21/22 1611     2,000 mg New Bag 11/20/22 1602     2,000 mg New Bag 11/19/22 1510    vancomycin 1.5 g in dextrose 5 % 250 mL IVPB (ready to mix) (mg) 1,500 mg New Bag 11/18/22 1353                    Microbiologic Results:  Microbiology Results (last 7 days)       Procedure Component Value Units Date/Time    Blood culture [[id number]] Collected: 11/14/22 0536    Order Status: Completed Specimen: Blood Updated: 11/19/22 2012     Blood Culture, Routine No growth after 5 days.    Blood culture [[id number]] Collected: 11/14/22 0536    Order Status: Completed Specimen: Blood Updated: 11/19/22 2012     Blood Culture, Routine No growth after 5 days.    Culture, Anaerobe [[id number]] Collected: 11/13/22 1148    Order Status: Completed Specimen: Abscess from Foot, Left Updated: 11/18/22 0917     Anaerobic Culture No anaerobes isolated    Narrative:      Left Foot Abscess    Fungus culture [[id number]] Collected: 11/13/22 1148    Order Status: Completed Specimen: Abscess from Foot, Left Updated: 11/17/22 1005     Fungus (Mycology) Culture Culture in progress    Narrative:      Left Foot Abscess    Aerobic culture [[id number]]  (Abnormal) Collected: 11/13/22 1148    Order Status: Completed Specimen: Abscess from Foot, Left Updated: 11/16/22 1333     Aerobic Bacterial Culture STREPTOCOCCUS AGALACTIAE (GROUP B)  Many  Beta-hemolytic streptococci are routinely susceptible to   penicillins,cephalosporins and carbapenems.      Narrative:      Left Foot Abscess    Blood Culture #2 **CANNOT BE ORDERED STAT** [[id number]]  (Abnormal) Collected: 11/12/22 2021    Order Status: Completed Specimen: Blood from Peripheral, Forearm, Left Updated: 11/16/22 1152     Blood Culture, Routine Gram stain silva bottle: Gram positive cocci in chains resembling Strep      Results called to and read back by:Delores Zhou RN 11/13/2022  13:16      Gram stain aer bottle: Gram positive cocci in chains  resembling Strep      Positive results previously called 11/13/2022  14:48      STREPTOCOCCUS AGALACTIAE (GROUP B)  Beta-hemolytic streptococci are routinely susceptible to   penicillins,cephalosporins and carbapenems.  For susceptibility see order #[id number]        METHICILLIN RESISTANT STAPHYLOCOCCUS AUREUS  ID consult required at [facility].For   susceptibility see order #[id number]      Blood Culture #1 **CANNOT BE ORDERED STAT** [[id number]]  (Abnormal)  (Susceptibility) Collected: 11/12/22 1941    Order Status: Completed Specimen: Blood from Peripheral, Antecubital, Left Updated: 11/16/22 1127     Blood Culture, Routine Gram stain silva bottle: Gram positive cocci in chains resembling Strep      Results called to and read back by:Delores Zhou RN 11/13/2022  13:16      Gram stain aer bottle: Gram positive cocci in chains resembling Strep      Positive results previously called 11/13/2022  14:47      STREPTOCOCCUS AGALACTIAE (GROUP B)  Beta-hemolytic streptococci are routinely susceptible to   penicillins,cephalosporins and carbapenems.        METHICILLIN RESISTANT STAPHYLOCOCCUS AUREUS  ID consult required at [facility].  ID consult required at [facility].      AFB Culture & Smear [[id number]] Collected: 11/13/22 1148    Order Status: Completed Specimen: Abscess from Foot, Left Updated: 11/14/22 2127     AFB Culture & Smear Culture in progress     AFB CULTURE STAIN No acid fast bacilli seen.    Narrative:      Left Foot Abscess          Thank you for allowing us to participate in this patient's care.     Tete Jose PharmD 11/21/2022 8:41 PM

## 2022-11-22 NOTE — ASSESSMENT & PLAN NOTE
Likely antibiotic associated, pt afebrile and without leukocytosis   Continue to monitor, if persistent, will r/o Cdif    Continue probiotic

## 2022-11-22 NOTE — PLAN OF CARE
"Deborah spoke with Mauro JOHNSON With the [facility] team regarding update for medicaid application. Per Mauro "As of this morning, Mr. Paris is showing approved for Medicaid. It usually takes about 2-3 days for his Subscriber/coverage information to populate. I will update his coverage as soon as it populates." Pending plan update before IV abx can be approved.  Deborah notified provider and patient of status.   "

## 2022-11-22 NOTE — PLAN OF CARE
Care plan reviewed with patient. Walked with PT. Wound vac dressing done by Deepti. Ambulated in the room. Free from falls. No other complaints made. All orders checked and reviewed.

## 2022-11-22 NOTE — PLAN OF CARE
Fall precautions maintained. Call bell and personal items within reach. VSS. No complaints of pain or discomfort. Glucose monitoring continued. Chart check complete.     Problem: Adult Inpatient Plan of Care  Goal: Plan of Care Review  Outcome: Ongoing, Progressing     Problem: Adult Inpatient Plan of Care  Goal: Absence of Hospital-Acquired Illness or Injury  Outcome: Ongoing, Progressing     Problem: Adult Inpatient Plan of Care  Goal: Optimal Comfort and Wellbeing  Outcome: Ongoing, Progressing     Problem: Adjustment to Illness (Sepsis/Septic Shock)  Goal: Optimal Coping  Outcome: Ongoing, Progressing

## 2022-11-23 LAB
CREAT SERPL-MCNC: 1.7 MG/DL (ref 0.5–1.4)
EST. GFR  (NO RACE VARIABLE): 47 ML/MIN/1.73 M^2
POCT GLUCOSE: 185 MG/DL (ref 70–110)
POCT GLUCOSE: 215 MG/DL (ref 70–110)
POCT GLUCOSE: 231 MG/DL (ref 70–110)
POCT GLUCOSE: 259 MG/DL (ref 70–110)
VANCOMYCIN TROUGH SERPL-MCNC: 16.9 UG/ML (ref 10–22)

## 2022-11-23 PROCEDURE — 63600175 PHARM REV CODE 636 W HCPCS: Performed by: INTERNAL MEDICINE

## 2022-11-23 PROCEDURE — 25000003 PHARM REV CODE 250: Performed by: PODIATRIST

## 2022-11-23 PROCEDURE — 63600175 PHARM REV CODE 636 W HCPCS: Performed by: PODIATRIST

## 2022-11-23 PROCEDURE — 25000003 PHARM REV CODE 250: Performed by: NURSE PRACTITIONER

## 2022-11-23 PROCEDURE — 82565 ASSAY OF CREATININE: CPT | Performed by: STUDENT IN AN ORGANIZED HEALTH CARE EDUCATION/TRAINING PROGRAM

## 2022-11-23 PROCEDURE — 21400001 HC TELEMETRY ROOM

## 2022-11-23 PROCEDURE — 25000003 PHARM REV CODE 250: Performed by: STUDENT IN AN ORGANIZED HEALTH CARE EDUCATION/TRAINING PROGRAM

## 2022-11-23 PROCEDURE — 80202 ASSAY OF VANCOMYCIN: CPT | Performed by: INTERNAL MEDICINE

## 2022-11-23 PROCEDURE — 25000003 PHARM REV CODE 250: Performed by: INTERNAL MEDICINE

## 2022-11-23 PROCEDURE — 27000207 HC ISOLATION

## 2022-11-23 RX ORDER — SODIUM CHLORIDE 9 MG/ML
INJECTION, SOLUTION INTRAVENOUS CONTINUOUS
Status: ACTIVE | OUTPATIENT
Start: 2022-11-23 | End: 2022-11-24

## 2022-11-23 RX ADMIN — GABAPENTIN 400 MG: 400 CAPSULE ORAL at 09:11

## 2022-11-23 RX ADMIN — INSULIN ASPART 6 UNITS: 100 INJECTION, SOLUTION INTRAVENOUS; SUBCUTANEOUS at 04:11

## 2022-11-23 RX ADMIN — Medication 1 TABLET: at 08:11

## 2022-11-23 RX ADMIN — CEFTRIAXONE 2 G: 2 INJECTION, POWDER, FOR SOLUTION INTRAMUSCULAR; INTRAVENOUS at 11:11

## 2022-11-23 RX ADMIN — VANCOMYCIN HYDROCHLORIDE 2000 MG: 10 INJECTION, POWDER, LYOPHILIZED, FOR SOLUTION INTRAVENOUS at 04:11

## 2022-11-23 RX ADMIN — INSULIN ASPART 2 UNITS: 100 INJECTION, SOLUTION INTRAVENOUS; SUBCUTANEOUS at 12:11

## 2022-11-23 RX ADMIN — INSULIN DETEMIR 20 UNITS: 100 INJECTION, SOLUTION SUBCUTANEOUS at 09:11

## 2022-11-23 RX ADMIN — ENOXAPARIN SODIUM 40 MG: 40 INJECTION SUBCUTANEOUS at 04:11

## 2022-11-23 RX ADMIN — AMITRIPTYLINE HYDROCHLORIDE 50 MG: 50 TABLET, FILM COATED ORAL at 09:11

## 2022-11-23 RX ADMIN — INSULIN ASPART 5 UNITS: 100 INJECTION, SOLUTION INTRAVENOUS; SUBCUTANEOUS at 06:11

## 2022-11-23 RX ADMIN — SODIUM CHLORIDE: 0.9 INJECTION, SOLUTION INTRAVENOUS at 02:11

## 2022-11-23 RX ADMIN — Medication 1 TABLET: at 04:11

## 2022-11-23 RX ADMIN — LOPERAMIDE HYDROCHLORIDE 2 MG: 2 CAPSULE ORAL at 08:11

## 2022-11-23 RX ADMIN — INSULIN ASPART 2 UNITS: 100 INJECTION, SOLUTION INTRAVENOUS; SUBCUTANEOUS at 09:11

## 2022-11-23 RX ADMIN — AMLODIPINE BESYLATE 5 MG: 5 TABLET ORAL at 08:11

## 2022-11-23 RX ADMIN — Medication 1 TABLET: at 12:11

## 2022-11-23 RX ADMIN — PRAVASTATIN SODIUM 20 MG: 20 TABLET ORAL at 08:11

## 2022-11-23 RX ADMIN — INSULIN ASPART 5 UNITS: 100 INJECTION, SOLUTION INTRAVENOUS; SUBCUTANEOUS at 04:11

## 2022-11-23 RX ADMIN — INSULIN ASPART 5 UNITS: 100 INJECTION, SOLUTION INTRAVENOUS; SUBCUTANEOUS at 12:11

## 2022-11-23 RX ADMIN — PANTOPRAZOLE SODIUM 40 MG: 40 TABLET, DELAYED RELEASE ORAL at 08:11

## 2022-11-23 RX ADMIN — OXYCODONE HYDROCHLORIDE 5 MG: 5 TABLET ORAL at 08:11

## 2022-11-23 RX ADMIN — GABAPENTIN 800 MG: 400 CAPSULE ORAL at 08:11

## 2022-11-23 NOTE — ASSESSMENT & PLAN NOTE
-s/p I&D on 11/13  -BC (11/13) grew group B strep- repeat cultures (11/14) no growth to date   -MRI of L FOOT: Large ulceration extending along 1st metatarsal shaft to 1st proximal phalanx. Abnormal bone marrow signal enhancement compatible with osteomyelitis.   -  vascular surgery consulted -Angiogram of bilateral LE's on 11/15 with Dr Berman. -successful recannulization of R femoral   -s/p  debridement and wound vac placement on 11/16 with Podiatry Dr. Augustine   - ID consulted; rec IV Dapto and Rocephin x 6 weeks EOT 12/29/22  - discussed with case management- pending set up of wound vac and home IV abx due to issues with insurance

## 2022-11-23 NOTE — ASSESSMENT & PLAN NOTE
Likely antibiotic associated, pt afebrile and without leukocytosis   - obtain Cdif   - continue probiotic, hold lomotil for now

## 2022-11-23 NOTE — PROGRESS NOTES
"Pharmacokinetic Assessment Follow Up: IV Vancomycin    Vancomycin serum concentration assessment(s):    The trough level was drawn correctly and can be used to guide therapy at this time. The measurement is within the desired definitive target range of 15 to 20 mcg/mL.    Vancomycin Regimen Plan:    Will give a 2000mg x1 dose. Will start pulse dosing since serum creatinine is trending up.  Re-dose when the random level is less than 20 mcg/mL, next level to be drawn at 1600 on 11/24    Drug levels (last 3 results):  Recent Labs   Lab Result Units 11/21/22  1502 11/23/22  1501   Vancomycin-Trough ug/mL 14.8 16.9       Pharmacy will continue to follow and monitor vancomycin.    Please contact pharmacy at extension [phone] for questions regarding this assessment.    Thank you for the consult,   Cesia Randall       Patient brief summary:  Joseph Paris Jr. is a 52 y.o. male initiated on antimicrobial therapy with IV Vancomycin for treatment of bacteremia    The patient's current regimen is pulse dosing    Drug Allergies:   Review of patient's allergies indicates:   Allergen Reactions    Lisinopril Swelling    Penicillins Other (See Comments)     Pt unsure of reaction, stating "I just know I'm allergic"       Actual Body Weight:   127.6kg    Renal Function:   Estimated Creatinine Clearance: 71.5 mL/min (A) (based on SCr of 1.7 mg/dL (H)).,     Dialysis Method (if applicable):  N/A    CBC (last 72 hours):  Recent Labs   Lab Result Units 11/22/22  0542   WBC K/uL 7.82   Hemoglobin g/dL 7.0*   Hematocrit % 22.1*   Platelets K/uL 425   Gran % % 59.4   Lymph % % 27.4   Mono % % 8.7   Eosinophil % % 2.6   Basophil % % 0.5   Differential Method  Automated       Metabolic Panel (last 72 hours):  Recent Labs   Lab Result Units 11/21/22  0614 11/22/22  0542 11/23/22  0626   Sodium mmol/L  --  133*  --    Potassium mmol/L  --  4.0  --    Chloride mmol/L  --  101  --    CO2 mmol/L  --  24  --    Glucose mg/dL  --  247*  --    BUN " mg/dL  --  10  --    Creatinine mg/dL 1.3 1.5*  1.5* 1.7*       Vancomycin Administrations:  vancomycin given in the last 96 hours                     vancomycin 2 g in dextrose 5 % 500 mL IVPB (mg) 2,000 mg New Bag 11/23/22 1657    vancomycin 2 g in dextrose 5 % 500 mL IVPB (mg) 2,000 mg New Bag 11/22/22 1545     2,000 mg New Bag 11/21/22 1611     2,000 mg New Bag 11/20/22 1602                    Microbiologic Results:  Microbiology Results (last 7 days)       Procedure Component Value Units Date/Time    Clostridium difficile EIA [[id number]]     Order Status: No result Specimen: Stool     Blood culture [[id number]] Collected: 11/14/22 0536    Order Status: Completed Specimen: Blood Updated: 11/19/22 2012     Blood Culture, Routine No growth after 5 days.    Blood culture [[id number]] Collected: 11/14/22 0536    Order Status: Completed Specimen: Blood Updated: 11/19/22 2012     Blood Culture, Routine No growth after 5 days.    Culture, Anaerobe [[id number]] Collected: 11/13/22 1148    Order Status: Completed Specimen: Abscess from Foot, Left Updated: 11/18/22 0917     Anaerobic Culture No anaerobes isolated    Narrative:      Left Foot Abscess    Fungus culture [[id number]] Collected: 11/13/22 1148    Order Status: Completed Specimen: Abscess from Foot, Left Updated: 11/17/22 1005     Fungus (Mycology) Culture Culture in progress    Narrative:      Left Foot Abscess

## 2022-11-23 NOTE — PROGRESS NOTES
[facility] Medicine  Progress Note    Patient Name: Joseph Paris Jr.  MRN: [id number]  Patient Class: IP- Inpatient   Admission Date: 11/12/2022  Length of Stay: 10 days  Attending Physician: Sarika Madrid MD  Primary Care Provider: Thong Hidalgo Jr, MD        Subjective:     Principal Problem:Bacteremia due to group B Streptococcus        HPI:  52 y.o. male patient with a PMHx of HTN, DM, and CAD presents to the Emergency Department for evaluation of Bilateral Feet Swelling which onset gradually within the past week. The pt last saw their wound care three weeks ago. The pt lost his health insurance coverage and decided to take care of the wounds himself. The pt self drained pus from his wounds and used a heated water massager on his feet; reports feet look more infected than before. Symptoms are constant and moderate in severity. No mitigating or exacerbating factors reported. Associated sxs include fever and SOB. Patient denies any CP, N/V, weakness, dysuria, and all other sxs at this time      Overview/Hospital Course:  S/p I&D of foot per Podiatry, recommended MRI and angio with possible referral to vascular surgery. Concerns for viability of flap due to appearance of wounds. Will likely need to return to the OR later this week. Wound cultures collected during procedure, Blood culture: gram positive cocci in chains in both bottles, adjusted antibiotic regimen, added vancomycin. On 11/15- Blood cultures ultimately grew MDR group B strep, but sensitive to both Rocephin and Vancomycin. Staphylococcus speciated as well in blood, but sensitivities still pending. Angiogram ordered by Vascular surgery tentatively scheduled to be done on 11/15. Cannulization in R femoral artery successful. Patient reports notable improvement in his limb swelling and pain immediately after procedure. Further debridement tentatively scheduled for 11/17. Tolerated procedure well. Wound vac placed at that time as  well. PICC line inserted on 11/18/22. Definitive antibiotic recs placed by ID (see their note for specifics). Total duration of therapy 6 weeks.  Surgery plans to takedown Wound vac and reassess wound on Monday 11/21/2022.       Interval History: NAEON. Pt reports he feels ok, continues to have watery diarrhea. Denies abd discomfort, nausea, vomiting. Will r/o Cdif.                                         Review of Systems  Objective:     Vital Signs (Most Recent):  Temp: 98.1 °F (36.7 °C) (11/23/22 1154)  Pulse: 78 (11/23/22 1154)  Resp: 16 (11/23/22 1154)  BP: (!) 151/72 (11/23/22 1154)  SpO2: 100 % (11/23/22 1154)   Vital Signs (24h Range):  Temp:  [97.7 °F (36.5 °C)-98.5 °F (36.9 °C)] 98.1 °F (36.7 °C)  Pulse:  [75-84] 78  Resp:  [16-19] 16  SpO2:  [98 %-100 %] 100 %  BP: (125-151)/(65-72) 151/72     Weight: 127.6 kg (281 lb 4.9 oz)  Body mass index is 35.16 kg/m².    Intake/Output Summary (Last 24 hours) at 11/23/2022 1239  Last data filed at 11/23/2022 0800  Gross per 24 hour   Intake 1243.33 ml   Output --   Net 1243.33 ml      Physical Exam  Vitals and nursing note reviewed.   Constitutional:       General: He is not in acute distress.     Appearance: He is obese. He is not ill-appearing.   Cardiovascular:      Rate and Rhythm: Normal rate and regular rhythm.      Heart sounds: No murmur heard.    No friction rub. No gallop.   Pulmonary:      Effort: Pulmonary effort is normal.      Breath sounds: Normal breath sounds. No wheezing, rhonchi or rales.   Abdominal:      General: Bowel sounds are normal. There is no distension.      Palpations: Abdomen is soft.      Tenderness: There is no abdominal tenderness. There is no guarding or rebound.   Musculoskeletal:      Comments: B/l feet with ACE dressings in place, wound vac to RLE    Neurological:      Mental Status: He is alert and oriented to person, place, and time. Mental status is at baseline.       Significant Labs: All pertinent labs within the past 24  hours have been reviewed.    Significant Imaging: I have reviewed all pertinent imaging results/findings within the past 24 hours.      Assessment/Plan:      * Bacteremia due to group B Streptococcus  - Continue Rocephin per ID   --ID following, recommending IV Daptomycin + Rocephin x 6 weeks  --ambulatory infusion orders placed for abx and wound vac, pending insurance/approval     Diarrhea  Likely antibiotic associated, pt afebrile and without leukocytosis   - obtain Cdif   - continue probiotic, hold lomotil for now    Bacteremia due to methicillin resistant Staphylococcus aureus  - Continue IV vanc per ID; dosing and monitoring of Vanc per pharmacy       Decubitus ulcer of right heel  --wound care consulted and following  - continue local wound care     Diabetes mellitus  --continue basal insulin at 20 units QHS,continue AM insulin 10 u   --continue SSI, prandial insulin therapy-aspart 5 TIDWM  --Accuchecks ACQHS  - BG goal 140-180 for optimal wound healing       TIERA (acute kidney injury)  - Cr initially improved with fluids, now uptrending again. ?IVVD due to diarrhea vs. ATN in setting of Vanc use   - start gentle IV hydration, encourage PO intake   - monitor BMP; renally dose meds     Severe sepsis  resolved  -Leukocytosis resolved, patient afebrile  -see plan for osteo    Acute osteomyelitis of metatarsal bone of left foot  -s/p I&D on 11/13  -BC (11/13) grew group B strep- repeat cultures (11/14) no growth to date   -MRI of L FOOT: Large ulceration extending along 1st metatarsal shaft to 1st proximal phalanx. Abnormal bone marrow signal enhancement compatible with osteomyelitis.   -  vascular surgery consulted -Angiogram of bilateral LE's on 11/15 with Dr Berman. -successful recannulization of R femoral   -s/p  debridement and wound vac placement on 11/16 with Podiatry Dr. Augustine   - ID consulted; rec IV Dapto and Rocephin x 6 weeks EOT 12/29/22  - discussed with case management- pending set up of wound vac  and home IV abx due to issues with insurance         VTE Risk Mitigation (From admission, onward)         Ordered     enoxaparin injection 40 mg  Daily         11/12/22 2232     IP VTE HIGH RISK PATIENT  Once         11/12/22 2232     Place sequential compression device  Until discontinued         11/12/22 2232                Discharge Planning   KIT:      Code Status: Full Code   Is the patient medically ready for discharge?:     Reason for patient still in hospital (select all that apply): Patient new problem and Pending disposition  Discharge Plan A: Home   Discharge Delays: None known at this time              Sarika Madrid MD  Department of Hospital Medicine   [facility] - Med Surg

## 2022-11-23 NOTE — PLAN OF CARE
TX COMPLETED: facilitated bed mobility I, transfers with CGA, ambulated 75 ft x 2 with RW. Pt WB status upgraded to WBAT with sx shoe per MD Morrow. Recommend HHPT

## 2022-11-23 NOTE — ASSESSMENT & PLAN NOTE
--continue basal insulin at 20 units QHS,continue AM insulin 10 u   --continue SSI, prandial insulin therapy-aspart 5 TIDWM  --Accuchecks ACQHS  - BG goal 140-180 for optimal wound healing

## 2022-11-23 NOTE — PLAN OF CARE
Plan of care reviewed with patient with verbal understanding. Chart and orders reviewed.  Pt remains free from falls this shift, Safety precautions in place, Pt refuses bed alarm.   Pt currently resting comfortably in bed. Pain controlled with po medication(see emar for pain admin).  Hourly rounding with bed in lowest position, side rails up, call light in reach.  Will continue to monitor until end of shift.   During lab collecting, the printer would not print the labels,  the printed order was scanned but it would not work. Sent specimen with an ID sticker along with printed order.      Problem: Adult Inpatient Plan of Care  Goal: Plan of Care Review  Outcome: Ongoing, Progressing     Problem: Diabetes Comorbidity  Goal: Blood Glucose Level Within Targeted Range  Outcome: Ongoing, Progressing

## 2022-11-23 NOTE — SUBJECTIVE & OBJECTIVE
Interval History: NAEON. Pt reports he feels ok, continues to have watery diarrhea. Denies abd discomfort, nausea, vomiting. Will r/o Cdif.                                         Review of Systems  Objective:     Vital Signs (Most Recent):  Temp: 98.1 °F (36.7 °C) (11/23/22 1154)  Pulse: 78 (11/23/22 1154)  Resp: 16 (11/23/22 1154)  BP: (!) 151/72 (11/23/22 1154)  SpO2: 100 % (11/23/22 1154)   Vital Signs (24h Range):  Temp:  [97.7 °F (36.5 °C)-98.5 °F (36.9 °C)] 98.1 °F (36.7 °C)  Pulse:  [75-84] 78  Resp:  [16-19] 16  SpO2:  [98 %-100 %] 100 %  BP: (125-151)/(65-72) 151/72     Weight: 127.6 kg (281 lb 4.9 oz)  Body mass index is 35.16 kg/m².    Intake/Output Summary (Last 24 hours) at 11/23/2022 1239  Last data filed at 11/23/2022 0800  Gross per 24 hour   Intake 1243.33 ml   Output --   Net 1243.33 ml      Physical Exam  Vitals and nursing note reviewed.   Constitutional:       General: He is not in acute distress.     Appearance: He is obese. He is not ill-appearing.   Cardiovascular:      Rate and Rhythm: Normal rate and regular rhythm.      Heart sounds: No murmur heard.    No friction rub. No gallop.   Pulmonary:      Effort: Pulmonary effort is normal.      Breath sounds: Normal breath sounds. No wheezing, rhonchi or rales.   Abdominal:      General: Bowel sounds are normal. There is no distension.      Palpations: Abdomen is soft.      Tenderness: There is no abdominal tenderness. There is no guarding or rebound.   Musculoskeletal:      Comments: B/l feet with ACE dressings in place, wound vac to RLE    Neurological:      Mental Status: He is alert and oriented to person, place, and time. Mental status is at baseline.       Significant Labs: All pertinent labs within the past 24 hours have been reviewed.    Significant Imaging: I have reviewed all pertinent imaging results/findings within the past 24 hours.

## 2022-11-23 NOTE — PT/OT/SLP PROGRESS
"Physical Therapy  Treatment    Joseph Paris Jr.   MRN: [id number]   Admitting Diagnosis: Bacteremia due to group B Streptococcus    PT Received On: 11/17/22  PT Start Time: 1305     PT Stop Time: 1330    PT Total Time (min): 25 min       Billable Minutes:  Gait Training 10 and Therapeutic Exercise 15    Treatment Type: Treatment  PT/PTA: PT     PTA Visit Number: 0       General Precautions: Standard, fall, contact  Orthopedic Precautions: LLE weight bearing as tolerated (with sx shoes)   Braces: N/A  Respiratory Status: Room air    Spiritual, Cultural Beliefs, Taoist Practices, Values that Affect Care: no    Subjective:  Communicated with nursing prior to session.  Pt agreeable to PT services "I'm ready to get out of here" and "I don't want to be in here for Thanksgiving"    Pain/Comfort  Pain Rating 1: 0/10    Objective:   Patient found with: peripheral IV, wound vac, PICC line    Functional Mobility:  Bed Mobility: independent       Transfers: facilitated sit<>stand transfers with CGA and height of bed elevated to accommodate for height. Stand pivot transfers with CGA and RW.       Gait: Gait training with RW 75ft x 2 CGA. Demonstrates flexed posture, wide LAURITA, decreased stride length; intermittent standing rest breaks       Treatment and Education:  Facilitated seated BLE exercises to promote strength needed for gait and transfers. Exercises included AP, LAQ, marching, and hip abd/add x 15 reps.      AM-PAC 6 CLICK MOBILITY  How much help from another person does this patient currently need?   1 = Unable, Total/Dependent Assistance  2 = A lot, Maximum/Moderate Assistance  3 = A little, Minimum/Contact Guard/Supervision  4 = None, Modified Ford/Independent    Turning over in bed (including adjusting bedclothes, sheets and blankets)?: 4  Sitting down on and standing up from a chair with arms (e.g., wheelchair, bedside commode, etc.): 3  Moving from lying on back to sitting on the side of the bed?: 4  Moving " to and from a bed to a chair (including a wheelchair)?: 3  Need to walk in hospital room?: 3  Climbing 3-5 steps with a railing?: 1  Basic Mobility Total Score: 18    AM-PAC Raw Score CMS G-Code Modifier Level of Impairment Assistance   6 % Total / Unable   7 - 9 CM 80 - 100% Maximal Assist   10 - 14 CL 60 - 80% Moderate Assist   15 - 19 CK 40 - 60% Moderate Assist   20 - 22 CJ 20 - 40% Minimal Assist   23 CI 1-20% SBA / CGA   24 CH 0% Independent/ Mod I     Patient left supine with all lines intact, call button in reach, and nursing notified.    Assessment:  Joseph Paris Jr. is a 52 y.o. male with a medical diagnosis of Bacteremia due to group B Streptococcus and presents with impairments related to I&D with wound vac placement secondary to infection. Pt demonstrates ability to tolerate all functional tasks with no pain and able to tolerate increased distances with upgrading of WB status. Pt will benefit from continued PT services to address deficits and progress toward baseline.    Rehab identified problem list/impairments: Rehab identified problem list/impairments: weakness, impaired endurance, impaired functional mobility, gait instability, decreased safety awareness, decreased lower extremity function, impaired sensation    Rehab potential is good.    Activity tolerance: Good    Discharge recommendations: Discharge Facility/Level of Care Needs: home health PT     Barriers to discharge:      Equipment recommendations: Equipment Needed After Discharge: walker, rolling     GOALS:   Multidisciplinary Problems       Physical Therapy Goals          Problem: Physical Therapy    Goal Priority Disciplines Outcome Goal Variances Interventions   Physical Therapy Goal     PT, PT/OT Ongoing, Progressing     Description: Pt will perform bed mobility independently in order to participate in EOB activity.  Pt will perform transfers independently in order to participate in OOB activity.   Pt will ambulate 100ft mod I  with LRAD in order to participate in daily tasks adhering to NWB status to LLE                       PLAN:    Patient to be seen 3 x/week  to address the above listed problems via gait training, therapeutic activities, therapeutic exercises, neuromuscular re-education  Plan of Care expires: 11/30/22  Plan of Care reviewed with: patient         11/22/2022

## 2022-11-23 NOTE — ASSESSMENT & PLAN NOTE
- Cr initially improved with fluids, now uptrending again. ?IVVD due to diarrhea vs. ATN in setting of Vanc use   - start gentle IV hydration, encourage PO intake   - monitor BMP; renally dose meds

## 2022-11-24 PROBLEM — A48.0 GAS GANGRENE OF FOOT: Status: RESOLVED | Noted: 2022-11-13 | Resolved: 2022-11-24

## 2022-11-24 PROBLEM — D64.9 ACUTE ON CHRONIC ANEMIA: Status: ACTIVE | Noted: 2022-11-24

## 2022-11-24 PROBLEM — A41.9 SEVERE SEPSIS: Status: RESOLVED | Noted: 2022-11-12 | Resolved: 2022-11-24

## 2022-11-24 PROBLEM — R65.20 SEVERE SEPSIS: Status: RESOLVED | Noted: 2022-11-12 | Resolved: 2022-11-24

## 2022-11-24 LAB
ANION GAP SERPL CALC-SCNC: 7 MMOL/L (ref 8–16)
BASOPHILS # BLD AUTO: 0.03 K/UL (ref 0–0.2)
BASOPHILS NFR BLD: 0.4 % (ref 0–1.9)
BUN SERPL-MCNC: 13 MG/DL (ref 6–20)
CALCIUM SERPL-MCNC: 8 MG/DL (ref 8.7–10.5)
CHLORIDE SERPL-SCNC: 102 MMOL/L (ref 95–110)
CO2 SERPL-SCNC: 26 MMOL/L (ref 23–29)
CREAT SERPL-MCNC: 1.6 MG/DL (ref 0.5–1.4)
CREAT SERPL-MCNC: 1.6 MG/DL (ref 0.5–1.4)
CREAT UR-MCNC: 68.6 MG/DL (ref 23–375)
DIFFERENTIAL METHOD: ABNORMAL
EOSINOPHIL # BLD AUTO: 0.3 K/UL (ref 0–0.5)
EOSINOPHIL NFR BLD: 3.2 % (ref 0–8)
ERYTHROCYTE [DISTWIDTH] IN BLOOD BY AUTOMATED COUNT: 13.2 % (ref 11.5–14.5)
EST. GFR  (NO RACE VARIABLE): 51 ML/MIN/1.73 M^2
EST. GFR  (NO RACE VARIABLE): 51 ML/MIN/1.73 M^2
GLUCOSE SERPL-MCNC: 217 MG/DL (ref 70–110)
HCT VFR BLD AUTO: 23.2 % (ref 40–54)
HGB BLD-MCNC: 7.1 G/DL (ref 14–18)
IMM GRANULOCYTES # BLD AUTO: 0.06 K/UL (ref 0–0.04)
IMM GRANULOCYTES NFR BLD AUTO: 0.8 % (ref 0–0.5)
LYMPHOCYTES # BLD AUTO: 2.1 K/UL (ref 1–4.8)
LYMPHOCYTES NFR BLD: 27.2 % (ref 18–48)
MAGNESIUM SERPL-MCNC: 1.8 MG/DL (ref 1.6–2.6)
MCH RBC QN AUTO: 31.7 PG (ref 27–31)
MCHC RBC AUTO-ENTMCNC: 30.6 G/DL (ref 32–36)
MCV RBC AUTO: 104 FL (ref 82–98)
MONOCYTES # BLD AUTO: 0.6 K/UL (ref 0.3–1)
MONOCYTES NFR BLD: 7.3 % (ref 4–15)
NEUTROPHILS # BLD AUTO: 4.8 K/UL (ref 1.8–7.7)
NEUTROPHILS NFR BLD: 61.1 % (ref 38–73)
NRBC BLD-RTO: 0 /100 WBC
PLATELET # BLD AUTO: 449 K/UL (ref 150–450)
PMV BLD AUTO: 9.2 FL (ref 9.2–12.9)
POCT GLUCOSE: 142 MG/DL (ref 70–110)
POCT GLUCOSE: 152 MG/DL (ref 70–110)
POCT GLUCOSE: 211 MG/DL (ref 70–110)
POCT GLUCOSE: 310 MG/DL (ref 70–110)
POTASSIUM SERPL-SCNC: 3.9 MMOL/L (ref 3.5–5.1)
RBC # BLD AUTO: 2.24 M/UL (ref 4.6–6.2)
SODIUM SERPL-SCNC: 135 MMOL/L (ref 136–145)
SODIUM UR-SCNC: 56 MMOL/L (ref 20–250)
VANCOMYCIN SERPL-MCNC: 18 UG/ML
WBC # BLD AUTO: 7.86 K/UL (ref 3.9–12.7)

## 2022-11-24 PROCEDURE — 27000207 HC ISOLATION

## 2022-11-24 PROCEDURE — 63600175 PHARM REV CODE 636 W HCPCS: Performed by: INTERNAL MEDICINE

## 2022-11-24 PROCEDURE — 63600175 PHARM REV CODE 636 W HCPCS: Performed by: PODIATRIST

## 2022-11-24 PROCEDURE — 85025 COMPLETE CBC W/AUTO DIFF WBC: CPT | Performed by: INTERNAL MEDICINE

## 2022-11-24 PROCEDURE — 21400001 HC TELEMETRY ROOM

## 2022-11-24 PROCEDURE — 25000003 PHARM REV CODE 250: Performed by: PODIATRIST

## 2022-11-24 PROCEDURE — 80048 BASIC METABOLIC PNL TOTAL CA: CPT | Performed by: INTERNAL MEDICINE

## 2022-11-24 PROCEDURE — 83735 ASSAY OF MAGNESIUM: CPT | Performed by: INTERNAL MEDICINE

## 2022-11-24 PROCEDURE — 84300 ASSAY OF URINE SODIUM: CPT | Performed by: INTERNAL MEDICINE

## 2022-11-24 PROCEDURE — 25000003 PHARM REV CODE 250: Performed by: STUDENT IN AN ORGANIZED HEALTH CARE EDUCATION/TRAINING PROGRAM

## 2022-11-24 PROCEDURE — 80202 ASSAY OF VANCOMYCIN: CPT | Performed by: INTERNAL MEDICINE

## 2022-11-24 PROCEDURE — 25000003 PHARM REV CODE 250: Performed by: INTERNAL MEDICINE

## 2022-11-24 PROCEDURE — 82570 ASSAY OF URINE CREATININE: CPT | Performed by: INTERNAL MEDICINE

## 2022-11-24 PROCEDURE — 25000003 PHARM REV CODE 250: Performed by: NURSE PRACTITIONER

## 2022-11-24 RX ADMIN — AMITRIPTYLINE HYDROCHLORIDE 50 MG: 50 TABLET, FILM COATED ORAL at 08:11

## 2022-11-24 RX ADMIN — GABAPENTIN 800 MG: 400 CAPSULE ORAL at 08:11

## 2022-11-24 RX ADMIN — Medication 1 TABLET: at 12:11

## 2022-11-24 RX ADMIN — INSULIN DETEMIR 20 UNITS: 100 INJECTION, SOLUTION SUBCUTANEOUS at 08:11

## 2022-11-24 RX ADMIN — ENOXAPARIN SODIUM 40 MG: 40 INJECTION SUBCUTANEOUS at 04:11

## 2022-11-24 RX ADMIN — AMLODIPINE BESYLATE 5 MG: 5 TABLET ORAL at 08:11

## 2022-11-24 RX ADMIN — INSULIN ASPART 5 UNITS: 100 INJECTION, SOLUTION INTRAVENOUS; SUBCUTANEOUS at 04:11

## 2022-11-24 RX ADMIN — INSULIN ASPART 5 UNITS: 100 INJECTION, SOLUTION INTRAVENOUS; SUBCUTANEOUS at 08:11

## 2022-11-24 RX ADMIN — GABAPENTIN 400 MG: 400 CAPSULE ORAL at 08:11

## 2022-11-24 RX ADMIN — PRAVASTATIN SODIUM 20 MG: 20 TABLET ORAL at 08:11

## 2022-11-24 RX ADMIN — Medication 1 TABLET: at 04:11

## 2022-11-24 RX ADMIN — PANTOPRAZOLE SODIUM 40 MG: 40 TABLET, DELAYED RELEASE ORAL at 08:11

## 2022-11-24 RX ADMIN — INSULIN ASPART 5 UNITS: 100 INJECTION, SOLUTION INTRAVENOUS; SUBCUTANEOUS at 12:11

## 2022-11-24 RX ADMIN — INSULIN ASPART 8 UNITS: 100 INJECTION, SOLUTION INTRAVENOUS; SUBCUTANEOUS at 05:11

## 2022-11-24 RX ADMIN — VANCOMYCIN HYDROCHLORIDE 2000 MG: 10 INJECTION, POWDER, LYOPHILIZED, FOR SOLUTION INTRAVENOUS at 08:11

## 2022-11-24 RX ADMIN — Medication 1 TABLET: at 08:11

## 2022-11-24 NOTE — SUBJECTIVE & OBJECTIVE
Interval History: NAEON. Pt awake, alert. Has no complaints today. Has had no episodes of diarrhea this AM. Denies CP, SOB, abd discomfort, leg pain.     Review of Systems  Objective:     Vital Signs (Most Recent):  Temp: 98.1 °F (36.7 °C) (11/24/22 1131)  Pulse: 79 (11/24/22 1131)  Resp: 18 (11/24/22 1131)  BP: (!) 161/76 (11/24/22 1131)  SpO2: 96 % (11/24/22 1131)   Vital Signs (24h Range):  Temp:  [96.6 °F (35.9 °C)-98.7 °F (37.1 °C)] 98.1 °F (36.7 °C)  Pulse:  [79-87] 79  Resp:  [17-20] 18  SpO2:  [94 %-100 %] 96 %  BP: (143-178)/(74-86) 161/76     Weight: 127.6 kg (281 lb 4.9 oz)  Body mass index is 35.16 kg/m².    Intake/Output Summary (Last 24 hours) at 11/24/2022 1417  Last data filed at 11/24/2022 0739  Gross per 24 hour   Intake 678.87 ml   Output --   Net 678.87 ml      Physical Exam  Vitals and nursing note reviewed.   Constitutional:       General: He is not in acute distress.     Appearance: He is obese. He is not ill-appearing.   Cardiovascular:      Rate and Rhythm: Normal rate and regular rhythm.      Heart sounds: No murmur heard.    No friction rub. No gallop.   Pulmonary:      Effort: Pulmonary effort is normal.      Breath sounds: Normal breath sounds. No wheezing, rhonchi or rales.   Abdominal:      General: Bowel sounds are normal. There is no distension.      Palpations: Abdomen is soft.      Tenderness: There is no abdominal tenderness. There is no guarding or rebound.   Musculoskeletal:      Comments ACE dressings in place to bilat feet, wound vac to RLE    Neurological:      Mental Status: He is alert and oriented to person, place, and time. Mental status is at baseline.     Significant Labs: All pertinent labs within the past 24 hours have been reviewed.    Significant Imaging: I have reviewed all pertinent imaging results/findings within the past 24 hours.

## 2022-11-24 NOTE — ASSESSMENT & PLAN NOTE
Likely antibiotic associated, pt afebrile and without leukocytosis   - Cdif pending   - continue probiotic, hold lomotil for now

## 2022-11-24 NOTE — PROGRESS NOTES
[facility] Medicine  Progress Note    Patient Name: Joseph Paris Jr.  MRN: [id number]  Patient Class: IP- Inpatient   Admission Date: 11/12/2022  Length of Stay: 11 days  Attending Physician: Sarika Madrid MD  Primary Care Provider: Thong Hidalgo Jr, MD        Subjective:     Principal Problem:Bacteremia due to group B Streptococcus        HPI:  52 y.o. male patient with a PMHx of HTN, DM, and CAD presents to the Emergency Department for evaluation of Bilateral Feet Swelling which onset gradually within the past week. The pt last saw their wound care three weeks ago. The pt lost his health insurance coverage and decided to take care of the wounds himself. The pt self drained pus from his wounds and used a heated water massager on his feet; reports feet look more infected than before. Symptoms are constant and moderate in severity. No mitigating or exacerbating factors reported. Associated sxs include fever and SOB. Patient denies any CP, N/V, weakness, dysuria, and all other sxs at this time      Overview/Hospital Course:  S/p I&D of foot per Podiatry, recommended MRI and angio with possible referral to vascular surgery. Concerns for viability of flap due to appearance of wounds. Will likely need to return to the OR later this week. Wound cultures collected during procedure, Blood culture: gram positive cocci in chains in both bottles, adjusted antibiotic regimen, added vancomycin. On 11/15- Blood cultures ultimately grew MDR group B strep, but sensitive to both Rocephin and Vancomycin. Staphylococcus speciated as well in blood, but sensitivities still pending. Angiogram ordered by Vascular surgery tentatively scheduled to be done on 11/15. Cannulization in R femoral artery successful. Patient reports notable improvement in his limb swelling and pain immediately after procedure. Further debridement tentatively scheduled for 11/17. Tolerated procedure well. Wound vac placed at that time as  well. PICC line inserted on 11/18/22. Definitive antibiotic recs placed by ID (see their note for specifics). Total duration of therapy 6 weeks.  Wound care following for wound vac management. Awaiting insurance approval for home abx, wound vac .      Interval History: NAEON. Pt awake, alert. Has no complaints today. Has had no episodes of diarrhea this AM. Denies CP, SOB, abd discomfort, leg pain.     Review of Systems  Objective:     Vital Signs (Most Recent):  Temp: 98.1 °F (36.7 °C) (11/24/22 1131)  Pulse: 79 (11/24/22 1131)  Resp: 18 (11/24/22 1131)  BP: (!) 161/76 (11/24/22 1131)  SpO2: 96 % (11/24/22 1131)   Vital Signs (24h Range):  Temp:  [96.6 °F (35.9 °C)-98.7 °F (37.1 °C)] 98.1 °F (36.7 °C)  Pulse:  [79-87] 79  Resp:  [17-20] 18  SpO2:  [94 %-100 %] 96 %  BP: (143-178)/(74-86) 161/76     Weight: 127.6 kg (281 lb 4.9 oz)  Body mass index is 35.16 kg/m².    Intake/Output Summary (Last 24 hours) at 11/24/2022 1417  Last data filed at 11/24/2022 0739  Gross per 24 hour   Intake 678.87 ml   Output --   Net 678.87 ml      Physical Exam  Vitals and nursing note reviewed.   Constitutional:       General: He is not in acute distress.     Appearance: He is obese. He is not ill-appearing.   Cardiovascular:      Rate and Rhythm: Normal rate and regular rhythm.      Heart sounds: No murmur heard.    No friction rub. No gallop.   Pulmonary:      Effort: Pulmonary effort is normal.      Breath sounds: Normal breath sounds. No wheezing, rhonchi or rales.   Abdominal:      General: Bowel sounds are normal. There is no distension.      Palpations: Abdomen is soft.      Tenderness: There is no abdominal tenderness. There is no guarding or rebound.   Musculoskeletal:      Comments ACE dressings in place to bilat feet, wound vac to RLE    Neurological:      Mental Status: He is alert and oriented to person, place, and time. Mental status is at baseline.     Significant Labs: All pertinent labs within the past 24 hours have  been reviewed.    Significant Imaging: I have reviewed all pertinent imaging results/findings within the past 24 hours.      Assessment/Plan:      * Bacteremia due to group B Streptococcus  - Continue Rocephin per ID   --ID following, recommending IV Daptomycin + Rocephin x 6 weeks  --ambulatory infusion orders placed for abx and wound vac, pending insurance/approval     Acute osteomyelitis of metatarsal bone of left foot  -s/p I&D on 11/13  -BC (11/13) grew group B strep- repeat cultures (11/14) no growth to date   -MRI of L FOOT: Large ulceration extending along 1st metatarsal shaft to 1st proximal phalanx. Abnormal bone marrow signal enhancement compatible with osteomyelitis.   -  vascular surgery consulted -Angiogram of bilateral LE's on 11/15 with Dr Berman. -successful recannulization of R femoral   -s/p  debridement and wound vac placement on 11/16 with Podiatry Dr. Augustine   - ID consulted; rec IV Dapto and Rocephin x 6 weeks EOT 12/29/22  -pending set up of wound vac and home IV abx due to issues with insurance       Bacteremia due to methicillin resistant Staphylococcus aureus  - Continue IV vanc per ID; dosing and monitoring of Vanc per pharmacy       TIERA (acute kidney injury)  - Cr initially improved with fluids, now uptrending again. ?IVVD due to diarrhea vs. ATN in setting of Vanc use   - obtain urine lytes, encourage PO intake    - monitor BMP; renally dose meds     Diarrhea  Likely antibiotic associated, pt afebrile and without leukocytosis   - Cdif pending   - continue probiotic, hold lomotil for now    Acute on chronic anemia  - macrocytic, pt asymptomatic  - obtain B12, folate, iron levels  - monitor CBC                               Decubitus ulcer of right heel  --wound care consulted and following  - continue local wound care       Diabetes mellitus  --continue basal insulin at 20 units QHS,increase AM insulin 15 u   --continue SSI, prandial insulin therapy-aspart 5 TIDWM  --Accuchecks  ACQHS  - BG goal 140-180 for optimal wound healing         VTE Risk Mitigation (From admission, onward)         Ordered     enoxaparin injection 40 mg  Daily         11/12/22 2232     IP VTE HIGH RISK PATIENT  Once         11/12/22 2232     Place sequential compression device  Until discontinued         11/12/22 2232                Discharge Planning   KIT:      Code Status: Full Code   Is the patient medically ready for discharge?:     Reason for patient still in hospital (select all that apply): Patient trending condition and Pending disposition  Discharge Plan A: Home   Discharge Delays: None known at this time              Sarika Madrid MD  Department of Hospital Medicine   '[place] - Med Surg

## 2022-11-24 NOTE — ASSESSMENT & PLAN NOTE
- Cr initially improved with fluids, now uptrending again. ?IVVD due to diarrhea vs. ATN in setting of Vanc use   - obtain urine lytes, encourage PO intake    - monitor BMP; renally dose meds

## 2022-11-24 NOTE — ASSESSMENT & PLAN NOTE
-s/p I&D on 11/13  -BC (11/13) grew group B strep- repeat cultures (11/14) no growth to date   -MRI of L FOOT: Large ulceration extending along 1st metatarsal shaft to 1st proximal phalanx. Abnormal bone marrow signal enhancement compatible with osteomyelitis.   -  vascular surgery consulted -Angiogram of bilateral LE's on 11/15 with Dr Berman. -successful recannulization of R femoral   -s/p  debridement and wound vac placement on 11/16 with Podiatry Dr. Augustine   - ID consulted; rec IV Dapto and Rocephin x 6 weeks EOT 12/29/22  -pending set up of wound vac and home IV abx due to issues with insurance

## 2022-11-24 NOTE — ASSESSMENT & PLAN NOTE
--continue basal insulin at 20 units QHS,increase AM insulin 15 u   --continue SSI, prandial insulin therapy-aspart 5 TIDWM  --Accuchecks ACQHS  - BG goal 140-180 for optimal wound healing

## 2022-11-24 NOTE — PLAN OF CARE
Plan of care reviewed with patient with verbal understanding. Chart and orders reviewed.  Pt remains free from falls this shift, Safety precautions in place, refuses bed alarm.   Pt currently resting comfortably in bed. Hourly rounding with bed in lowest position, call light in reach.  Will continue to monitor until end of shift.       Problem: Adult Inpatient Plan of Care  Goal: Optimal Comfort and Wellbeing  Outcome: Ongoing, Progressing     Problem: Glycemic Control Impaired (Sepsis/Septic Shock)  Goal: Blood Glucose Level Within Desired Range  Outcome: Ongoing, Progressing

## 2022-11-24 NOTE — ASSESSMENT & PLAN NOTE
- macrocytic, pt asymptomatic  - obtain B12, folate, iron levels  - monitor CBC

## 2022-11-25 LAB
ABO + RH BLD: NORMAL
ANION GAP SERPL CALC-SCNC: 8 MMOL/L (ref 8–16)
BASOPHILS # BLD AUTO: 0.04 K/UL (ref 0–0.2)
BASOPHILS NFR BLD: 0.6 % (ref 0–1.9)
BLD GP AB SCN CELLS X3 SERPL QL: NORMAL
BLD PROD TYP BPU: NORMAL
BLOOD UNIT EXPIRATION DATE: NORMAL
BLOOD UNIT TYPE CODE: 5100
BLOOD UNIT TYPE: NORMAL
BUN SERPL-MCNC: 14 MG/DL (ref 6–20)
CALCIUM SERPL-MCNC: 7.7 MG/DL (ref 8.7–10.5)
CHLORIDE SERPL-SCNC: 100 MMOL/L (ref 95–110)
CO2 SERPL-SCNC: 26 MMOL/L (ref 23–29)
CODING SYSTEM: NORMAL
CREAT SERPL-MCNC: 1.6 MG/DL (ref 0.5–1.4)
CREAT SERPL-MCNC: 1.6 MG/DL (ref 0.5–1.4)
DIFFERENTIAL METHOD: ABNORMAL
DISPENSE STATUS: NORMAL
EOSINOPHIL # BLD AUTO: 0.2 K/UL (ref 0–0.5)
EOSINOPHIL NFR BLD: 3.5 % (ref 0–8)
ERYTHROCYTE [DISTWIDTH] IN BLOOD BY AUTOMATED COUNT: 13.4 % (ref 11.5–14.5)
EST. GFR  (NO RACE VARIABLE): 51 ML/MIN/1.73 M^2
EST. GFR  (NO RACE VARIABLE): 51 ML/MIN/1.73 M^2
FERRITIN SERPL-MCNC: 244 NG/ML (ref 20–300)
FOLATE SERPL-MCNC: 5.8 NG/ML (ref 4–24)
GLUCOSE SERPL-MCNC: 159 MG/DL (ref 70–110)
HCT VFR BLD AUTO: 21 % (ref 40–54)
HGB BLD-MCNC: 6.5 G/DL (ref 14–18)
IMM GRANULOCYTES # BLD AUTO: 0.04 K/UL (ref 0–0.04)
IMM GRANULOCYTES NFR BLD AUTO: 0.6 % (ref 0–0.5)
IRON SERPL-MCNC: 51 UG/DL (ref 45–160)
LYMPHOCYTES # BLD AUTO: 2.1 K/UL (ref 1–4.8)
LYMPHOCYTES NFR BLD: 33.3 % (ref 18–48)
MCH RBC QN AUTO: 31.7 PG (ref 27–31)
MCHC RBC AUTO-ENTMCNC: 31 G/DL (ref 32–36)
MCV RBC AUTO: 102 FL (ref 82–98)
MONOCYTES # BLD AUTO: 0.5 K/UL (ref 0.3–1)
MONOCYTES NFR BLD: 8.5 % (ref 4–15)
NEUTROPHILS # BLD AUTO: 3.3 K/UL (ref 1.8–7.7)
NEUTROPHILS NFR BLD: 53.5 % (ref 38–73)
NRBC BLD-RTO: 0 /100 WBC
NUM UNITS TRANS PACKED RBC: NORMAL
PLATELET # BLD AUTO: 396 K/UL (ref 150–450)
PMV BLD AUTO: 9.3 FL (ref 9.2–12.9)
POCT GLUCOSE: 169 MG/DL (ref 70–110)
POCT GLUCOSE: 176 MG/DL (ref 70–110)
POCT GLUCOSE: 224 MG/DL (ref 70–110)
POCT GLUCOSE: 252 MG/DL (ref 70–110)
POTASSIUM SERPL-SCNC: 3.9 MMOL/L (ref 3.5–5.1)
RBC # BLD AUTO: 2.05 M/UL (ref 4.6–6.2)
SATURATED IRON: 20 % (ref 20–50)
SODIUM SERPL-SCNC: 134 MMOL/L (ref 136–145)
TOTAL IRON BINDING CAPACITY: 255 UG/DL (ref 250–450)
TRANSFERRIN SERPL-MCNC: 172 MG/DL (ref 200–375)
VANCOMYCIN SERPL-MCNC: 18.1 UG/ML
VIT B12 SERPL-MCNC: 853 PG/ML (ref 210–950)
WBC # BLD AUTO: 6.25 K/UL (ref 3.9–12.7)

## 2022-11-25 PROCEDURE — 25000003 PHARM REV CODE 250: Performed by: PODIATRIST

## 2022-11-25 PROCEDURE — 36415 COLL VENOUS BLD VENIPUNCTURE: CPT | Performed by: INTERNAL MEDICINE

## 2022-11-25 PROCEDURE — 84466 ASSAY OF TRANSFERRIN: CPT | Performed by: INTERNAL MEDICINE

## 2022-11-25 PROCEDURE — 27000207 HC ISOLATION

## 2022-11-25 PROCEDURE — 82746 ASSAY OF FOLIC ACID SERUM: CPT | Performed by: INTERNAL MEDICINE

## 2022-11-25 PROCEDURE — 63600175 PHARM REV CODE 636 W HCPCS: Performed by: INTERNAL MEDICINE

## 2022-11-25 PROCEDURE — P9016 RBC LEUKOCYTES REDUCED: HCPCS | Performed by: INTERNAL MEDICINE

## 2022-11-25 PROCEDURE — 63600175 PHARM REV CODE 636 W HCPCS: Performed by: PODIATRIST

## 2022-11-25 PROCEDURE — 86901 BLOOD TYPING SEROLOGIC RH(D): CPT | Performed by: INTERNAL MEDICINE

## 2022-11-25 PROCEDURE — 85025 COMPLETE CBC W/AUTO DIFF WBC: CPT | Performed by: INTERNAL MEDICINE

## 2022-11-25 PROCEDURE — 86920 COMPATIBILITY TEST SPIN: CPT | Performed by: INTERNAL MEDICINE

## 2022-11-25 PROCEDURE — 97116 GAIT TRAINING THERAPY: CPT | Mod: CQ

## 2022-11-25 PROCEDURE — 25000003 PHARM REV CODE 250: Performed by: NURSE PRACTITIONER

## 2022-11-25 PROCEDURE — 21400001 HC TELEMETRY ROOM

## 2022-11-25 PROCEDURE — 36430 TRANSFUSION BLD/BLD COMPNT: CPT

## 2022-11-25 PROCEDURE — 82728 ASSAY OF FERRITIN: CPT | Performed by: INTERNAL MEDICINE

## 2022-11-25 PROCEDURE — 80202 ASSAY OF VANCOMYCIN: CPT | Performed by: INTERNAL MEDICINE

## 2022-11-25 PROCEDURE — 25000003 PHARM REV CODE 250: Performed by: STUDENT IN AN ORGANIZED HEALTH CARE EDUCATION/TRAINING PROGRAM

## 2022-11-25 PROCEDURE — 97530 THERAPEUTIC ACTIVITIES: CPT | Mod: CQ

## 2022-11-25 PROCEDURE — 25000003 PHARM REV CODE 250: Performed by: INTERNAL MEDICINE

## 2022-11-25 PROCEDURE — 82607 VITAMIN B-12: CPT | Performed by: INTERNAL MEDICINE

## 2022-11-25 PROCEDURE — 80048 BASIC METABOLIC PNL TOTAL CA: CPT | Performed by: INTERNAL MEDICINE

## 2022-11-25 RX ORDER — HYDROCODONE BITARTRATE AND ACETAMINOPHEN 500; 5 MG/1; MG/1
TABLET ORAL
Status: DISCONTINUED | OUTPATIENT
Start: 2022-11-25 | End: 2022-11-29 | Stop reason: HOSPADM

## 2022-11-25 RX ADMIN — PANTOPRAZOLE SODIUM 40 MG: 40 TABLET, DELAYED RELEASE ORAL at 08:11

## 2022-11-25 RX ADMIN — ACETAMINOPHEN 650 MG: 325 TABLET ORAL at 04:11

## 2022-11-25 RX ADMIN — AMITRIPTYLINE HYDROCHLORIDE 50 MG: 50 TABLET, FILM COATED ORAL at 08:11

## 2022-11-25 RX ADMIN — AMLODIPINE BESYLATE 5 MG: 5 TABLET ORAL at 08:11

## 2022-11-25 RX ADMIN — CEFTRIAXONE 2 G: 2 INJECTION, POWDER, FOR SOLUTION INTRAMUSCULAR; INTRAVENOUS at 01:11

## 2022-11-25 RX ADMIN — ACETAMINOPHEN 650 MG: 325 TABLET ORAL at 08:11

## 2022-11-25 RX ADMIN — ENOXAPARIN SODIUM 40 MG: 40 INJECTION SUBCUTANEOUS at 04:11

## 2022-11-25 RX ADMIN — INSULIN ASPART 3 UNITS: 100 INJECTION, SOLUTION INTRAVENOUS; SUBCUTANEOUS at 08:11

## 2022-11-25 RX ADMIN — INSULIN ASPART 2 UNITS: 100 INJECTION, SOLUTION INTRAVENOUS; SUBCUTANEOUS at 11:11

## 2022-11-25 RX ADMIN — GABAPENTIN 400 MG: 400 CAPSULE ORAL at 08:11

## 2022-11-25 RX ADMIN — INSULIN DETEMIR 20 UNITS: 100 INJECTION, SOLUTION SUBCUTANEOUS at 08:11

## 2022-11-25 RX ADMIN — PRAVASTATIN SODIUM 20 MG: 20 TABLET ORAL at 08:11

## 2022-11-25 RX ADMIN — INSULIN ASPART 5 UNITS: 100 INJECTION, SOLUTION INTRAVENOUS; SUBCUTANEOUS at 04:11

## 2022-11-25 RX ADMIN — VANCOMYCIN HYDROCHLORIDE 2000 MG: 10 INJECTION, POWDER, LYOPHILIZED, FOR SOLUTION INTRAVENOUS at 10:11

## 2022-11-25 RX ADMIN — INSULIN ASPART 5 UNITS: 100 INJECTION, SOLUTION INTRAVENOUS; SUBCUTANEOUS at 08:11

## 2022-11-25 RX ADMIN — MORPHINE SULFATE 2 MG: 2 INJECTION, SOLUTION INTRAMUSCULAR; INTRAVENOUS at 10:11

## 2022-11-25 RX ADMIN — INSULIN ASPART 5 UNITS: 100 INJECTION, SOLUTION INTRAVENOUS; SUBCUTANEOUS at 11:11

## 2022-11-25 RX ADMIN — Medication 1 TABLET: at 08:11

## 2022-11-25 RX ADMIN — INSULIN DETEMIR 15 UNITS: 100 INJECTION, SOLUTION SUBCUTANEOUS at 09:11

## 2022-11-25 RX ADMIN — GABAPENTIN 800 MG: 400 CAPSULE ORAL at 08:11

## 2022-11-25 RX ADMIN — INSULIN ASPART 4 UNITS: 100 INJECTION, SOLUTION INTRAVENOUS; SUBCUTANEOUS at 04:11

## 2022-11-25 RX ADMIN — Medication 1 TABLET: at 04:11

## 2022-11-25 NOTE — PT/OT/SLP PROGRESS
"Physical Therapy  Treatment    Joseph Paris Jr.   MRN: [id number]   Admitting Diagnosis: Bacteremia due to group B Streptococcus    PT Received On: 11/25/22  PT Start Time: 0905     PT Stop Time: 0935    PT Total Time (min): 30 min       Billable Minutes:  Gait Training 15 and Therapeutic Activity 15    Treatment Type: Treatment  PT/PTA: PTA     PTA Visit Number: 1       General Precautions: Standard, contact, fall (PER CHARGE NURSE, PATIENT OK TO LEAVE ROOM DUE TO CONTAINED INFECTION)  Orthopedic Precautions: RLE weight bearing as tolerated, LLE weight bearing as tolerated (WITH SURGICAL SHOES)   Braces: N/A  Respiratory Status: Room air    Spiritual, Cultural Beliefs, Sabianism Practices, Values that Affect Care: no    Subjective:  Communicated with charge nurse and completed Epic chart review prior to session.  Patient agreed to PT session.  "I'm just frustrated since I've been here so long."    Pain/Comfort  Pain Rating 1: 0/10  Pain Rating Post-Intervention 1: 0/10    Objective:   Patient found with: PICC line, wound vac    Supine > sit EOB: Independent    STS from EOB No AD: Independent    250ft No AD Independent (patient carried wound vac; good safety awareness throughout)    Stand pivot T/F to EOB: Independent    Completed x20 reps AROM TE to BLE: LAQ, Hip Flex, AP    Educated patient on importance of increased tolerance to upright position and direct impact on CV endurance and strength. Patient encouraged to sit up in chair for a minimum of 2 consecutive hours per day. Encouraged patient to perform AROM TE to BLE throughout the day within all available planes of motion. Encouraged patient to ambulate 1-2x more today and being released to ambulate independently on the unit. Patient verbalized understanding and agreed to comply.      AM-PAC 6 CLICK MOBILITY  How much help from another person does this patient currently need?   1 = Unable, Total/Dependent Assistance  2 = A lot, Maximum/Moderate Assistance  3 = A " little, Minimum/Contact Guard/Supervision  4 = None, Modified Patrick/Independent    Turning over in bed (including adjusting bedclothes, sheets and blankets)?: 4  Sitting down on and standing up from a chair with arms (e.g., wheelchair, bedside commode, etc.): 4  Moving from lying on back to sitting on the side of the bed?: 4  Moving to and from a bed to a chair (including a wheelchair)?: 4  Need to walk in hospital room?: 4  Climbing 3-5 steps with a railing?: 1  Basic Mobility Total Score: 21    AM-PAC Raw Score CMS G-Code Modifier Level of Impairment Assistance   6 % Total / Unable   7 - 9 CM 80 - 100% Maximal Assist   10 - 14 CL 60 - 80% Moderate Assist   15 - 19 CK 40 - 60% Moderate Assist   20 - 22 CJ 20 - 40% Minimal Assist   23 CI 1-20% SBA / CGA   24 CH 0% Independent/ Mod I     Patient left sitting edge of bed with call button in reach.    Assessment:  Joseph Paris Jr. is a 52 y.o. male with a medical diagnosis of Bacteremia due to group B Streptococcus and presents with overall decline in functional mobility. Patient would continue to benefit from skilled PT to address functional limitations listed below in order to return to PLOF/decrease caregiver burden. Patient demonstrated a significant improvement in functional mobility, endurance and gait quality. Patient is now able to ambulate independently without an AD as he has been cleared by podiatry to WBAT w/ surgical shoes. Plan to have PT see patient during next scheduled PT session to determine if patient is appropriate for D/C from PT services as he has met all goals established in PT POC.    Rehab identified problem list/impairments: Rehab identified problem list/impairments: impaired endurance, impaired sensation, decreased ROM, impaired skin    Rehab potential is good.    Activity tolerance: Good    Discharge recommendations: Discharge Facility/Level of Care Needs: home health PT     Barriers to discharge:      Equipment  recommendations: Equipment Needed After Discharge: none     GOALS:   Multidisciplinary Problems       Physical Therapy Goals          Problem: Physical Therapy    Goal Priority Disciplines Outcome Goal Variances Interventions   Physical Therapy Goal     PT, PT/OT Ongoing, Progressing     Description: Pt will perform bed mobility independently in order to participate in EOB activity.  Pt will perform transfers independently in order to participate in OOB activity.   Pt will ambulate 100ft mod I with LRAD in order to participate in daily tasks adhering to NWB status to LLE                       PLAN:    Patient to be seen 3 x/week  to address the above listed problems via gait training, therapeutic activities, therapeutic exercises  Plan of Care expires: 11/30/22  Plan of Care reviewed with: patient         11/25/2022

## 2022-11-25 NOTE — ASSESSMENT & PLAN NOTE
- macrocytic, iron studies fairly unremarkable  - B12/folate pending    -transfuse 1u PRBC today   - monitor CBC

## 2022-11-25 NOTE — PLAN OF CARE
Problem: Adult Inpatient Plan of Care  Goal: Plan of Care Review  Outcome: Ongoing, Progressing  Goal: Patient-Specific Goal (Individualized)  Outcome: Ongoing, Progressing  Goal: Absence of Hospital-Acquired Illness or Injury  Outcome: Ongoing, Progressing  Goal: Optimal Comfort and Wellbeing  Outcome: Ongoing, Progressing  Goal: Readiness for Transition of Care  Outcome: Ongoing, Progressing     Problem: Diabetes Comorbidity  Goal: Blood Glucose Level Within Targeted Range  Outcome: Ongoing, Progressing     Problem: Skin Injury Risk Increased  Goal: Skin Health and Integrity  Outcome: Ongoing, Progressing     Problem: Fall Injury Risk  Goal: Absence of Fall and Fall-Related Injury  Outcome: Ongoing, Progressing     Problem: Pain Acute  Goal: Acceptable Pain Control and Functional Ability  Outcome: Ongoing, Progressing     Problem: Infection  Goal: Absence of Infection Signs and Symptoms  Outcome: Ongoing, Progressing     Problem: Impaired Wound Healing  Goal: Optimal Wound Healing  Outcome: Ongoing, Progressing

## 2022-11-25 NOTE — PROGRESS NOTES
[facility] Medicine  Progress Note    Patient Name: Joseph Paris Jr.  MRN: [id number]  Patient Class: IP- Inpatient   Admission Date: 11/12/2022  Length of Stay: 12 days  Attending Physician: Sarika Madrid MD  Primary Care Provider: Thong Hidalgo Jr, MD        Subjective:     Principal Problem:Bacteremia due to group B Streptococcus    HPI:  52 y.o. male patient with a PMHx of HTN, DM, and CAD presents to the Emergency Department for evaluation of Bilateral Feet Swelling which onset gradually within the past week. The pt last saw their wound care three weeks ago. The pt lost his health insurance coverage and decided to take care of the wounds himself. The pt self drained pus from his wounds and used a heated water massager on his feet; reports feet look more infected than before. Symptoms are constant and moderate in severity. No mitigating or exacerbating factors reported. Associated sxs include fever and SOB. Patient denies any CP, N/V, weakness, dysuria, and all other sxs at this time    Overview/Hospital Course:  S/p I&D of foot per Podiatry, recommended MRI and angio with possible referral to vascular surgery. Concerns for viability of flap due to appearance of wounds. Will likely need to return to the OR later this week. Wound cultures collected during procedure, Blood culture: gram positive cocci in chains in both bottles, adjusted antibiotic regimen, added vancomycin. On 11/15- Blood cultures ultimately grew MDR group B strep, but sensitive to both Rocephin and Vancomycin. Staphylococcus speciated as well in blood, but sensitivities still pending. Angiogram ordered by Vascular surgery tentatively scheduled to be done on 11/15. Cannulization in R femoral artery successful. Patient reports notable improvement in his limb swelling and pain immediately after procedure. Further debridement tentatively scheduled for 11/17. Tolerated procedure well. Wound vac placed at that time as well.  PICC line inserted on 11/18/22. Definitive antibiotic recs placed by ID (see their note for specifics). Total duration of therapy 6 weeks.  Wound care following for wound vac management. Awaiting insurance approval for home abx, wound vac .    Interval History: NAEON. Hgb 6.5 this A M, 1u PRBC ordered. Pt awake, alert, in NAD. Reports diarrhea is almost resolved, last episode was yesterday. Stool watery, nonmelanotic, nonbloody. Denies nausea, vomiting, CP, SOB.     Review of Systems  Objective:     Vital Signs (Most Recent):  Temp: 97.8 °F (36.6 °C) (11/25/22 1139)  Pulse: 82 (11/25/22 1139)  Resp: 18 (11/25/22 1139)  BP: (!) 152/70 (11/25/22 1139)  SpO2: 99 % (11/25/22 1139)   Vital Signs (24h Range):  Temp:  [97.5 °F (36.4 °C)-98.6 °F (37 °C)] 97.8 °F (36.6 °C)  Pulse:  [76-89] 82  Resp:  [17-20] 18  SpO2:  [97 %-99 %] 99 %  BP: (125-170)/(67-80) 152/70     Weight: 129.5 kg (285 lb 7.9 oz)  Body mass index is 35.68 kg/m².  No intake or output data in the 24 hours ending 11/25/22 1339   Physical Exam  Vitals and nursing note reviewed.   Constitutional:       General: He is not in acute distress.     Appearance: Normal appearance.   Cardiovascular:      Rate and Rhythm: Normal rate and regular rhythm.   Pulmonary:      Comments: Breathing comfortably on RA, no wheezes   Abdominal:      General: Bowel sounds are normal. There is no distension.      Palpations: Abdomen is soft.      Tenderness: There is no abdominal tenderness. There is no guarding or rebound.   Musculoskeletal:      Comments: B/l feet with ACE dressings, R wound vac in place with serousang. Output   Neurological:      General: No focal deficit present.      Mental Status: He is alert and oriented to person, place, and time. Mental status is at baseline.       Significant Labs: All pertinent labs within the past 24 hours have been reviewed.    Significant Imaging: I have reviewed all pertinent imaging results/findings within the past 24  hours.      Assessment/Plan:      * Bacteremia due to group B Streptococcus  - Continue Rocephin per ID   --ID following, recommending IV Daptomycin + Rocephin x 6 weeks  --ambulatory infusion orders placed for abx and wound vac, pending insurance/approval     Acute osteomyelitis of metatarsal bone of left foot  -s/p I&D on 11/13  -BC (11/13) grew group B strep- repeat cultures (11/14) no growth to date   -MRI of L FOOT: Large ulceration extending along 1st metatarsal shaft to 1st proximal phalanx. Abnormal bone marrow signal enhancement compatible with osteomyelitis.   -  vascular surgery consulted -Angiogram of bilateral LE's on 11/15 with Dr Berman. -successful recannulization of R femoral   -s/p  debridement and wound vac placement on 11/16 with Podiatry Dr. Augustine   - ID consulted; rec IV Dapto and Rocephin x 6 weeks EOT 12/29/22  -pending set up of wound vac and home IV abx due to issues with insurance       Bacteremia due to methicillin resistant Staphylococcus aureus  - Continue IV vanc per ID; dosing and monitoring of Vanc per pharmacy       TIERA (acute kidney injury)  - Cr initially improved with fluids, now uptrending again. ?IVVD due to diarrhea vs. ATN in setting of Vanc use   - urine lytes c/w prerenal etiology- receiving 1u PRBC today   - monitor BMP; renally dose meds     Diarrhea  Likely antibiotic associated, pt afebrile and without leukocytosis   - Cdif cancelled as diarrhea improving   - continue probiotic, hold lomotil for now    Acute on chronic anemia  - macrocytic, iron studies fairly unremarkable  - B12/folate pending    -transfuse 1u PRBC today   - monitor CBC                               Decubitus ulcer of right heel  --wound care consulted and following  - continue local wound care       Diabetes mellitus  --continue basal insulin at 20 units QHS,continue AM insulin 15 u   --continue SSI, prandial insulin therapy-aspart 5 TIDWM  --Accuchecks ACQHS  - BG goal 140-180 for optimal wound  healing         VTE Risk Mitigation (From admission, onward)         Ordered     enoxaparin injection 40 mg  Daily         11/12/22 2232     IP VTE HIGH RISK PATIENT  Once         11/12/22 2232     Place sequential compression device  Until discontinued         11/12/22 2232                Discharge Planning   KIT:      Code Status: Full Code   Is the patient medically ready for discharge?:     Reason for patient still in hospital (select all that apply): Pending disposition  Discharge Plan A: Home   Discharge Delays: None known at this time    Sarika Madrid MD  Department of Hospital Medicine   [facility] Surg

## 2022-11-25 NOTE — ASSESSMENT & PLAN NOTE
Likely antibiotic associated, pt afebrile and without leukocytosis   - Cdif cancelled as diarrhea improving   - continue probiotic, hold lomotil for now

## 2022-11-25 NOTE — PLAN OF CARE
Plan of care discussed with pt. Pt verbalized understanding. Free from injury. No s/s of distress noted. Vitals stable. IV SL. Meds as ordered. Pain controlled. Diet tolerated. 1RBC transfused. Q2 hour rounding. No complaints. Will continue to monitor pt. 12 hour chart review.

## 2022-11-25 NOTE — ASSESSMENT & PLAN NOTE
- Cr initially improved with fluids, now uptrending again. ?IVVD due to diarrhea vs. ATN in setting of Vanc use   - urine lytes c/w prerenal etiology- receiving 1u PRBC today   - monitor BMP; renally dose meds

## 2022-11-25 NOTE — NURSING
Wound vac to the left foot changed. Pictures attached. Area around great toe slightly macerated. Area cleaned with saline and dried. Skin barrier applied to area and all surrounding areas. White foam placed over tendon and black sponge in wound bed. Patient tolerated well. Ace wrap covering per patient request. Betadine applied to eschar to the right heel and left heel eschar. Left heel measuring 2cm length and 2cm width. No leaks indicated to woun.   11/25/22 1040   WOCN Assessment   WOCN Total Time (mins) 60   Visit Date 11/25/22   Visit Time 1040   Consult Type Follow Up   WOCN Speciality Wound   WOCN List wound vac   Procedure wound vac   Intervention assessed;changed   Teaching on-going   Pressure Injury Prevention    Check Moisture Management Pad Done        Altered Skin Integrity Right Heel   No Date First Assessed or Time First Assessed found.   Altered Skin Integrity Present on Admission: yes  Side: Right  Location: Heel   Wound Image    Description of Altered Skin Integrity Full thickness tissue loss. Base is covered by slough and/or eschar in the wound bed   Dressing Appearance Dry;Clean;Intact   Drainage Amount None   Appearance Eschar   Tissue loss description Full thickness   Black (%), Wound Tissue Color 100 %   Red (%), Wound Tissue Color 0 %   Yellow (%), Wound Tissue Color 0 %   Periwound Area Dry   Wound Length (cm) 4 cm   Wound Width (cm) 4 cm   Wound Surface Area (cm^2) 16 cm^2   Care Applied:;Povidone iodine   Dressing Changed;Elastic bandage;Gauze        Incision/Site 11/17/22 0736 Left Foot   Date First Assessed/Time First Assessed: 11/17/22 0736   Side: Left  Location: Foot   Wound Image     Dressing Appearance Intact   Appearance Red;Tendon   Black (%), Wound Tissue Color 0 %   Red (%), Wound Tissue Color 80 %   Yellow (%), Wound Tissue Color 0 %   Periwound Area Macerated   Wound Edges Irregular   Wound Length (cm) 12 cm   Wound Width (cm) 7 cm   Wound Depth (cm) 1.5 cm   Wound Volume  (cm^3) 126 cm^3   Wound Surface Area (cm^2) 84 cm^2   Tunneling (depth (cm)/location) 0   Undermining (depth (cm)/location) 0   Care Cleansed with:;Sterile normal saline   Dressing Other (comment)  (wound vac)        Negative Pressure Wound Therapy  11/17/22 0805 Left anterior   Placement Date/Time: 11/17/22 0805   Side: Left  Orientation: anterior  Location: Foot   NPWT Type Vacuum Therapy   Therapy Setting NPWT Continuous therapy   Pressure Setting NPWT 125 mmHg   Sponges Inserted NPWT White;1;Black   Sponges Removed NPWT Black;White;1

## 2022-11-25 NOTE — SUBJECTIVE & OBJECTIVE
Interval History: NAEON. Hgb 6.5 this A M, 1u PRBC ordered. Pt awake, alert, in NAD. Reports diarrhea is almost resolved, last episode was yesterday. Stool watery, nonmelanotic, nonbloody. Denies nausea, vomiting, CP, SOB.     Review of Systems  Objective:     Vital Signs (Most Recent):  Temp: 97.8 °F (36.6 °C) (11/25/22 1139)  Pulse: 82 (11/25/22 1139)  Resp: 18 (11/25/22 1139)  BP: (!) 152/70 (11/25/22 1139)  SpO2: 99 % (11/25/22 1139)   Vital Signs (24h Range):  Temp:  [97.5 °F (36.4 °C)-98.6 °F (37 °C)] 97.8 °F (36.6 °C)  Pulse:  [76-89] 82  Resp:  [17-20] 18  SpO2:  [97 %-99 %] 99 %  BP: (125-170)/(67-80) 152/70     Weight: 129.5 kg (285 lb 7.9 oz)  Body mass index is 35.68 kg/m².  No intake or output data in the 24 hours ending 11/25/22 1339   Physical Exam  Vitals and nursing note reviewed.   Constitutional:       General: He is not in acute distress.     Appearance: Normal appearance.   Cardiovascular:      Rate and Rhythm: Normal rate and regular rhythm.   Pulmonary:      Comments: Breathing comfortably on RA, no wheezes   Abdominal:      General: Bowel sounds are normal. There is no distension.      Palpations: Abdomen is soft.      Tenderness: There is no abdominal tenderness. There is no guarding or rebound.   Musculoskeletal:      Comments: B/l feet with ACE dressings, R wound vac in place with serousang. Output   Neurological:      General: No focal deficit present.      Mental Status: He is alert and oriented to person, place, and time. Mental status is at baseline.       Significant Labs: All pertinent labs within the past 24 hours have been reviewed.    Significant Imaging: I have reviewed all pertinent imaging results/findings within the past 24 hours.

## 2022-11-25 NOTE — CONSULTS
"Pharmacokinetic Assessment Follow Up: IV Vancomycin    Vancomycin serum concentration assessment(s):    The random level was drawn correctly and can be used to guide therapy at this time. The measurement is within the desired definitive target range of 15 to 20 mcg/mL.    Vancomycin Regimen Plan:    Re-dose when the random level is less than 20 mcg/mL, next level to be drawn at 1800 on 11/25    Drug levels (last 3 results):  Recent Labs   Lab Result Units 11/23/22  1501 11/24/22  1659   Vancomycin, Random ug/mL  --  18.0   Vancomycin-Trough ug/mL 16.9  --        Pharmacy will continue to follow and monitor vancomycin.    Please contact pharmacy at extension [phone] for questions regarding this assessment.    Thank you for the consult,   Estella Portillo, PharmD 11/24/2022 7:32 PM         Patient brief summary:  Joseph Paris Jr. is a 52 y.o. male initiated on antimicrobial therapy with IV Vancomycin for treatment of bacteremia    The patient's currently being pulse dosed and received a 2000 mg dose this evening.    Drug Allergies:   Review of patient's allergies indicates:   Allergen Reactions    Lisinopril Swelling    Penicillins Other (See Comments)     Pt unsure of reaction, stating "I just know I'm allergic"       Actual Body Weight:   127.6 kg    Renal Function:   Estimated Creatinine Clearance: 75.9 mL/min (A) (based on SCr of 1.6 mg/dL (H)).,     Dialysis Method (if applicable):  N/A    CBC (last 72 hours):  Recent Labs   Lab Result Units 11/22/22  0542 11/24/22  0552   WBC K/uL 7.82 7.86   Hemoglobin g/dL 7.0* 7.1*   Hematocrit % 22.1* 23.2*   Platelets K/uL 425 449   Gran % % 59.4 61.1   Lymph % % 27.4 27.2   Mono % % 8.7 7.3   Eosinophil % % 2.6 3.2   Basophil % % 0.5 0.4   Differential Method  Automated Automated       Metabolic Panel (last 72 hours):  Recent Labs   Lab Result Units 11/22/22  0542 11/23/22  0626 11/24/22  0552 11/24/22  0929   Sodium mmol/L 133*  --  135*  --    Sodium, Urine mmol/L  --   --  "  --  56   Potassium mmol/L 4.0  --  3.9  --    Chloride mmol/L 101  --  102  --    CO2 mmol/L 24  --  26  --    Glucose mg/dL 247*  --  217*  --    BUN mg/dL 10  --  13  --    Creatinine mg/dL 1.5*  1.5* 1.7* 1.6*  1.6*  --    Creatinine, Urine mg/dL  --   --   --  68.6   Magnesium mg/dL  --   --  1.8  --        Vancomycin Administrations:  vancomycin given in the last 96 hours                     vancomycin 2 g in dextrose 5 % 500 mL IVPB (mg) 2,000 mg New Bag 11/23/22 1657    vancomycin 2 g in dextrose 5 % 500 mL IVPB (mg) 2,000 mg New Bag 11/22/22 1545     2,000 mg New Bag 11/21/22 1611                    Microbiologic Results:  Microbiology Results (last 7 days)       Procedure Component Value Units Date/Time    Clostridium difficile EIA [[id number]]     Order Status: Canceled Specimen: Stool     Blood culture [[id number]] Collected: 11/14/22 0536    Order Status: Completed Specimen: Blood Updated: 11/19/22 2012     Blood Culture, Routine No growth after 5 days.    Blood culture [[id number]] Collected: 11/14/22 0536    Order Status: Completed Specimen: Blood Updated: 11/19/22 2012     Blood Culture, Routine No growth after 5 days.    Culture, Anaerobe [[id number]] Collected: 11/13/22 1148    Order Status: Completed Specimen: Abscess from Foot, Left Updated: 11/18/22 0917     Anaerobic Culture No anaerobes isolated    Narrative:      Left Foot Abscess

## 2022-11-26 LAB
ANION GAP SERPL CALC-SCNC: 12 MMOL/L (ref 8–16)
ANION GAP SERPL CALC-SCNC: 3 MMOL/L (ref 8–16)
BASOPHILS # BLD AUTO: 0.07 K/UL (ref 0–0.2)
BASOPHILS NFR BLD: 1.1 % (ref 0–1.9)
BUN SERPL-MCNC: 15 MG/DL (ref 6–20)
BUN SERPL-MCNC: 17 MG/DL (ref 6–20)
CALCIUM SERPL-MCNC: 7.7 MG/DL (ref 8.7–10.5)
CALCIUM SERPL-MCNC: 8.4 MG/DL (ref 8.7–10.5)
CHLORIDE SERPL-SCNC: 100 MMOL/L (ref 95–110)
CHLORIDE SERPL-SCNC: 97 MMOL/L (ref 95–110)
CO2 SERPL-SCNC: 23 MMOL/L (ref 23–29)
CO2 SERPL-SCNC: 27 MMOL/L (ref 23–29)
CREAT SERPL-MCNC: 1.5 MG/DL (ref 0.5–1.4)
CREAT SERPL-MCNC: 1.7 MG/DL (ref 0.5–1.4)
CREAT SERPL-MCNC: 1.7 MG/DL (ref 0.5–1.4)
DIFFERENTIAL METHOD: ABNORMAL
EOSINOPHIL # BLD AUTO: 0.2 K/UL (ref 0–0.5)
EOSINOPHIL NFR BLD: 3.7 % (ref 0–8)
ERYTHROCYTE [DISTWIDTH] IN BLOOD BY AUTOMATED COUNT: 14.8 % (ref 11.5–14.5)
EST. GFR  (NO RACE VARIABLE): 47 ML/MIN/1.73 M^2
EST. GFR  (NO RACE VARIABLE): 47 ML/MIN/1.73 M^2
EST. GFR  (NO RACE VARIABLE): 55 ML/MIN/1.73 M^2
GLUCOSE SERPL-MCNC: 109 MG/DL (ref 70–110)
GLUCOSE SERPL-MCNC: 212 MG/DL (ref 70–110)
HCT VFR BLD AUTO: 23.6 % (ref 40–54)
HGB BLD-MCNC: 7.4 G/DL (ref 14–18)
IMM GRANULOCYTES # BLD AUTO: 0.03 K/UL (ref 0–0.04)
IMM GRANULOCYTES NFR BLD AUTO: 0.5 % (ref 0–0.5)
LYMPHOCYTES # BLD AUTO: 1.8 K/UL (ref 1–4.8)
LYMPHOCYTES NFR BLD: 29.2 % (ref 18–48)
MCH RBC QN AUTO: 31.8 PG (ref 27–31)
MCHC RBC AUTO-ENTMCNC: 31.4 G/DL (ref 32–36)
MCV RBC AUTO: 101 FL (ref 82–98)
MONOCYTES # BLD AUTO: 0.6 K/UL (ref 0.3–1)
MONOCYTES NFR BLD: 9.5 % (ref 4–15)
NEUTROPHILS # BLD AUTO: 3.5 K/UL (ref 1.8–7.7)
NEUTROPHILS NFR BLD: 56 % (ref 38–73)
NRBC BLD-RTO: 0 /100 WBC
PLATELET # BLD AUTO: 402 K/UL (ref 150–450)
PMV BLD AUTO: 9.3 FL (ref 9.2–12.9)
POCT GLUCOSE: 114 MG/DL (ref 70–110)
POCT GLUCOSE: 130 MG/DL (ref 70–110)
POCT GLUCOSE: 184 MG/DL (ref 70–110)
POCT GLUCOSE: 223 MG/DL (ref 70–110)
POCT GLUCOSE: 230 MG/DL (ref 70–110)
POCT GLUCOSE: 73 MG/DL (ref 70–110)
POCT GLUCOSE: 75 MG/DL (ref 70–110)
POTASSIUM SERPL-SCNC: 3.4 MMOL/L (ref 3.5–5.1)
POTASSIUM SERPL-SCNC: 4.1 MMOL/L (ref 3.5–5.1)
RBC # BLD AUTO: 2.33 M/UL (ref 4.6–6.2)
SODIUM SERPL-SCNC: 127 MMOL/L (ref 136–145)
SODIUM SERPL-SCNC: 135 MMOL/L (ref 136–145)
VANCOMYCIN SERPL-MCNC: 19.5 UG/ML
WBC # BLD AUTO: 6.23 K/UL (ref 3.9–12.7)

## 2022-11-26 PROCEDURE — 80202 ASSAY OF VANCOMYCIN: CPT | Performed by: INTERNAL MEDICINE

## 2022-11-26 PROCEDURE — 25000003 PHARM REV CODE 250: Performed by: PODIATRIST

## 2022-11-26 PROCEDURE — 80048 BASIC METABOLIC PNL TOTAL CA: CPT | Mod: 91 | Performed by: INTERNAL MEDICINE

## 2022-11-26 PROCEDURE — 27000207 HC ISOLATION

## 2022-11-26 PROCEDURE — 63600175 PHARM REV CODE 636 W HCPCS: Performed by: INTERNAL MEDICINE

## 2022-11-26 PROCEDURE — 63600175 PHARM REV CODE 636 W HCPCS: Performed by: PODIATRIST

## 2022-11-26 PROCEDURE — 25000003 PHARM REV CODE 250: Performed by: INTERNAL MEDICINE

## 2022-11-26 PROCEDURE — 21400001 HC TELEMETRY ROOM

## 2022-11-26 PROCEDURE — 25000003 PHARM REV CODE 250: Performed by: NURSE PRACTITIONER

## 2022-11-26 PROCEDURE — 97116 GAIT TRAINING THERAPY: CPT

## 2022-11-26 PROCEDURE — 85025 COMPLETE CBC W/AUTO DIFF WBC: CPT | Performed by: INTERNAL MEDICINE

## 2022-11-26 PROCEDURE — 25000003 PHARM REV CODE 250: Performed by: STUDENT IN AN ORGANIZED HEALTH CARE EDUCATION/TRAINING PROGRAM

## 2022-11-26 RX ORDER — SODIUM CHLORIDE 9 MG/ML
INJECTION, SOLUTION INTRAVENOUS CONTINUOUS
Status: DISCONTINUED | OUTPATIENT
Start: 2022-11-26 | End: 2022-11-27

## 2022-11-26 RX ADMIN — PRAVASTATIN SODIUM 20 MG: 20 TABLET ORAL at 09:11

## 2022-11-26 RX ADMIN — ACETAMINOPHEN 650 MG: 325 TABLET ORAL at 07:11

## 2022-11-26 RX ADMIN — INSULIN ASPART 5 UNITS: 100 INJECTION, SOLUTION INTRAVENOUS; SUBCUTANEOUS at 05:11

## 2022-11-26 RX ADMIN — AMITRIPTYLINE HYDROCHLORIDE 50 MG: 50 TABLET, FILM COATED ORAL at 09:11

## 2022-11-26 RX ADMIN — Medication 16 G: at 09:11

## 2022-11-26 RX ADMIN — INSULIN ASPART 4 UNITS: 100 INJECTION, SOLUTION INTRAVENOUS; SUBCUTANEOUS at 11:11

## 2022-11-26 RX ADMIN — PANTOPRAZOLE SODIUM 40 MG: 40 TABLET, DELAYED RELEASE ORAL at 09:11

## 2022-11-26 RX ADMIN — INSULIN ASPART 5 UNITS: 100 INJECTION, SOLUTION INTRAVENOUS; SUBCUTANEOUS at 11:11

## 2022-11-26 RX ADMIN — CEFTRIAXONE 2 G: 2 INJECTION, POWDER, FOR SOLUTION INTRAMUSCULAR; INTRAVENOUS at 12:11

## 2022-11-26 RX ADMIN — INSULIN DETEMIR 20 UNITS: 100 INJECTION, SOLUTION SUBCUTANEOUS at 10:11

## 2022-11-26 RX ADMIN — SODIUM CHLORIDE: 0.9 INJECTION, SOLUTION INTRAVENOUS at 09:11

## 2022-11-26 RX ADMIN — GABAPENTIN 400 MG: 400 CAPSULE ORAL at 09:11

## 2022-11-26 RX ADMIN — ENOXAPARIN SODIUM 40 MG: 40 INJECTION SUBCUTANEOUS at 05:11

## 2022-11-26 RX ADMIN — INSULIN ASPART 2 UNITS: 100 INJECTION, SOLUTION INTRAVENOUS; SUBCUTANEOUS at 05:11

## 2022-11-26 RX ADMIN — Medication 1 TABLET: at 05:11

## 2022-11-26 RX ADMIN — INSULIN ASPART 4 UNITS: 100 INJECTION, SOLUTION INTRAVENOUS; SUBCUTANEOUS at 06:11

## 2022-11-26 RX ADMIN — Medication 1 TABLET: at 07:11

## 2022-11-26 RX ADMIN — AMLODIPINE BESYLATE 5 MG: 5 TABLET ORAL at 09:11

## 2022-11-26 RX ADMIN — INSULIN DETEMIR 15 UNITS: 100 INJECTION, SOLUTION SUBCUTANEOUS at 09:11

## 2022-11-26 RX ADMIN — GABAPENTIN 800 MG: 400 CAPSULE ORAL at 09:11

## 2022-11-26 RX ADMIN — CEFTRIAXONE 2 G: 2 INJECTION, POWDER, FOR SOLUTION INTRAMUSCULAR; INTRAVENOUS at 11:11

## 2022-11-26 RX ADMIN — Medication 1 TABLET: at 11:11

## 2022-11-26 NOTE — PLAN OF CARE
IV ABT therapy remains in progress. No adverse reactions noted, remains afebrile. DAYNAE PICC. Dressing to right foot C/D/I, wound care performed as ordered. Wound vac to left foot to continuous suction. NWB to LLE. Accu checks AC&HS, insulin adm as ordered per sliding scale. Contact precautions remains in place. Call light in reach.

## 2022-11-26 NOTE — ASSESSMENT & PLAN NOTE
--continue basal insulin at 20 units QHS,continue AM insulin 15 u   --continue SSI, prandial insulin therapy-aspart 5 TIDWM  --Accuchecks ACQHS  - BG goal 140-180 for optimal wound healing

## 2022-11-26 NOTE — ASSESSMENT & PLAN NOTE
Likely antibiotic associated, pt afebrile and without leukocytosis   - Cdif cancelled as diarrhea resolved   - continue probiotic

## 2022-11-26 NOTE — PT/OT/SLP PROGRESS
Physical Therapy  Treatment    Joseph Paris Jr.   MRN: [id number]   Admitting Diagnosis: Bacteremia due to group B Streptococcus    PT Received On: 11/17/22  PT Start Time: 1626     PT Stop Time: 1640    PT Total Time (min): 14 min       Billable Minutes:  Gait Training 14    Treatment Type: Treatment  PT/PTA: PT     PTA Visit Number: 0       General Precautions: Standard, fall  Orthopedic Precautions: LLE weight bearing as tolerated   Braces: N/A  Respiratory Status: Room air    Spiritual, Cultural Beliefs, Congregation Practices, Values that Affect Care: no    Subjective:  Communicated with nursing prior to session.  Pt agreeable to PT services    Pain/Comfort  Pain Rating 1: 0/10    Objective:   Patient found with: peripheral IV, telemetry    Functional Mobility:     Performed bed mobility, transfers, and gait activity independently. Able to manage lines with no difficulty.     Treatment and Education:  Educated pt on benefits of consistent participation in PT services to meet functional goals. Educated to perform exercises intermittently throughout day to tolerance. Educated pt on importance of sitting OOB to promote endurance and overall activity tolerance. Pt expressed understanding. Educated pt on call don't fall policy and use of call button to alert nursing staff of needs. No further PT needs at this time.    AM-PAC 6 CLICK MOBILITY  How much help from another person does this patient currently need?   1 = Unable, Total/Dependent Assistance  2 = A lot, Maximum/Moderate Assistance  3 = A little, Minimum/Contact Guard/Supervision  4 = None, Modified [place]/Independent    Turning over in bed (including adjusting bedclothes, sheets and blankets)?: 4  Sitting down on and standing up from a chair with arms (e.g., wheelchair, bedside commode, etc.): 4  Moving from lying on back to sitting on the side of the bed?: 4  Moving to and from a bed to a chair (including a wheelchair)?: 4  Need to walk in hospital room?:  4  Climbing 3-5 steps with a railing?: 1  Basic Mobility Total Score: 21    AM-PAC Raw Score CMS G-Code Modifier Level of Impairment Assistance   6 % Total / Unable   7 - 9 CM 80 - 100% Maximal Assist   10 - 14 CL 60 - 80% Moderate Assist   15 - 19 CK 40 - 60% Moderate Assist   20 - 22 CJ 20 - 40% Minimal Assist   23 CI 1-20% SBA / CGA   24 CH 0% Independent/ Mod I     Patient left supine with all lines intact, call button in reach, and nursing notified.    Assessment:  Joseph Paris Jr. is a 52 y.o. male with a medical diagnosis of Bacteremia due to group B Streptococcus and presents with MRSA and s/p I&D to L foot with wound vac placement. Pt has participated well in PT services and is mobilizing without difficulty. No further PT needs at this time.    Rehab identified problem list/impairments: Rehab identified problem list/impairments: weakness, impaired endurance, impaired functional mobility, gait instability, decreased safety awareness, decreased lower extremity function, impaired sensation      Discharge recommendations: Discharge Facility/Level of Care Needs: home health PT     Barriers to discharge:      Equipment recommendations: Equipment Needed After Discharge: walker, rolling     GOALS:   Multidisciplinary Problems       Physical Therapy Goals          Problem: Physical Therapy    Goal Priority Disciplines Outcome Goal Variances Interventions   Physical Therapy Goal     PT, PT/OT Ongoing, Progressing     Description: Pt will perform bed mobility independently in order to participate in EOB activity.  Pt will perform transfers independently in order to participate in OOB activity.   Pt will ambulate 100ft mod I with LRAD in order to participate in daily tasks updated to WBAT.                        PLAN:    Patient to be seen 3 x/week  to address the above listed problems via gait training, therapeutic activities, therapeutic exercises  Plan of Care expires: 12/01/22  Plan of Care reviewed with:  patient         11/26/2022

## 2022-11-26 NOTE — PLAN OF CARE
EVAL AND TX COMPLETED: facilitated bed mobility independently, transfers independently. Ambulated independently in room managing both IV and wound vac. No further PT services needed.

## 2022-11-26 NOTE — PLAN OF CARE
Plan of care reviewed with patient with verbal understanding. Chart and orders reviewed.  Pt remains free from falls this shift, Safety precautions in place.   Pt currently resting comfortably in bed. Pain controlled with po med (see emar for pain admin).  Hourly rounding with bed in lowest position, side rails up, call light in reach.  Will continue to monitor until end of shift.       Problem: Adult Inpatient Plan of Care  Goal: Plan of Care Review  Outcome: Ongoing, Progressing  Goal: Readiness for Transition of Care  Outcome: Ongoing, Progressing     Problem: Diabetes Comorbidity  Goal: Blood Glucose Level Within Targeted Range  Outcome: Ongoing, Progressing

## 2022-11-26 NOTE — ASSESSMENT & PLAN NOTE
- macrocytic, iron studies fairly unremarkable  - B12/folate within normal limits    -transfused 1u PRBC 11/25 with appropriate rise   - monitor CBC

## 2022-11-26 NOTE — SUBJECTIVE & OBJECTIVE
Interval History: Hgb responded appropriately to transfusion. Pt has no complaints today. Reports good PO intake, good UOP. No further diarrhea noted over last 1-2 days. + flatus     Review of Systems  Objective:     Vital Signs (Most Recent):  Temp: 98 °F (36.7 °C) (11/26/22 1122)  Pulse: 91 (11/26/22 1122)  Resp: 16 (11/26/22 1122)  BP: (!) 153/79 (11/26/22 1122)  SpO2: 97 % (11/26/22 1122)   Vital Signs (24h Range):  Temp:  [97.8 °F (36.6 °C)-98.6 °F (37 °C)] 98 °F (36.7 °C)  Pulse:  [74-91] 91  Resp:  [16-18] 16  SpO2:  [95 %-99 %] 97 %  BP: (135-175)/(70-95) 153/79     Weight: 133.6 kg (294 lb 8.6 oz)  Body mass index is 36.81 kg/m².    Intake/Output Summary (Last 24 hours) at 11/26/2022 1239  Last data filed at 11/25/2022 1622  Gross per 24 hour   Intake 308.33 ml   Output --   Net 308.33 ml      Physical Exam  Vitals and nursing note reviewed.   Constitutional:       General: He is not in acute distress.     Appearance: Normal appearance.   Cardiovascular:      Rate and Rhythm: Normal rate and regular rhythm.      Heart sounds: No murmur heard.    No friction rub. No gallop.   Pulmonary:      Effort: Pulmonary effort is normal.      Breath sounds: Normal breath sounds. No wheezing, rhonchi or rales.   Abdominal:      General: Bowel sounds are normal. There is no distension.      Palpations: Abdomen is soft.      Tenderness: There is no abdominal tenderness. There is no guarding or rebound.   Musculoskeletal:      Comments: B/l feet with Ace dressing CDI, L foot wound vac in place, trace edema    Neurological:      Mental Status: He is alert and oriented to person, place, and time. Mental status is at baseline.       Significant Labs: All pertinent labs within the past 24 hours have been reviewed.    Significant Imaging: I have reviewed all pertinent imaging results/findings within the past 24 hours.

## 2022-11-26 NOTE — PROGRESS NOTES
[facility] Medicine  Progress Note    Patient Name: Joseph Paris Jr.  MRN: [id number]  Patient Class: IP- Inpatient   Admission Date: 11/12/2022  Length of Stay: 13 days  Attending Physician: Sarika Madrid MD  Primary Care Provider: Thong Hidalgo Jr, MD        Subjective:     Principal Problem:Bacteremia due to group B Streptococcus        HPI:  52 y.o. male patient with a PMHx of HTN, DM, and CAD presents to the Emergency Department for evaluation of Bilateral Feet Swelling which onset gradually within the past week. The pt last saw their wound care three weeks ago. The pt lost his health insurance coverage and decided to take care of the wounds himself. The pt self drained pus from his wounds and used a heated water massager on his feet; reports feet look more infected than before. Symptoms are constant and moderate in severity. No mitigating or exacerbating factors reported. Associated sxs include fever and SOB. Patient denies any CP, N/V, weakness, dysuria, and all other sxs at this time      Overview/Hospital Course:  S/p I&D of foot per Podiatry, recommended MRI and angio with possible referral to vascular surgery. Concerns for viability of flap due to appearance of wounds. Will likely need to return to the OR later this week. Wound cultures collected during procedure, Blood culture: gram positive cocci in chains in both bottles, adjusted antibiotic regimen, added vancomycin. On 11/15- Blood cultures ultimately grew MDR group B strep, but sensitive to both Rocephin and Vancomycin. Staphylococcus speciated as well in blood, but sensitivities still pending. Angiogram ordered by Vascular surgery tentatively scheduled to be done on 11/15. Cannulization in R femoral artery successful. Patient reports notable improvement in his limb swelling and pain immediately after procedure. Further debridement tentatively scheduled for 11/17. Tolerated procedure well. Wound vac placed at that time as  well. PICC line inserted on 11/18/22. Definitive antibiotic recs placed by ID (see their note for specifics). Total duration of therapy 6 weeks.  Wound care following for wound vac management. Awaiting insurance approval for home abx, wound vac .      Interval History: Hgb responded appropriately to transfusion. Pt has no complaints today. Reports good PO intake, good UOP. No further diarrhea noted over last 1-2 days. + flatus     Review of Systems  Objective:     Vital Signs (Most Recent):  Temp: 98 °F (36.7 °C) (11/26/22 1122)  Pulse: 91 (11/26/22 1122)  Resp: 16 (11/26/22 1122)  BP: (!) 153/79 (11/26/22 1122)  SpO2: 97 % (11/26/22 1122)   Vital Signs (24h Range):  Temp:  [97.8 °F (36.6 °C)-98.6 °F (37 °C)] 98 °F (36.7 °C)  Pulse:  [74-91] 91  Resp:  [16-18] 16  SpO2:  [95 %-99 %] 97 %  BP: (135-175)/(70-95) 153/79     Weight: 133.6 kg (294 lb 8.6 oz)  Body mass index is 36.81 kg/m².    Intake/Output Summary (Last 24 hours) at 11/26/2022 1239  Last data filed at 11/25/2022 1622  Gross per 24 hour   Intake 308.33 ml   Output --   Net 308.33 ml      Physical Exam  Vitals and nursing note reviewed.   Constitutional:       General: He is not in acute distress.     Appearance: Normal appearance.   Cardiovascular:      Rate and Rhythm: Normal rate and regular rhythm.      Heart sounds: No murmur heard.    No friction rub. No gallop.   Pulmonary:      Effort: Pulmonary effort is normal.      Breath sounds: Normal breath sounds. No wheezing, rhonchi or rales.   Abdominal:      General: Bowel sounds are normal. There is no distension.      Palpations: Abdomen is soft.      Tenderness: There is no abdominal tenderness. There is no guarding or rebound.   Musculoskeletal:      Comments: B/l feet with Ace dressing CDI, L foot wound vac in place, trace edema    Neurological:      Mental Status: He is alert and oriented to person, place, and time. Mental status is at baseline.       Significant Labs: All pertinent labs within  the past 24 hours have been reviewed.    Significant Imaging: I have reviewed all pertinent imaging results/findings within the past 24 hours.      Assessment/Plan:      * Bacteremia due to group B Streptococcus  - Continue Rocephin per ID   --ID following, recommending IV Daptomycin + Rocephin x 6 weeks  --ambulatory infusion orders placed for abx and wound vac, pending insurance/approval     Acute osteomyelitis of metatarsal bone of left foot  -s/p I&D on 11/13  -BC (11/13) grew group B strep- repeat cultures (11/14) no growth to date   -MRI of L FOOT: Large ulceration extending along 1st metatarsal shaft to 1st proximal phalanx. Abnormal bone marrow signal enhancement compatible with osteomyelitis.   -  vascular surgery consulted -Angiogram of bilateral LE's on 11/15 with Dr Berman. -successful recannulization of R femoral   -s/p  debridement and wound vac placement on 11/16 with Podiatry Dr. Augustine   - ID consulted; rec IV Dapto and Rocephin x 6 weeks EOT 12/29/22  -pending set up of wound vac and home IV abx due to issues with insurance       Bacteremia due to methicillin resistant Staphylococcus aureus  - Continue IV vanc per ID; dosing and monitoring of Vanc per pharmacy       TIERA (acute kidney injury)  - Cr initially improved with fluids, now uptrending again. ?IVVD due to diarrhea vs. ATN in setting of Vanc use   - urine lytes c/w prerenal etiology  -start NS 75cc/hr   - monitor BMP; renally dose meds     Diarrhea  Likely antibiotic associated, pt afebrile and without leukocytosis   - Cdif cancelled as diarrhea resolved   - continue probiotic     Acute on chronic anemia  - macrocytic, iron studies fairly unremarkable  - B12/folate within normal limits    -transfused 1u PRBC 11/25 with appropriate rise   - monitor CBC                               Decubitus ulcer of right heel  --wound care consulted and following  - continue local wound care       Diabetes mellitus  --continue basal insulin at 20 units  QHS,continue AM insulin 15 u   --continue SSI, prandial insulin therapy-aspart 5 TIDWM  --Accuchecks ACQHS  - BG goal 140-180 for optimal wound healing         VTE Risk Mitigation (From admission, onward)         Ordered     enoxaparin injection 40 mg  Daily         11/12/22 2232     IP VTE HIGH RISK PATIENT  Once         11/12/22 2232     Place sequential compression device  Until discontinued         11/12/22 2232                Discharge Planning   KIT:      Code Status: Full Code   Is the patient medically ready for discharge?:     Reason for patient still in hospital (select all that apply): Pending disposition  Discharge Plan A: Home   Discharge Delays: None known at this time    Sarika Madrid MD  Department of Hospital Medicine   [facility] - Med Surg

## 2022-11-26 NOTE — PROGRESS NOTES
"Pharmacokinetic Assessment Follow Up: IV Vancomycin    Vancomycin serum concentration assessment(s):    The random level was drawn correctly and can be used to guide therapy at this time. The measurement is within the desired definitive target range of 15 to 20 mcg/mL.    Vancomycin Regimen Plan:    Continue pulse dosing regimen with a one time dose of vancomycin 2000 mg.    Re-dose when the random level is less than 20 mcg/mL, next level to be drawn at 2100 on 11/26    Drug levels (last 3 results):  Recent Labs   Lab Result Units 11/23/22  1501 11/24/22  1659 11/25/22 2014   Vancomycin, Random ug/mL  --  18.0 18.1   Vancomycin-Trough ug/mL 16.9  --   --        Pharmacy will continue to follow and monitor vancomycin.    Please contact pharmacy at extension [phone] for questions regarding this assessment.    Thank you for the consult,   Rita Acosta       Patient brief summary:  Joseph Paris Jr. is a 52 y.o. male initiated on antimicrobial therapy with IV Vancomycin for treatment of bacteremia    The patient's current regimen is pulse dosing.     Drug Allergies:   Review of patient's allergies indicates:   Allergen Reactions    Lisinopril Swelling    Penicillins Other (See Comments)     Pt unsure of reaction, stating "I just know I'm allergic"       Actual Body Weight:   129.5 kg    Renal Function:   Estimated Creatinine Clearance: 76.5 mL/min (A) (based on SCr of 1.6 mg/dL (H)).,     Dialysis Method (if applicable):  N/A    CBC (last 72 hours):  Recent Labs   Lab Result Units 11/24/22  0552 11/25/22  0640   WBC K/uL 7.86 6.25   Hemoglobin g/dL 7.1* 6.5*   Hematocrit % 23.2* 21.0*   Platelets K/uL 449 396   Gran % % 61.1 53.5   Lymph % % 27.2 33.3   Mono % % 7.3 8.5   Eosinophil % % 3.2 3.5   Basophil % % 0.4 0.6   Differential Method  Automated Automated       Metabolic Panel (last 72 hours):  Recent Labs   Lab Result Units 11/23/22  0626 11/24/22  0552 11/24/22  0929 11/25/22  0640   Sodium mmol/L  --  135*  --  " 134*   Sodium, Urine mmol/L  --   --  56  --    Potassium mmol/L  --  3.9  --  3.9   Chloride mmol/L  --  102  --  100   CO2 mmol/L  --  26  --  26   Glucose mg/dL  --  217*  --  159*   BUN mg/dL  --  13  --  14   Creatinine mg/dL 1.7* 1.6*  1.6*  --  1.6*  1.6*   Creatinine, Urine mg/dL  --   --  68.6  --    Magnesium mg/dL  --  1.8  --   --        Vancomycin Administrations:  vancomycin given in the last 96 hours                     vancomycin 2 g in dextrose 5 % 500 mL IVPB (mg) 2,000 mg New Bag 11/24/22 2057    vancomycin 2 g in dextrose 5 % 500 mL IVPB (mg) 2,000 mg New Bag 11/23/22 1657    vancomycin 2 g in dextrose 5 % 500 mL IVPB (mg) 2,000 mg New Bag 11/22/22 1545                    Microbiologic Results:  Microbiology Results (last 7 days)       Procedure Component Value Units Date/Time    Clostridium difficile EIA [[id number]]     Order Status: Canceled Specimen: Stool     Blood culture [[id number]] Collected: 11/14/22 0536    Order Status: Completed Specimen: Blood Updated: 11/19/22 2012     Blood Culture, Routine No growth after 5 days.    Blood culture [[id number]] Collected: 11/14/22 0536    Order Status: Completed Specimen: Blood Updated: 11/19/22 2012     Blood Culture, Routine No growth after 5 days.

## 2022-11-26 NOTE — ASSESSMENT & PLAN NOTE
- Cr initially improved with fluids, now uptrending again. ?IVVD due to diarrhea vs. ATN in setting of Vanc use   - urine lytes c/w prerenal etiology  -start NS 75cc/hr   - monitor BMP; renally dose meds

## 2022-11-27 PROBLEM — E87.1 HYPONATREMIA: Status: ACTIVE | Noted: 2022-11-27

## 2022-11-27 LAB
ANION GAP SERPL CALC-SCNC: 3 MMOL/L (ref 8–16)
BASOPHILS # BLD AUTO: 0.05 K/UL (ref 0–0.2)
BASOPHILS NFR BLD: 0.8 % (ref 0–1.9)
BUN SERPL-MCNC: 17 MG/DL (ref 6–20)
CALCIUM SERPL-MCNC: 7.8 MG/DL (ref 8.7–10.5)
CHLORIDE SERPL-SCNC: 99 MMOL/L (ref 95–110)
CO2 SERPL-SCNC: 27 MMOL/L (ref 23–29)
CREAT SERPL-MCNC: 1.7 MG/DL (ref 0.5–1.4)
CREAT SERPL-MCNC: 1.7 MG/DL (ref 0.5–1.4)
DIFFERENTIAL METHOD: ABNORMAL
EOSINOPHIL # BLD AUTO: 0.2 K/UL (ref 0–0.5)
EOSINOPHIL NFR BLD: 3.2 % (ref 0–8)
ERYTHROCYTE [DISTWIDTH] IN BLOOD BY AUTOMATED COUNT: 14.7 % (ref 11.5–14.5)
EST. GFR  (NO RACE VARIABLE): 47 ML/MIN/1.73 M^2
EST. GFR  (NO RACE VARIABLE): 47 ML/MIN/1.73 M^2
GLUCOSE SERPL-MCNC: 188 MG/DL (ref 70–110)
HCT VFR BLD AUTO: 24 % (ref 40–54)
HGB BLD-MCNC: 7.4 G/DL (ref 14–18)
IMM GRANULOCYTES # BLD AUTO: 0.02 K/UL (ref 0–0.04)
IMM GRANULOCYTES NFR BLD AUTO: 0.3 % (ref 0–0.5)
LYMPHOCYTES # BLD AUTO: 1.9 K/UL (ref 1–4.8)
LYMPHOCYTES NFR BLD: 30.2 % (ref 18–48)
MCH RBC QN AUTO: 31.1 PG (ref 27–31)
MCHC RBC AUTO-ENTMCNC: 30.8 G/DL (ref 32–36)
MCV RBC AUTO: 101 FL (ref 82–98)
MONOCYTES # BLD AUTO: 0.5 K/UL (ref 0.3–1)
MONOCYTES NFR BLD: 8.8 % (ref 4–15)
NEUTROPHILS # BLD AUTO: 3.5 K/UL (ref 1.8–7.7)
NEUTROPHILS NFR BLD: 56.7 % (ref 38–73)
NRBC BLD-RTO: 0 /100 WBC
OSMOLALITY UR: 285 MOSM/KG (ref 50–1200)
PLATELET # BLD AUTO: 365 K/UL (ref 150–450)
PMV BLD AUTO: 9.1 FL (ref 9.2–12.9)
POCT GLUCOSE: 147 MG/DL (ref 70–110)
POCT GLUCOSE: 159 MG/DL (ref 70–110)
POCT GLUCOSE: 161 MG/DL (ref 70–110)
POCT GLUCOSE: 221 MG/DL (ref 70–110)
POTASSIUM SERPL-SCNC: 3.9 MMOL/L (ref 3.5–5.1)
RBC # BLD AUTO: 2.38 M/UL (ref 4.6–6.2)
SODIUM SERPL-SCNC: 129 MMOL/L (ref 136–145)
SODIUM UR-SCNC: 93 MMOL/L (ref 20–250)
WBC # BLD AUTO: 6.16 K/UL (ref 3.9–12.7)

## 2022-11-27 PROCEDURE — 25000003 PHARM REV CODE 250: Performed by: INTERNAL MEDICINE

## 2022-11-27 PROCEDURE — 80048 BASIC METABOLIC PNL TOTAL CA: CPT | Performed by: INTERNAL MEDICINE

## 2022-11-27 PROCEDURE — 85025 COMPLETE CBC W/AUTO DIFF WBC: CPT | Performed by: INTERNAL MEDICINE

## 2022-11-27 PROCEDURE — 25000003 PHARM REV CODE 250: Performed by: NURSE PRACTITIONER

## 2022-11-27 PROCEDURE — 27000207 HC ISOLATION

## 2022-11-27 PROCEDURE — 83935 ASSAY OF URINE OSMOLALITY: CPT | Performed by: INTERNAL MEDICINE

## 2022-11-27 PROCEDURE — 25000003 PHARM REV CODE 250: Performed by: PODIATRIST

## 2022-11-27 PROCEDURE — 63600175 PHARM REV CODE 636 W HCPCS: Performed by: PODIATRIST

## 2022-11-27 PROCEDURE — 84300 ASSAY OF URINE SODIUM: CPT | Performed by: INTERNAL MEDICINE

## 2022-11-27 PROCEDURE — 21400001 HC TELEMETRY ROOM

## 2022-11-27 PROCEDURE — 63600175 PHARM REV CODE 636 W HCPCS: Performed by: INTERNAL MEDICINE

## 2022-11-27 PROCEDURE — 25000003 PHARM REV CODE 250: Performed by: STUDENT IN AN ORGANIZED HEALTH CARE EDUCATION/TRAINING PROGRAM

## 2022-11-27 RX ADMIN — INSULIN ASPART 2 UNITS: 100 INJECTION, SOLUTION INTRAVENOUS; SUBCUTANEOUS at 11:11

## 2022-11-27 RX ADMIN — Medication 1 TABLET: at 11:11

## 2022-11-27 RX ADMIN — INSULIN DETEMIR 15 UNITS: 100 INJECTION, SOLUTION SUBCUTANEOUS at 09:11

## 2022-11-27 RX ADMIN — GABAPENTIN 400 MG: 400 CAPSULE ORAL at 08:11

## 2022-11-27 RX ADMIN — AMITRIPTYLINE HYDROCHLORIDE 50 MG: 50 TABLET, FILM COATED ORAL at 08:11

## 2022-11-27 RX ADMIN — VANCOMYCIN HYDROCHLORIDE 1750 MG: 10 INJECTION, POWDER, LYOPHILIZED, FOR SOLUTION INTRAVENOUS at 11:11

## 2022-11-27 RX ADMIN — INSULIN ASPART 5 UNITS: 100 INJECTION, SOLUTION INTRAVENOUS; SUBCUTANEOUS at 11:11

## 2022-11-27 RX ADMIN — INSULIN DETEMIR 20 UNITS: 100 INJECTION, SOLUTION SUBCUTANEOUS at 08:11

## 2022-11-27 RX ADMIN — PRAVASTATIN SODIUM 20 MG: 20 TABLET ORAL at 09:11

## 2022-11-27 RX ADMIN — PANTOPRAZOLE SODIUM 40 MG: 40 TABLET, DELAYED RELEASE ORAL at 09:11

## 2022-11-27 RX ADMIN — GABAPENTIN 800 MG: 400 CAPSULE ORAL at 09:11

## 2022-11-27 RX ADMIN — INSULIN ASPART 4 UNITS: 100 INJECTION, SOLUTION INTRAVENOUS; SUBCUTANEOUS at 04:11

## 2022-11-27 RX ADMIN — INSULIN ASPART 5 UNITS: 100 INJECTION, SOLUTION INTRAVENOUS; SUBCUTANEOUS at 04:11

## 2022-11-27 RX ADMIN — INSULIN ASPART 5 UNITS: 100 INJECTION, SOLUTION INTRAVENOUS; SUBCUTANEOUS at 06:11

## 2022-11-27 RX ADMIN — AMLODIPINE BESYLATE 5 MG: 5 TABLET ORAL at 09:11

## 2022-11-27 RX ADMIN — Medication 1 TABLET: at 04:11

## 2022-11-27 RX ADMIN — Medication 1 TABLET: at 09:11

## 2022-11-27 RX ADMIN — VANCOMYCIN HYDROCHLORIDE 1750 MG: 10 INJECTION, POWDER, LYOPHILIZED, FOR SOLUTION INTRAVENOUS at 12:11

## 2022-11-27 RX ADMIN — ENOXAPARIN SODIUM 40 MG: 40 INJECTION SUBCUTANEOUS at 04:11

## 2022-11-27 NOTE — PLAN OF CARE
Pt AAOx4.ABT therapy remains in progress. No adverse reactions noted, remains afebrile. RUE PICC. Dressing to right foot C/D/I, wound care performed as ordered. Wound vac to left foot to continuous suction. NWB to LLE. Accu checks AC&HS, insulin adm as ordered per sliding scale. Contact precautions remains in place. Call light in reach

## 2022-11-27 NOTE — PLAN OF CARE
Plan of care reviewed with patient with verbal understanding. Chart and orders reviewed.  Pt remains free from falls this shift, Safety precautions in place.   Pt currently resting comfortably in bed. Pain controlled with po pain med (see emar for pain admin). Wound vac with no leaks at 125 suction.   Hourly rounding with bed in lowest position, side rails up, call light in reach.  Will continue to monitor until end of shift.       Problem: Adult Inpatient Plan of Care  Goal: Plan of Care Review  11/27/2022 0404 by Abbie Mendes RN  Outcome: Ongoing, Progressing  11/27/2022 0404 by Abbie Mendes RN  Outcome: Ongoing, Progressing  Goal: Optimal Comfort and Wellbeing  Outcome: Ongoing, Progressing

## 2022-11-27 NOTE — ASSESSMENT & PLAN NOTE
- Cr initially improved with fluids, now uptrending again. ?IVVD due to diarrhea vs. ATN in setting of Vanc use   - urine lytes c/w prerenal etiology  - stop IVF given worsening hyponatremia   - monitor BMP; renally dose meds

## 2022-11-27 NOTE — PROGRESS NOTES
"Pharmacokinetic Assessment Follow Up: IV Vancomycin    Vancomycin serum concentration assessment(s):    The random level was drawn correctly and can be used to guide therapy at this time. The measurement is within the desired definitive target range of 15 to 20 mcg/mL.    Vancomycin Regimen Plan:    Change regimen to Vancomycin 1750 mg IV every 24 hours with next serum trough concentration measured at 2200 prior to 3rd dose on 11/28    Drug levels (last 3 results):  Recent Labs   Lab Result Units 11/24/22  1659 11/25/22 2014 11/26/22  2118   Vancomycin, Random ug/mL 18.0 18.1 19.5       Pharmacy will continue to follow and monitor vancomycin.    Please contact pharmacy at extension [phone] for questions regarding this assessment.    Thank you for the consult,   Rita Acosta       Patient brief summary:  Joseph Paris Jr. is a 52 y.o. male initiated on antimicrobial therapy with IV Vancomycin for treatment of bacteremia    The patient's current regimen is vancomycin 1750 mg IV every 24 hours.     Drug Allergies:   Review of patient's allergies indicates:   Allergen Reactions    Lisinopril Swelling    Penicillins Other (See Comments)     Pt unsure of reaction, stating "I just know I'm allergic"       Actual Body Weight:   133.6 kg    Renal Function:   Estimated Creatinine Clearance: 82.9 mL/min (A) (based on SCr of 1.5 mg/dL (H)).,     Dialysis Method (if applicable):  N/A    CBC (last 72 hours):  Recent Labs   Lab Result Units 11/24/22  0552 11/25/22  0640 11/26/22  0600   WBC K/uL 7.86 6.25 6.23   Hemoglobin g/dL 7.1* 6.5* 7.4*   Hematocrit % 23.2* 21.0* 23.6*   Platelets K/uL 449 396 402   Gran % % 61.1 53.5 56.0   Lymph % % 27.2 33.3 29.2   Mono % % 7.3 8.5 9.5   Eosinophil % % 3.2 3.5 3.7   Basophil % % 0.4 0.6 1.1   Differential Method  Automated Automated Automated       Metabolic Panel (last 72 hours):  Recent Labs   Lab Result Units 11/24/22  0552 11/24/22  0929 11/25/22  0640 11/26/22  0600 11/26/22  0931 "   Sodium mmol/L 135*  --  134* 127* 135*   Sodium, Urine mmol/L  --  56  --   --   --    Potassium mmol/L 3.9  --  3.9 4.1 3.4*   Chloride mmol/L 102  --  100 97 100   CO2 mmol/L 26  --  26 27 23   Glucose mg/dL 217*  --  159* 212* 109   BUN mg/dL 13  --  14 17 15   Creatinine mg/dL 1.6*  1.6*  --  1.6*  1.6* 1.7*  1.7* 1.5*   Creatinine, Urine mg/dL  --  68.6  --   --   --    Magnesium mg/dL 1.8  --   --   --   --        Vancomycin Administrations:  vancomycin given in the last 96 hours                     vancomycin 2 g in dextrose 5 % 500 mL IVPB (mg) 2,000 mg New Bag 11/25/22 2226    vancomycin 2 g in dextrose 5 % 500 mL IVPB (mg) 2,000 mg New Bag 11/24/22 2057    vancomycin 2 g in dextrose 5 % 500 mL IVPB (mg) 2,000 mg New Bag 11/23/22 1657                    Microbiologic Results:  Microbiology Results (last 7 days)       Procedure Component Value Units Date/Time    Clostridium difficile EIA [[id number]]     Order Status: Canceled Specimen: Stool

## 2022-11-27 NOTE — SUBJECTIVE & OBJECTIVE
Interval History: NAEON. Pt has no new complaints today. Was able to have a regular BM yesterday, no further diarrhea. Denies CP, SOB, leg pain, n/v.     Review of Systems  Objective:     Vital Signs (Most Recent):  Temp: 97.6 °F (36.4 °C) (11/27/22 0710)  Pulse: 91 (11/27/22 0710)  Resp: 15 (11/27/22 0710)  BP: (!) 173/83 (11/27/22 0710)  SpO2: 99 % (11/27/22 0710)   Vital Signs (24h Range):  Temp:  [97.5 °F (36.4 °C)-98.5 °F (36.9 °C)] 97.6 °F (36.4 °C)  Pulse:  [86-93] 91  Resp:  [15-19] 15  SpO2:  [95 %-99 %] 99 %  BP: (143-173)/(70-88) 173/83     Weight: 135.8 kg (299 lb 6.2 oz)  Body mass index is 37.42 kg/m².    Intake/Output Summary (Last 24 hours) at 11/27/2022 1252  Last data filed at 11/27/2022 0928  Gross per 24 hour   Intake 1115.03 ml   Output 200 ml   Net 915.03 ml      Physical Exam  Vitals and nursing note reviewed.     Constitutional:       General: He is not in acute distress.     Appearance: Normal appearance. Sitting up in bed, alert, conversant   Cardiovascular:      Rate and Rhythm: Normal rate and regular rhythm.      Heart sounds: No murmur heard.    No friction rub. No gallop.   Pulmonary:      Effort: Pulmonary effort is normal.      Breath sounds: Normal breath sounds. No wheezing, rhonchi or rales.   Abdominal:      General: Bowel sounds are normal. There is no distension.      Palpations: Abdomen is soft.      Tenderness: There is no abdominal tenderness. There is no guarding or rebound.   Musculoskeletal:      Comments: B/l feet with Ace dressing, L foot wound vac in place, trace edema to BLE   Neurological:      Mental Status: He is alert and oriented to person, place, and time. Mental status is at baseline.     Significant Labs: All pertinent labs within the past 24 hours have been reviewed.    Significant Imaging: I have reviewed all pertinent imaging results/findings within the past 24 hours.

## 2022-11-27 NOTE — ASSESSMENT & PLAN NOTE
- Na worse after NS administration   - stop IVF for now   - obtain urine and serum sodium and urine and serum osm   - monitor BMP

## 2022-11-27 NOTE — ASSESSMENT & PLAN NOTE
Likely antibiotic associated, pt afebrile and without leukocytosis   - resolved   - continue probiotic

## 2022-11-27 NOTE — PROGRESS NOTES
[facility] Medicine  Progress Note    Patient Name: Joseph Paris Jr.  MRN: [id number]  Patient Class: IP- Inpatient   Admission Date: 11/12/2022  Length of Stay: 14 days  Attending Physician: Sarika Madrid MD  Primary Care Provider: Thong Hidalgo Jr, MD        Subjective:     Principal Problem:Bacteremia due to group B Streptococcus    HPI:  52 y.o. male patient with a PMHx of HTN, DM, and CAD presents to the Emergency Department for evaluation of Bilateral Feet Swelling which onset gradually within the past week. The pt last saw their wound care three weeks ago. The pt lost his health insurance coverage and decided to take care of the wounds himself. The pt self drained pus from his wounds and used a heated water massager on his feet; reports feet look more infected than before. Symptoms are constant and moderate in severity. No mitigating or exacerbating factors reported. Associated sxs include fever and SOB. Patient denies any CP, N/V, weakness, dysuria, and all other sxs at this time      Overview/Hospital Course:  S/p I&D of foot per Podiatry, recommended MRI and angio with possible referral to vascular surgery. Concerns for viability of flap due to appearance of wounds. Will likely need to return to the OR later this week. Wound cultures collected during procedure, Blood culture: gram positive cocci in chains in both bottles, adjusted antibiotic regimen, added vancomycin. On 11/15- Blood cultures ultimately grew MDR group B strep, but sensitive to both Rocephin and Vancomycin. Staphylococcus speciated as well in blood, but sensitivities still pending. Angiogram ordered by Vascular surgery tentatively scheduled to be done on 11/15. Cannulization in R femoral artery successful. Patient reports notable improvement in his limb swelling and pain immediately after procedure. Further debridement tentatively scheduled for 11/17. Tolerated procedure well. Wound vac placed at that time as well.  PICC line inserted on 11/18/22. Definitive antibiotic recs placed by ID (see their note for specifics). Total duration of therapy 6 weeks.  Wound care following for wound vac management. Awaiting insurance approval for home abx, wound vac .      Interval History: NAEON. Pt has no new complaints today. Was able to have a regular BM yesterday, no further diarrhea. Denies CP, SOB, leg pain, n/v.     Review of Systems  Objective:     Vital Signs (Most Recent):  Temp: 97.6 °F (36.4 °C) (11/27/22 0710)  Pulse: 91 (11/27/22 0710)  Resp: 15 (11/27/22 0710)  BP: (!) 173/83 (11/27/22 0710)  SpO2: 99 % (11/27/22 0710)   Vital Signs (24h Range):  Temp:  [97.5 °F (36.4 °C)-98.5 °F (36.9 °C)] 97.6 °F (36.4 °C)  Pulse:  [86-93] 91  Resp:  [15-19] 15  SpO2:  [95 %-99 %] 99 %  BP: (143-173)/(70-88) 173/83     Weight: 135.8 kg (299 lb 6.2 oz)  Body mass index is 37.42 kg/m².    Intake/Output Summary (Last 24 hours) at 11/27/2022 1252  Last data filed at 11/27/2022 0928  Gross per 24 hour   Intake 1115.03 ml   Output 200 ml   Net 915.03 ml      Physical Exam  Vitals and nursing note reviewed.     Constitutional:       General: He is not in acute distress.     Appearance: Normal appearance. Sitting up in bed, alert, conversant   Cardiovascular:      Rate and Rhythm: Normal rate and regular rhythm.      Heart sounds: No murmur heard.    No friction rub. No gallop.   Pulmonary:      Effort: Pulmonary effort is normal.      Breath sounds: Normal breath sounds. No wheezing, rhonchi or rales.   Abdominal:      General: Bowel sounds are normal. There is no distension.      Palpations: Abdomen is soft.      Tenderness: There is no abdominal tenderness. There is no guarding or rebound.   Musculoskeletal:      Comments: B/l feet with Ace dressing, L foot wound vac in place, trace edema to BLE   Neurological:      Mental Status: He is alert and oriented to person, place, and time. Mental status is at baseline.     Significant Labs: All pertinent  labs within the past 24 hours have been reviewed.    Significant Imaging: I have reviewed all pertinent imaging results/findings within the past 24 hours.      Assessment/Plan:      * Bacteremia due to group B Streptococcus  - Continue Rocephin per ID   --ID following, recommending IV Daptomycin + Rocephin x 6 weeks  --ambulatory infusion orders placed for abx and wound vac, pending insurance/approval     Acute osteomyelitis of metatarsal bone of left foot  -s/p I&D on 11/13  -BC (11/13) grew group B strep- repeat cultures (11/14) no growth to date   -MRI of L FOOT: Large ulceration extending along 1st metatarsal shaft to 1st proximal phalanx. Abnormal bone marrow signal enhancement compatible with osteomyelitis.   -  vascular surgery consulted -Angiogram of bilateral LE's on 11/15 with Dr Berman. -successful recannulization of R femoral   -s/p  debridement and wound vac placement on 11/16 with Podiatry Dr. Augustine   - ID consulted; rec IV Dapto and Rocephin x 6 weeks EOT 12/29/22  -pending set up of wound vac and home IV abx due to issues with insurance       Bacteremia due to methicillin resistant Staphylococcus aureus  - Continue IV vanc per ID; dosing and monitoring of Vanc per pharmacy       TIERA (acute kidney injury)  - Cr initially improved with fluids, now uptrending again. ?IVVD due to diarrhea vs. ATN in setting of Vanc use   - urine lytes c/w prerenal etiology  - stop IVF given worsening hyponatremia   - monitor BMP; renally dose meds     Diarrhea  Likely antibiotic associated, pt afebrile and without leukocytosis   - resolved   - continue probiotic     Hyponatremia  - Na worse after NS administration   - stop IVF for now   - obtain urine and serum sodium and urine and serum osm   - monitor BMP       Acute on chronic anemia  - macrocytic, iron studies fairly unremarkable  - B12/folate within normal limits    -transfused 1u PRBC 11/25 with appropriate rise   - monitor CBC                                Decubitus ulcer of right heel  --wound care consulted and following  - continue local wound care       Diabetes mellitus  --continue basal insulin at 20 units QHS,continue AM insulin 15 u   --continue SSI, prandial insulin therapy-aspart 5 TIDWM  --Accuchecks ACQHS  - BG goal 140-180 for optimal wound healing         VTE Risk Mitigation (From admission, onward)         Ordered     enoxaparin injection 40 mg  Daily         11/12/22 2232     IP VTE HIGH RISK PATIENT  Once         11/12/22 2232     Place sequential compression device  Until discontinued         11/12/22 2232                Discharge Planning   KIT:      Code Status: Full Code   Is the patient medically ready for discharge?:     Reason for patient still in hospital (select all that apply): Pending disposition  Discharge Plan A: Home   Discharge Delays: None known at this time    Sarika Madrid MD  Department of Hospital Medicine   [facility]

## 2022-11-27 NOTE — ASSESSMENT & PLAN NOTE
- Continue Rocephin per ID   --ID following, recommending IV Daptomycin + Rocephin x 6 weeks  --ambulatory infusion orders placed for abx and wound vac, pending insurance/approval    11/13/2019          To Whom It May Concern:    Brennon Stiles is currently under my medical care and may not return to school at this time. Please excuse Richie Lesch for 1 days. He may return to work on November 14, 2019.   Activity is restricted as follows: n

## 2022-11-28 LAB
ANION GAP SERPL CALC-SCNC: 10 MMOL/L (ref 8–16)
BASOPHILS # BLD AUTO: 0.06 K/UL (ref 0–0.2)
BASOPHILS NFR BLD: 1 % (ref 0–1.9)
BUN SERPL-MCNC: 15 MG/DL (ref 6–20)
CALCIUM SERPL-MCNC: 7.8 MG/DL (ref 8.7–10.5)
CHLORIDE SERPL-SCNC: 99 MMOL/L (ref 95–110)
CO2 SERPL-SCNC: 26 MMOL/L (ref 23–29)
CREAT SERPL-MCNC: 1.6 MG/DL (ref 0.5–1.4)
CREAT SERPL-MCNC: 1.6 MG/DL (ref 0.5–1.4)
DIFFERENTIAL METHOD: ABNORMAL
EOSINOPHIL # BLD AUTO: 0.2 K/UL (ref 0–0.5)
EOSINOPHIL NFR BLD: 4 % (ref 0–8)
ERYTHROCYTE [DISTWIDTH] IN BLOOD BY AUTOMATED COUNT: 14.4 % (ref 11.5–14.5)
EST. GFR  (NO RACE VARIABLE): 51 ML/MIN/1.73 M^2
EST. GFR  (NO RACE VARIABLE): 51 ML/MIN/1.73 M^2
GLUCOSE SERPL-MCNC: 199 MG/DL (ref 70–110)
HCT VFR BLD AUTO: 24.1 % (ref 40–54)
HGB BLD-MCNC: 7.6 G/DL (ref 14–18)
IMM GRANULOCYTES # BLD AUTO: 0.02 K/UL (ref 0–0.04)
IMM GRANULOCYTES NFR BLD AUTO: 0.3 % (ref 0–0.5)
LYMPHOCYTES # BLD AUTO: 1.9 K/UL (ref 1–4.8)
LYMPHOCYTES NFR BLD: 31.9 % (ref 18–48)
MCH RBC QN AUTO: 31.1 PG (ref 27–31)
MCHC RBC AUTO-ENTMCNC: 31.5 G/DL (ref 32–36)
MCV RBC AUTO: 99 FL (ref 82–98)
MONOCYTES # BLD AUTO: 0.5 K/UL (ref 0.3–1)
MONOCYTES NFR BLD: 8.6 % (ref 4–15)
NEUTROPHILS # BLD AUTO: 3.1 K/UL (ref 1.8–7.7)
NEUTROPHILS NFR BLD: 54.2 % (ref 38–73)
NRBC BLD-RTO: 0 /100 WBC
PLATELET # BLD AUTO: 346 K/UL (ref 150–450)
PMV BLD AUTO: 9.1 FL (ref 9.2–12.9)
POCT GLUCOSE: 107 MG/DL (ref 70–110)
POCT GLUCOSE: 129 MG/DL (ref 70–110)
POCT GLUCOSE: 211 MG/DL (ref 70–110)
POCT GLUCOSE: 223 MG/DL (ref 70–110)
POTASSIUM SERPL-SCNC: 4 MMOL/L (ref 3.5–5.1)
RBC # BLD AUTO: 2.44 M/UL (ref 4.6–6.2)
SODIUM SERPL-SCNC: 135 MMOL/L (ref 136–145)
WBC # BLD AUTO: 5.8 K/UL (ref 3.9–12.7)

## 2022-11-28 PROCEDURE — 25000003 PHARM REV CODE 250: Performed by: STUDENT IN AN ORGANIZED HEALTH CARE EDUCATION/TRAINING PROGRAM

## 2022-11-28 PROCEDURE — 80048 BASIC METABOLIC PNL TOTAL CA: CPT | Performed by: INTERNAL MEDICINE

## 2022-11-28 PROCEDURE — 63600175 PHARM REV CODE 636 W HCPCS: Performed by: INTERNAL MEDICINE

## 2022-11-28 PROCEDURE — 27000207 HC ISOLATION

## 2022-11-28 PROCEDURE — 25000003 PHARM REV CODE 250: Performed by: PODIATRIST

## 2022-11-28 PROCEDURE — 21400001 HC TELEMETRY ROOM

## 2022-11-28 PROCEDURE — 25000003 PHARM REV CODE 250: Performed by: INTERNAL MEDICINE

## 2022-11-28 PROCEDURE — 25000003 PHARM REV CODE 250: Performed by: NURSE PRACTITIONER

## 2022-11-28 PROCEDURE — 63600175 PHARM REV CODE 636 W HCPCS: Performed by: PODIATRIST

## 2022-11-28 PROCEDURE — 63600175 PHARM REV CODE 636 W HCPCS: Performed by: STUDENT IN AN ORGANIZED HEALTH CARE EDUCATION/TRAINING PROGRAM

## 2022-11-28 PROCEDURE — 85025 COMPLETE CBC W/AUTO DIFF WBC: CPT | Performed by: INTERNAL MEDICINE

## 2022-11-28 RX ORDER — MORPHINE SULFATE 2 MG/ML
2 INJECTION, SOLUTION INTRAMUSCULAR; INTRAVENOUS EVERY 6 HOURS PRN
Status: DISCONTINUED | OUTPATIENT
Start: 2022-11-28 | End: 2022-11-29 | Stop reason: HOSPADM

## 2022-11-28 RX ADMIN — AMLODIPINE BESYLATE 5 MG: 5 TABLET ORAL at 08:11

## 2022-11-28 RX ADMIN — INSULIN ASPART 4 UNITS: 100 INJECTION, SOLUTION INTRAVENOUS; SUBCUTANEOUS at 06:11

## 2022-11-28 RX ADMIN — INSULIN DETEMIR 20 UNITS: 100 INJECTION, SOLUTION SUBCUTANEOUS at 09:11

## 2022-11-28 RX ADMIN — PANTOPRAZOLE SODIUM 40 MG: 40 TABLET, DELAYED RELEASE ORAL at 08:11

## 2022-11-28 RX ADMIN — INSULIN ASPART 5 UNITS: 100 INJECTION, SOLUTION INTRAVENOUS; SUBCUTANEOUS at 04:11

## 2022-11-28 RX ADMIN — INSULIN ASPART 5 UNITS: 100 INJECTION, SOLUTION INTRAVENOUS; SUBCUTANEOUS at 11:11

## 2022-11-28 RX ADMIN — CEFTRIAXONE 2 G: 2 INJECTION, POWDER, FOR SOLUTION INTRAMUSCULAR; INTRAVENOUS at 02:11

## 2022-11-28 RX ADMIN — INSULIN ASPART 5 UNITS: 100 INJECTION, SOLUTION INTRAVENOUS; SUBCUTANEOUS at 08:11

## 2022-11-28 RX ADMIN — GABAPENTIN 400 MG: 400 CAPSULE ORAL at 09:11

## 2022-11-28 RX ADMIN — PRAVASTATIN SODIUM 20 MG: 20 TABLET ORAL at 08:11

## 2022-11-28 RX ADMIN — MORPHINE SULFATE 2 MG: 2 INJECTION, SOLUTION INTRAMUSCULAR; INTRAVENOUS at 11:11

## 2022-11-28 RX ADMIN — GABAPENTIN 800 MG: 400 CAPSULE ORAL at 08:11

## 2022-11-28 RX ADMIN — ENOXAPARIN SODIUM 40 MG: 40 INJECTION SUBCUTANEOUS at 04:11

## 2022-11-28 RX ADMIN — INSULIN ASPART 4 UNITS: 100 INJECTION, SOLUTION INTRAVENOUS; SUBCUTANEOUS at 04:11

## 2022-11-28 RX ADMIN — INSULIN DETEMIR 15 UNITS: 100 INJECTION, SOLUTION SUBCUTANEOUS at 08:11

## 2022-11-28 RX ADMIN — AMITRIPTYLINE HYDROCHLORIDE 50 MG: 50 TABLET, FILM COATED ORAL at 09:11

## 2022-11-28 NOTE — PROGRESS NOTES
Therapy with vancomycin  complete and/or consult discontinued by provider.      Pharmacy will sign off, please re-consult as needed.    /Lydia Magdaleno [facility] 11/28/2022 9:38 AM

## 2022-11-28 NOTE — NURSING
11/28/22 1030   WOCN Assessment   WOCN Total Time (mins) 90   Visit Date 11/28/22   Visit Time 1030   Consult Type Follow Up   WOCN Speciality Wound   WOCN List wound vac   Wound I&D   Number of Wounds 5   Procedure wound vac   Intervention assessed;changed   Teaching on-going        Altered Skin Integrity Right Heel   No Date First Assessed or Time First Assessed found.   Altered Skin Integrity Present on Admission: yes  Side: Right  Location: Heel   Description of Altered Skin Integrity Full thickness tissue loss. Base is covered by slough and/or eschar in the wound bed   Dressing Appearance Dry;Intact;Clean   Drainage Amount None   Drainage Characteristics/Odor No odor   Appearance Eschar   Tissue loss description Full thickness   Black (%), Wound Tissue Color 100 %   Red (%), Wound Tissue Color 0 %   Yellow (%), Wound Tissue Color 0 %   Periwound Area Dry   Wound Length (cm) 5 cm   Wound Width (cm) 5 cm   Wound Surface Area (cm^2) 25 cm^2   Care Applied:;Povidone iodine   Dressing Elastic bandage;Gauze  (per patient request)   Dressing Change Due 11/29/22        Incision/Site 11/17/22 0736 Left Foot   Date First Assessed/Time First Assessed: 11/17/22 0736   Side: Left  Location: Foot   Wound Image    Incision WDL WDL   Dressing Appearance Dry;Intact;Clean   Drainage Amount None   Drainage Characteristics/Odor Serosanguineous   Appearance Pink;Slough  (very small amount of slough noted in pic/cleaned with vashe)   Black (%), Wound Tissue Color 0 %   Red (%), Wound Tissue Color 0 %   Yellow (%), Wound Tissue Color 5 %   Periwound Area Macerated  (slightly macerated around toe area.)   Wound Edges Irregular   Wound Length (cm) 12 cm   Wound Width (cm) 7.5 cm   Wound Depth (cm) 1.5 cm   Wound Volume (cm^3) 135 cm^3   Wound Surface Area (cm^2) 90 cm^2   Tunneling (depth (cm)/location) 0   Undermining (depth (cm)/location) 0   Care Cleansed with:;Wound cleanser   Dressing Other (comment)  (wound vac)         Negative Pressure Wound Therapy  11/17/22 0805 Left anterior   Placement Date/Time: 11/17/22 0805   Side: Left  Orientation: anterior  Location: Foot   NPWT Type Vacuum Therapy   Therapy Setting NPWT Continuous therapy   Pressure Setting NPWT 125 mmHg   Sponges Inserted NPWT White;Black;1  (one of each)   Sponges Removed NPWT Black;White;1  (one of each)       Below is pictured the left heel/foot. Eschar noted. Painted area with betadine. Wrapped with gauze and ace wrap per patient request.

## 2022-11-28 NOTE — PLAN OF CARE
Pt resting in bed, remains free of falls and injuries this shift.VSS. No obvious s/sx of distress noted. Pain managed with PRN medications. Wound vac in place, seal intact. Dressing to R heel C/D/I. POC reviewed with the patient, verbalized understanding. No further needs at this time, call light within reach. Will continue POC as ordered, chart check completed and all orders reviewed.

## 2022-11-28 NOTE — PROGRESS NOTES
"[facility] - Med Surg  Adult Nutrition  Progress Note    SUMMARY       Recommendations    Recommendation/Intervention:   1. When medically appropriate, recommend Diabetic diet- 2800 kcal/Renal diet  2. Recommend Dean BID for wound healing    Goals:   1. Pt will continue to meet > 75% EEN by RD follow up  Nutrition Goal Status: Continues  Communication of RD Recs: other (comment) (POC, sticky note)    Assessment and Plan    Nutrition Problem  Increased nutrient needs     Related to (etiology):   Wound healing     Signs and Symptoms (as evidenced by):   gangrene to foot and decubitus ulcer R heel. Wound vac in place, s/p debridement     Interventions(treatment strategy):  1. When medically appropriate, recommend Diabetic diet- 2800 kcal/Renal diet  2. Recommend Dean BID for wound healing  3. Collaboration of care with medical providers     Nutrition Diagnosis Status:   Continues       Malnutrition Assessment                                       Reason for Assessment    Reason For Assessment: length of stay  Diagnosis: other (see comments) (bacteremia)  Relevant Medical History: HTN, DM, and CAD, esophageal cancer  Interdisciplinary Rounds: did not attend  General Information Comments: RD remote for coverage, called pt on room phone- no answer. Pt LOS day 8. Per chart % intake at meals. Pt with gangrene to foot adn decubitus ulcer R heel. Wound vac in place, s/p debridement. POC -254. LBM 11/21. No recent wt history on file.  Nutrition Discharge Planning: Discharge on diabetic/cardiac diet with increased protein to promote wound healing    Follow up:  11/28: Pt is no contact, spoke to RN via secure chat to confirm pt has good appetite, 100% PO intake of meals, not experiencing any N/V/D, RN stated "...he has been eating 100% of his meals today for me...". Per Physician note 11/27 "Pt has no new complaints today. Was able to have a regular BM yesterday, no further diarrhea. Denies CP, SOB. Leg pain, n/v; " "Bowel sounds are normal. There is no distension". Reviewed chart: LBM 11/26; Skin: incision L foot WDL; altered skin integrity: R heel full thickness tissue loss- dry, clean, intact; Benjamin score: 20; Edema: 1+ trace R/L foot, leg. Labs, meds, weight reviewed. Na (L), Calcium (L), Glu (H), Cr (H), GFR 51; Note PRN ondansetron; Weight charted 11/27 299 lbs, previous weight charted since 2018. RD will continue to monitor.     Nutrition Risk Screen    Nutrition Risk Screen: no indicators present    Nutrition/Diet History    Spiritual, Cultural Beliefs, [employer]al Practices, Values that Affect Care: no  Factors Affecting Nutritional Intake: None identified at this time    Anthropometrics    Temp: 97.6 °F (36.4 °C)  Height Method: Stated  Height: 6' 3" (190.5 cm)  Height (inches): 75 in  Weight Method: Bed Scale  Weight: 135.8 kg (299 lb 6.2 oz)  Weight (lb): 299.39 lb  Ideal Body Weight (IBW), Male: 196 lb  % Ideal Body Weight, Male (lb): 125.98 %  BMI (Calculated): 37.4     Wt Readings from Last 15 Encounters:   11/27/22 135.8 kg (299 lb 6.2 oz)   07/20/18 (!) 138.6 kg (305 lb 7.2 oz)   10/21/17 127.6 kg (281 lb 4.9 oz)     Lab/Procedures/Meds    Pertinent Labs Reviewed: reviewed  Pertinent Labs Comments: POC -254, Na 133, Cl 94, Hgb A1c 10.8 on 7/26/22  Pertinent Medications Reviewed: reviewed  Pertinent Medications Comments: amlodipine, gabapentin, insulin, lactobacillus-acidoph- bulgar  BMP  Lab Results   Component Value Date     (L) 11/28/2022    K 4.0 11/28/2022    CL 99 11/28/2022    CO2 26 11/28/2022    BUN 15 11/28/2022    CREATININE 1.6 (H) 11/28/2022    CREATININE 1.6 (H) 11/28/2022    CALCIUM 7.8 (L) 11/28/2022    ANIONGAP 10 11/28/2022    EGFRNORACEVR 51 (A) 11/28/2022    EGFRNORACEVR 51 (A) 11/28/2022      Recent Labs   Lab 11/28/22  1117   POCTGLUCOSE 129*    Scheduled Meds:   amitriptyline  50 mg Oral QHS    amLODIPine  5 mg Oral Daily    cefTRIAXone (ROCEPHIN) IVPB  2 g Intravenous Q24H    " DAPTOmycin (CUBICIN) IV  1,000 mg Intravenous Q24H    enoxaparin  40 mg Subcutaneous Daily    gabapentin  400 mg Oral QHS    gabapentin  800 mg Oral Daily    insulin aspart U-100  5 Units Subcutaneous TIDWM    insulin detemir U-100  15 Units Subcutaneous Daily    insulin detemir U-100  20 Units Subcutaneous QHS    Lactobacillus acidoph-L.bulgar  1 tablet Oral TID WM    pantoprazole  40 mg Oral Daily    pravastatin  20 mg Oral Daily     Continuous Infusions:  PRN Meds:.sodium chloride, sodium chloride 0.9%, acetaminophen, acetaminophen, dextrose 10%, dextrose 10%, glucagon (human recombinant), glucose, glucose, hydrALAZINE, insulin aspart U-100, morphine, naloxone, ondansetron, oxyCODONE, sodium chloride 0.9%, sodium chloride 0.9%   Physical Findings/Assessment         Estimated/Assessed Needs    Weight Used For Calorie Calculations: 135.8 kg (299 lb 6.2 oz)  Energy Calorie Requirements (kcal): 8967-7719 (20-30 kcal/kg ABW (TIERA))  Energy Need Method: Kcal/kg  Protein Requirements: 108-136 (0.8-1.0 g/kg ABW (TIERA, no dialysis))  Weight Used For Protein Calculations: 135.8 kg (299 lb 6.2 oz)  Fluid Requirements (mL): 500 mL + total output (TIERA)  Estimated Fluid Requirement Method: other (see comments)  RDA Method (mL): 2716  CHO Requirement: 339-509 (3202-6559 kcal/8)      Nutrition Prescription Ordered    Current Diet Order: Diabetic diet - 2800 kcal    Evaluation of Received Nutrient/Fluid Intake  I/O: (Net since admit)  11/21:+5 L since admit  11/28: +59220.2 mL  Energy Calories Required: meeting needs  Protein Required: meeting needs  Fluid Required: not meeting needs  Comments: LBM 11/21  Tolerance: tolerating  % Intake of Estimated Energy Needs: 75 - 100 %  % Meal Intake: 75 - 100 %    Nutrition Risk    Level of Risk/Frequency of Follow-up: low (1x weekly)     Monitor and Evaluation    Food and Nutrient Intake: energy intake, food and beverage intake  Food and Nutrient Adminstration: diet  order  Knowledge/Beliefs/Attitudes: beliefs and attitudes  Physical Activity and Function: nutrition-related ADLs and IADLs  Anthropometric Measurements: weight change, weight, body mass index  Biochemical Data, Medical Tests and Procedures: gastrointestinal profile, glucose/endocrine profile, inflammatory profile, electrolyte and renal panel, lipid profile  Nutrition-Focused Physical Findings: overall appearance, extremities, muscles and bones, skin     Nutrition Follow-Up    RD Follow-up?: Yes  Luzmaria Meza, Registration Eligible, Provisional LDN

## 2022-11-28 NOTE — PLAN OF CARE
Nutrition Recommendations 11/28:  1. When medically appropriate, recommend Diabetic diet- 2800 kcal/Renal diet  2. Recommend Dean BID for wound healing  3. Collaboration of care with medical providers   Luzmaria Meza, Registration Eligible, Provisional LDN

## 2022-11-29 VITALS
RESPIRATION RATE: 20 BRPM | HEIGHT: 75 IN | WEIGHT: 299.38 LBS | HEART RATE: 90 BPM | OXYGEN SATURATION: 99 % | BODY MASS INDEX: 37.22 KG/M2 | DIASTOLIC BLOOD PRESSURE: 80 MMHG | SYSTOLIC BLOOD PRESSURE: 145 MMHG | TEMPERATURE: 98 F

## 2022-11-29 LAB
ANION GAP SERPL CALC-SCNC: 12 MMOL/L (ref 8–16)
BUN SERPL-MCNC: 14 MG/DL (ref 6–20)
CALCIUM SERPL-MCNC: 8.1 MG/DL (ref 8.7–10.5)
CHLORIDE SERPL-SCNC: 101 MMOL/L (ref 95–110)
CO2 SERPL-SCNC: 25 MMOL/L (ref 23–29)
CREAT SERPL-MCNC: 1.7 MG/DL (ref 0.5–1.4)
CREAT SERPL-MCNC: 1.7 MG/DL (ref 0.5–1.4)
EST. GFR  (NO RACE VARIABLE): 47 ML/MIN/1.73 M^2
EST. GFR  (NO RACE VARIABLE): 47 ML/MIN/1.73 M^2
GLUCOSE SERPL-MCNC: 116 MG/DL (ref 70–110)
POCT GLUCOSE: 109 MG/DL (ref 70–110)
POCT GLUCOSE: 162 MG/DL (ref 70–110)
POCT GLUCOSE: 254 MG/DL (ref 70–110)
POTASSIUM SERPL-SCNC: 4.1 MMOL/L (ref 3.5–5.1)
SODIUM SERPL-SCNC: 138 MMOL/L (ref 136–145)
VANCOMYCIN TROUGH SERPL-MCNC: 16.4 UG/ML (ref 10–22)

## 2022-11-29 PROCEDURE — 80048 BASIC METABOLIC PNL TOTAL CA: CPT | Performed by: INTERNAL MEDICINE

## 2022-11-29 PROCEDURE — 63600175 PHARM REV CODE 636 W HCPCS: Performed by: PODIATRIST

## 2022-11-29 PROCEDURE — 25000003 PHARM REV CODE 250: Performed by: PODIATRIST

## 2022-11-29 PROCEDURE — 80202 ASSAY OF VANCOMYCIN: CPT | Performed by: INTERNAL MEDICINE

## 2022-11-29 PROCEDURE — 25000003 PHARM REV CODE 250: Performed by: INTERNAL MEDICINE

## 2022-11-29 PROCEDURE — 63600175 PHARM REV CODE 636 W HCPCS: Performed by: INTERNAL MEDICINE

## 2022-11-29 PROCEDURE — 25000003 PHARM REV CODE 250: Performed by: NURSE PRACTITIONER

## 2022-11-29 RX ORDER — OXYCODONE HYDROCHLORIDE 5 MG/1
5 TABLET ORAL EVERY 8 HOURS PRN
Qty: 21 TABLET | Refills: 0 | Status: SHIPPED | OUTPATIENT
Start: 2022-11-29 | End: 2022-11-29 | Stop reason: SDUPTHER

## 2022-11-29 RX ORDER — OXYCODONE HYDROCHLORIDE 5 MG/1
5 TABLET ORAL EVERY 8 HOURS PRN
Qty: 21 TABLET | Refills: 0 | Status: SHIPPED | OUTPATIENT
Start: 2022-11-29 | End: 2022-12-06

## 2022-11-29 RX ADMIN — INSULIN ASPART 6 UNITS: 100 INJECTION, SOLUTION INTRAVENOUS; SUBCUTANEOUS at 05:11

## 2022-11-29 RX ADMIN — CEFTRIAXONE 2 G: 2 INJECTION, POWDER, FOR SOLUTION INTRAMUSCULAR; INTRAVENOUS at 12:11

## 2022-11-29 RX ADMIN — ENOXAPARIN SODIUM 40 MG: 40 INJECTION SUBCUTANEOUS at 05:11

## 2022-11-29 RX ADMIN — DAPTOMYCIN 1000 MG: 500 INJECTION, POWDER, LYOPHILIZED, FOR SOLUTION INTRAVENOUS at 01:11

## 2022-11-29 RX ADMIN — INSULIN ASPART 5 UNITS: 100 INJECTION, SOLUTION INTRAVENOUS; SUBCUTANEOUS at 05:11

## 2022-11-29 RX ADMIN — AMLODIPINE BESYLATE 5 MG: 5 TABLET ORAL at 09:11

## 2022-11-29 RX ADMIN — INSULIN ASPART 5 UNITS: 100 INJECTION, SOLUTION INTRAVENOUS; SUBCUTANEOUS at 11:11

## 2022-11-29 RX ADMIN — PRAVASTATIN SODIUM 20 MG: 20 TABLET ORAL at 09:11

## 2022-11-29 RX ADMIN — INSULIN ASPART 2 UNITS: 100 INJECTION, SOLUTION INTRAVENOUS; SUBCUTANEOUS at 11:11

## 2022-11-29 RX ADMIN — PANTOPRAZOLE SODIUM 40 MG: 40 TABLET, DELAYED RELEASE ORAL at 09:11

## 2022-11-29 RX ADMIN — INSULIN DETEMIR 15 UNITS: 100 INJECTION, SOLUTION SUBCUTANEOUS at 09:11

## 2022-11-29 RX ADMIN — INSULIN ASPART 5 UNITS: 100 INJECTION, SOLUTION INTRAVENOUS; SUBCUTANEOUS at 07:11

## 2022-11-29 RX ADMIN — GABAPENTIN 800 MG: 400 CAPSULE ORAL at 09:11

## 2022-11-29 NOTE — DISCHARGE SUMMARY
[facility] Medicine  Discharge Summary      Patient Name: Joseph Paris Jr.  MRN: [id number]  AMBROCIO: [id number]  Patient Class: IP- Inpatient  Admission Date: 11/12/2022  Hospital Length of Stay: 16 days  Discharge Date and Time:  11/29/2022 5:14 PM  Attending Physician: Jas Killian MD   Discharging Provider: Jas Killian MD  Primary Care Provider: Thong Hidalgo Jr, MD    Primary Care Team: Networked reference to record PCT     HPI:   52 y.o. male patient with a PMHx of HTN, DM, and CAD presents to the Emergency Department for evaluation of Bilateral Feet Swelling which onset gradually within the past week. The pt last saw their wound care three weeks ago. The pt lost his health insurance coverage and decided to take care of the wounds himself. The pt self drained pus from his wounds and used a heated water massager on his feet; reports feet look more infected than before. Symptoms are constant and moderate in severity. No mitigating or exacerbating factors reported. Associated sxs include fever and SOB. Patient denies any CP, N/V, weakness, dysuria, and all other sxs at this time      Procedure(s) (LRB):  INCISION AND DRAINAGE, FOOT (Left)  APPLICATION, WOUND VAC (Left)      Hospital Course:   S/p I&D of foot per Podiatry, recommended MRI and angio with possible referral to vascular surgery. Concerns for viability of flap due to appearance of wounds. Will likely need to return to the OR later this week. Wound cultures collected during procedure, Blood culture: gram positive cocci in chains in both bottles, adjusted antibiotic regimen, added vancomycin. On 11/15- Blood cultures ultimately grew MDR group B strep, but sensitive to both Rocephin and Vancomycin. Staphylococcus speciated as well in blood, but sensitivities still pending. Angiogram ordered by Vascular surgery tentatively scheduled to be done on 11/15. Cannulization in R femoral artery successful. Patient reports notable  improvement in his limb swelling and pain immediately after procedure. Further debridement tentatively scheduled for 11/17. Tolerated procedure well. Wound vac placed at that time as well. PICC line inserted on 11/18/22. Definitive antibiotic recs placed by ID (see their note for specifics). Total duration of therapy 6 weeks.  Wound care following for wound vac management. Awaiting insurance approval for home abx, wound vac .       Goals of Care Treatment Preferences:  Code Status: Full Code      Consults:   Consults (From admission, onward)        Status Ordering Provider     Inpatient consult to Social Work  Once        Provider:  (Not yet assigned)    Completed MOUNA MARX     Inpatient consult to Social Work  Once        Provider:  (Not yet assigned)    Completed MOUNA MARX     [facility]  Once        Provider:  (Not yet assigned)    Acknowledged MOUNA MARX     Inpatient consult to Vascular Surgery  Once        Provider:  Pierre Villegas IV, MD    Completed MARINE AUGUSTINE     Inpatient consult to Infectious Diseases  Once        Provider:  (Not yet assigned)    Acknowledged MARINE AUGUSTINE     Inpatient consult to Social Work  Once        Provider:  (Not yet assigned)    Completed MARINE AUGUSTINE     Inpatient consult to Podiatry  Once        Provider:  Marine Augustine DPM    Completed SIMONE JIMENEZ          No new Assessment & Plan notes have been filed under this hospital service since the last note was generated.  Service: Hospital Medicine    Final Active Diagnoses:    Diagnosis Date Noted POA    PRINCIPAL PROBLEM:  Bacteremia due to group B Streptococcus [R78.81, B95.1] 11/15/2022 Yes    Bacteremia due to methicillin resistant Staphylococcus aureus [R78.81, B95.62] 11/17/2022 Yes    Hyponatremia [E87.1] 11/27/2022 No    Acute on chronic anemia [D64.9] 11/24/2022 Yes    Diarrhea [R19.7] 11/22/2022 No    Decubitus ulcer of right heel [L89.619]  11/14/2022 Yes    Acute osteomyelitis of metatarsal bone of left foot [M86.172] 11/12/2022 Yes    TIERA (acute kidney injury) [N17.9] 11/12/2022 Yes    Diabetes mellitus [E11.9]  Yes      Problems Resolved During this Admission:    Diagnosis Date Noted Date Resolved POA    Abscess of foot including toes, left [L02.612] 11/13/2022 11/13/2022 Yes    Gas gangrene of foot [A48.0] 11/13/2022 11/24/2022 Yes    Severe sepsis [A41.9, R65.20] 11/12/2022 11/24/2022 Yes       Discharged Condition: good    Disposition:     Follow Up:   Follow-up Information     Jovan Morrow DPM. Schedule an appointment as soon as possible for a visit.    Specialty: Podiatry  Contact information:  [address] [place] [address] [address] [address]  [phone]             Compa Leal MD, [facility] Follow up.    Specialties: Infectious Diseases, Hospitalist  Contact information:  [address] [place] [address] [address] [place] [address]  [phone]             Thong Hidalgo Jr, MD Follow up in 2 week(s).    Specialty: Internal Medicine  Contact information:  [address] [place] [address]  [address]  [place] [address]  [phone]                       Patient Instructions:      Ambulatory referral/consult to Home Health   Standing Status: Future   Referral Priority: Routine Referral Type: Home Health   Referral Reason: Specialty Services Required   Requested Specialty: Home Health Services   Number of Visits Requested: 1     Ambulatory referral/consult to Wound Clinic   Standing Status: Future   Referral Priority: Routine Referral Type: Consultation   Referral Reason: Specialty Services Required   Requested Specialty: Wound Care   Number of Visits Requested: 1     Diet Cardiac     Activity as tolerated       Significant Diagnostic Studies: Labs:   BMP:   Recent Labs   Lab 11/28/22  0536 11/29/22  0523   * 116*   * 138   K 4.0 4.1   CL 99 101   CO2 26 25   BUN 15 14   CREATININE 1.6*  1.6* 1.7*  1.7*   CALCIUM 7.8* 8.1*   , CMP   Recent  Labs   Lab 11/28/22  0536 11/29/22  0523   * 138   K 4.0 4.1   CL 99 101   CO2 26 25   * 116*   BUN 15 14   CREATININE 1.6*  1.6* 1.7*  1.7*   CALCIUM 7.8* 8.1*   ANIONGAP 10 12   , CBC   Recent Labs   Lab 11/28/22  0536   WBC 5.80   HGB 7.6*   HCT 24.1*      , INR   Lab Results   Component Value Date    INR 1.0 11/17/2022   , Lipid Panel   Lab Results   Component Value Date    CHOL 144 04/05/2022    HDL 39 (L) 04/05/2022    LDLCALC 81 04/05/2022    TRIG 120 04/05/2022   , Troponin No results for input(s): TROPONINI in the last 168 hours. and All labs within the past 24 hours have been reviewed  Microbiology:   Blood Culture   Lab Results   Component Value Date    LABBLOO No growth after 5 days. 11/14/2022    LABBLOO No growth after 5 days. 11/14/2022    and Wound Culture: negative  Radiology:   Imaging Results           X-Ray Foot Complete Left (Final result)  Result time 11/12/22 19:55:06    Final result by Renzo Christopher MD (11/12/22 19:55:06)                 Impression:      Soft tissue emphysema and irregular margin of the distal aspect of the 1st metatarsal strongly concerning for acute osteomyelitis.    This report was flagged in Epic as abnormal.      Electronically signed by: Renzo Christopher  Date:    11/12/2022  Time:    19:55             Narrative:    EXAMINATION:  XR FOOT COMPLETE 3 VIEW LEFT    CLINICAL HISTORY:  .  Local infection of the skin and subcutaneous tissue, unspecified    TECHNIQUE:  AP, lateral and oblique views of the left foot were performed.    COMPARISON:  None    FINDINGS:  Soft tissue emphysema over the 1st digit and dorsum of the foot strongly concerning for infection.  Irregular margin of the 1st metatarsal concerning for osteomyelitis.  Correlation with MRI with contrast would be advised if clinically feasible and the patient is compatible.                                  Pending Diagnostic Studies:     None         Medications:  Reconciled Home Medications:       Medication List      START taking these medications    cefTRIAXone 2 g/50 mL Pgbk IVPB  Commonly known as: ROCEPHIN  Inject 50 mLs (2 g total) into the vein once daily.     oxyCODONE 5 MG immediate release tablet  Commonly known as: ROXICODONE  Take 1 tablet (5 mg total) by mouth every 8 (eight) hours as needed for Pain.     sodium chloride 0.9% SolP 50 mL with DAPTOmycin 500 mg SolR 1,000 mg  Inject 1,000 mg into the vein once daily.        CONTINUE taking these medications    blood sugar diagnostic Strp  2 (two) times daily.     calcium citrate-vitamin D3 315 mg-6.25 mcg (250 unit) Tab  Commonly known as: CALCITRATE-VITAMIN D  Take 1 tablet by mouth once daily.     dulaglutide 0.75 mg/0.5 mL pen injector  Commonly known as: TRULICITY  Inject 0.75 mg into the skin every 7 days.     empagliflozin 25 mg tablet  Commonly known as: JARDIANCE  Take 25 mg by mouth once daily.     gabapentin 300 MG capsule  Commonly known as: NEURONTIN  Take by mouth every evening. Pt takes 800mg daily and 400mg nightly     glipiZIDE 5 MG Tr24  Take 5 mg by mouth once daily.     losartan-hydrochlorothiazide 50-12.5 mg 50-12.5 mg per tablet  Commonly known as: HYZAAR  Take 1 tablet by mouth once daily.     omeprazole 20 MG capsule  Commonly known as: PRILOSEC  Take 1 capsule by mouth once daily.     pravastatin 20 MG tablet  Commonly known as: PRAVACHOL  Take 20 mg by mouth once daily.        STOP taking these medications    metFORMIN 750 MG ER 24hr tablet  Commonly known as: GLUCOPHAGE-XR            Indwelling Lines/Drains at time of discharge:   Lines/Drains/Airways     Peripherally Inserted Central Catheter Line  Duration           PICC Double Lumen 11/18/22 2137 right basilic 10 days                Time spent on the discharge of patient: 23 minutes of time spent on discharge including examining patient, providing discharge instructions, arranging follow-up and documentation.           Jas Killian MD  Department of [facility]  Medicine  [facility] Surg

## 2022-11-29 NOTE — CONSULTS
Referral sent to [employer], pending approval.    Update: Patient approved for IV abx at 100% per jewel with francisco. Per jewel patient will have fee of 100.00 for nursing charges due to the fact that patient can not be approved for home help. Per jewel patient was notified of fee, financial assistance was offered from [employer], and patient was agreeable.     Pending wound vac approval. Deborah spoke with Eneida at . Per Eneida, order was just assigned and is being reviewed. Will fu with deborah with status once approved.  11/29/2022   2:50PM      Update: Deborah spoke with eneida at [facility], per eneida vac is still pending. Per Eneida leave wound vac with charge nurse and provide her name and number of charge as well as email so that she can send the POD form to her and contact her once the vac is approved. Deborah spoke with NEREIDA Siddiqui charge, received info requested and provided it to Eneida at [facility]. Per eneida she will contact charge once vac is approved.   11/29/2022   4:03PM

## 2022-11-29 NOTE — PROGRESS NOTES
[facility] Medicine  Progress Note    Patient Name: Joseph Paris Jr.  MRN: [id number]  Patient Class: IP- Inpatient   Admission Date: 11/12/2022  Length of Stay: 16 days  Attending Physician: Jas Killian MD  Primary Care Provider: Thong Hidalgo Jr, MD        Subjective:     Principal Problem:Bacteremia due to group B Streptococcus        HPI:  52 y.o. male patient with a PMHx of HTN, DM, and CAD presents to the Emergency Department for evaluation of Bilateral Feet Swelling which onset gradually within the past week. The pt last saw their wound care three weeks ago. The pt lost his health insurance coverage and decided to take care of the wounds himself. The pt self drained pus from his wounds and used a heated water massager on his feet; reports feet look more infected than before. Symptoms are constant and moderate in severity. No mitigating or exacerbating factors reported. Associated sxs include fever and SOB. Patient denies any CP, N/V, weakness, dysuria, and all other sxs at this time      Overview/Hospital Course:  S/p I&D of foot per Podiatry, recommended MRI and angio with possible referral to vascular surgery. Concerns for viability of flap due to appearance of wounds. Will likely need to return to the OR later this week. Wound cultures collected during procedure, Blood culture: gram positive cocci in chains in both bottles, adjusted antibiotic regimen, added vancomycin. On 11/15- Blood cultures ultimately grew MDR group B strep, but sensitive to both Rocephin and Vancomycin. Staphylococcus speciated as well in blood, but sensitivities still pending. Angiogram ordered by Vascular surgery tentatively scheduled to be done on 11/15. Cannulization in R femoral artery successful. Patient reports notable improvement in his limb swelling and pain immediately after procedure. Further debridement tentatively scheduled for 11/17. Tolerated procedure well. Wound vac placed at that time as  well. PICC line inserted on 11/18/22. Definitive antibiotic recs placed by ID (see their note for specifics). Total duration of therapy 6 weeks.  Wound care following for wound vac management. Awaiting insurance approval for home abx, wound vac .      Interval History: No acute events overnight. Doing well this AM with no complaints at our encounter. Denies CP, SOB, Abdominal pain, fevers/chills.    Review of Systems  Objective:     Vital Signs (Most Recent):  Temp: 98.4 °F (36.9 °C) (11/29/22 1241)  Pulse: 83 (11/29/22 1241)  Resp: 20 (11/29/22 1241)  BP: (!) 176/87 (11/29/22 1241)  SpO2: 99 % (11/29/22 1241)   Vital Signs (24h Range):  Temp:  [98 °F (36.7 °C)-98.6 °F (37 °C)] 98.4 °F (36.9 °C)  Pulse:  [83-90] 83  Resp:  [18-20] 20  SpO2:  [96 %-99 %] 99 %  BP: (132-176)/(69-87) 176/87     Weight: 135.8 kg (299 lb 6.2 oz)  Body mass index is 37.42 kg/m².    Intake/Output Summary (Last 24 hours) at 11/29/2022 1651  Last data filed at 11/29/2022 0800  Gross per 24 hour   Intake 480 ml   Output 100 ml   Net 380 ml      Physical Exam  Unchanged from previous exams  GEN: No acute distress, pleasant, body habitus normal  SKIN: left foot wound vac applied, R foot bandaged, dressing is c/d/I- still no active drainage on inspection  HEENT: atraumatic and normocephalic  CARDS: regular rate and rhythm, no m/g, pulses palpable in LE  PULM: breathing comfortably on room air, chest symmetric, nonlabored, no abnormal breath sounds on auscultation  ABD: nontender, nondistended, soft, no organomegaly, BS+  Neuro: Alert and oriented x3, CN's I-IX grossly intact, sensation and motor intact; follows directions and answers questions appropriately    Significant Labs: All pertinent labs within the past 24 hours have been reviewed.  Recent Lab Results  (Last 5 results in the past 24 hours)        11/29/22  1151   11/29/22  0602   11/29/22  0523   11/29/22  0017   11/28/22  2109        Anion Gap     12           BUN     14            Calcium     8.1           Chloride     101           CO2     25           Creatinine     1.7                1.7           eGFR     47                47           Glucose     116           POCT Glucose 162   109       107       Potassium     4.1           Sodium     138           Vancomycin-Trough       16.4                                Significant Imaging: I have reviewed all pertinent imaging results/findings within the past 24 hours.  Imaging Results               X-Ray Foot Complete Left (Final result)  Result time 11/12/22 19:55:06      Final result by Renzo Christopher MD (11/12/22 19:55:06)                   Impression:      Soft tissue emphysema and irregular margin of the distal aspect of the 1st metatarsal strongly concerning for acute osteomyelitis.    This report was flagged in Epic as abnormal.      Electronically signed by: Renzo Christopher  Date:    11/12/2022  Time:    19:55               Narrative:    EXAMINATION:  XR FOOT COMPLETE 3 VIEW LEFT    CLINICAL HISTORY:  .  Local infection of the skin and subcutaneous tissue, unspecified    TECHNIQUE:  AP, lateral and oblique views of the left foot were performed.    COMPARISON:  None    FINDINGS:  Soft tissue emphysema over the 1st digit and dorsum of the foot strongly concerning for infection.  Irregular margin of the 1st metatarsal concerning for osteomyelitis.  Correlation with MRI with contrast would be advised if clinically feasible and the patient is compatible.                                          Assessment/Plan:      * Bacteremia due to group B Streptococcus  - Continue Rocephin per ID   --ID following, recommending IV Daptomycin + Rocephin x 6 weeks  --ambulatory infusion orders placed for abx and wound vac, pending insurance/approval     Bacteremia due to methicillin resistant Staphylococcus aureus  - Continue IV vanc per ID; dosing and monitoring of Vanc per pharmacy       Hyponatremia  - Na worse after NS administration   - stop IVF for  now   - obtain urine and serum sodium and urine and serum osm   - monitor BMP       Acute on chronic anemia  - macrocytic, iron studies fairly unremarkable  - B12/folate within normal limits    -transfused 1u PRBC 11/25 with appropriate rise   - monitor CBC                               Diarrhea  Likely antibiotic associated, pt afebrile and without leukocytosis   - resolved   - continue probiotic     Decubitus ulcer of right heel  --wound care consulted and following  - continue local wound care       Diabetes mellitus  --continue basal insulin at 20 units QHS,continue AM insulin 15 u   --continue SSI, prandial insulin therapy-aspart 5 TIDWM  --Accuchecks ACQHS  - BG goal 140-180 for optimal wound healing       TIERA (acute kidney injury)  - Cr initially improved with fluids, now uptrending again. ?IVVD due to diarrhea vs. ATN in setting of Vanc use   - urine lytes c/w prerenal etiology  - stop IVF given worsening hyponatremia   - monitor BMP; renally dose meds     Acute osteomyelitis of metatarsal bone of left foot  -s/p I&D on 11/13  -BC (11/13) grew group B strep- repeat cultures (11/14) no growth to date   -MRI of L FOOT: Large ulceration extending along 1st metatarsal shaft to 1st proximal phalanx. Abnormal bone marrow signal enhancement compatible with osteomyelitis.   -  vascular surgery consulted -Angiogram of bilateral LE's on 11/15 with Dr Berman. -successful recannulization of R femoral   -s/p  debridement and wound vac placement on 11/16 with Podiatry Dr. Augustine   - ID consulted; rec IV Dapto and Rocephin x 6 weeks EOT 12/29/22  -pending set up of wound vac and home IV abx due to issues with insurance         VTE Risk Mitigation (From admission, onward)         Ordered     enoxaparin injection 40 mg  Daily         11/12/22 2232     IP VTE HIGH RISK PATIENT  Once         11/12/22 2232     Place sequential compression device  Until discontinued         11/12/22 2232                Discharge Planning    KIT:      Code Status: Full Code   Is the patient medically ready for discharge?:     Reason for patient still in hospital (select all that apply): Pending disposition  Discharge Plan A: Home   Discharge Delays: None known at this time    Pending wound vac approval and IV infusion set up        Jas Killian MD  Department of Hospital Medicine   [facility]

## 2022-11-29 NOTE — PLAN OF CARE
Wound vac from along with supporting clinicals faxed to [place] at [facility], pending approval.

## 2022-11-29 NOTE — SUBJECTIVE & OBJECTIVE
Interval History: No acute events overnight. Doing well this AM with no complaints at our encounter. Denies CP, SOB, Abdominal pain, fevers/chills.    Review of Systems  Objective:     Vital Signs (Most Recent):  Temp: 98.4 °F (36.9 °C) (11/29/22 1241)  Pulse: 83 (11/29/22 1241)  Resp: 20 (11/29/22 1241)  BP: (!) 176/87 (11/29/22 1241)  SpO2: 99 % (11/29/22 1241)   Vital Signs (24h Range):  Temp:  [98 °F (36.7 °C)-98.6 °F (37 °C)] 98.4 °F (36.9 °C)  Pulse:  [83-90] 83  Resp:  [18-20] 20  SpO2:  [96 %-99 %] 99 %  BP: (132-176)/(69-87) 176/87     Weight: 135.8 kg (299 lb 6.2 oz)  Body mass index is 37.42 kg/m².    Intake/Output Summary (Last 24 hours) at 11/29/2022 1651  Last data filed at 11/29/2022 0800  Gross per 24 hour   Intake 480 ml   Output 100 ml   Net 380 ml      Physical Exam  Unchanged from previous exams  GEN: No acute distress, pleasant, body habitus normal  SKIN: left foot wound vac applied, R foot bandaged, dressing is c/d/I- still no active drainage on inspection  HEENT: atraumatic and normocephalic  CARDS: regular rate and rhythm, no m/g, pulses palpable in LE  PULM: breathing comfortably on room air, chest symmetric, nonlabored, no abnormal breath sounds on auscultation  ABD: nontender, nondistended, soft, no organomegaly, BS+  Neuro: Alert and oriented x3, CN's I-IX grossly intact, sensation and motor intact; follows directions and answers questions appropriately    Significant Labs: All pertinent labs within the past 24 hours have been reviewed.  Recent Lab Results  (Last 5 results in the past 24 hours)        11/29/22  1151   11/29/22  0602   11/29/22  0523   11/29/22  0017   11/28/22  2109        Anion Gap     12           BUN     14           Calcium     8.1           Chloride     101           CO2     25           Creatinine     1.7                1.7           eGFR     47                47           Glucose     116           POCT Glucose 162   109       107       Potassium     4.1            Sodium     138           Vancomycin-Trough       16.4                                Significant Imaging: I have reviewed all pertinent imaging results/findings within the past 24 hours.  Imaging Results               X-Ray Foot Complete Left (Final result)  Result time 11/12/22 19:55:06      Final result by Renzo Christopher MD (11/12/22 19:55:06)                   Impression:      Soft tissue emphysema and irregular margin of the distal aspect of the 1st metatarsal strongly concerning for acute osteomyelitis.    This report was flagged in Epic as abnormal.      Electronically signed by: Renzo Christopher  Date:    11/12/2022  Time:    19:55               Narrative:    EXAMINATION:  XR FOOT COMPLETE 3 VIEW LEFT    CLINICAL HISTORY:  .  Local infection of the skin and subcutaneous tissue, unspecified    TECHNIQUE:  AP, lateral and oblique views of the left foot were performed.    COMPARISON:  None    FINDINGS:  Soft tissue emphysema over the 1st digit and dorsum of the foot strongly concerning for infection.  Irregular margin of the 1st metatarsal concerning for osteomyelitis.  Correlation with MRI with contrast would be advised if clinically feasible and the patient is compatible.

## 2022-11-30 ENCOUNTER — TELEPHONE (OUTPATIENT)
Dept: PODIATRY | Facility: CLINIC | Age: 52
End: 2022-11-30
Payer: MEDICAID

## 2022-11-30 NOTE — PLAN OF CARE
[facility] - Med Surg  Discharge Final Note    Primary Care Provider: Thong Hidalgo Jr, MD    Expected Discharge Date: 11/29/2022    Final Discharge Note (most recent)       Final Note - 11/30/22 0919          Final Note    Assessment Type Final Discharge Note     Anticipated Discharge Disposition Home or Self Care     Hospital Resources/Appts/Education Provided Provided patient/caregiver with written discharge plan information;Appointments scheduled and added to AVS        Post-Acute Status    Discharge Delays None known at this time                     Important Message from Medicare             Contact Info       Jovan Morrow DPM   Specialty: Podiatry    [address] [place] [address]   [facility] [address]   Phone: [phone]       Next Steps: Schedule an appointment as soon as possible for a visit    Compa Leal MD, [facility]   Specialty: Infectious Diseases, Hospitalist    [address] [place] [id number]   Phone: [phone]       Next Steps: Follow up    Thong Hidalgo Jr, MD   Specialty: Internal Medicine   Relationship: PCP - General    [facility]  [address]  [address]  [facility] [address]   Phone: [phone]       Next Steps: Follow up in 2 week(s)          Patient dc home with self care. Wound vac was approved by [facility] and delivered to the bedside by the charge nurse Alicia. No other cm needs.

## 2022-11-30 NOTE — PLAN OF CARE
Pt ready for discharge, PICC line left in place for home IV antibiotics. Home wound vac hooked up and reviewed with the patient, verbalized understanding. Discharge instructions reviewed with the patient, verbalized understanding. Printed prescription given to the patient. Patient waiting for ride to arrive.

## 2022-12-01 ENCOUNTER — PATIENT OUTREACH (OUTPATIENT)
Dept: ADMINISTRATIVE | Facility: CLINIC | Age: 52
End: 2022-12-01
Payer: MEDICAID

## 2022-12-01 ENCOUNTER — PATIENT MESSAGE (OUTPATIENT)
Dept: ADMINISTRATIVE | Facility: CLINIC | Age: 52
End: 2022-12-01
Payer: MEDICAID

## 2022-12-01 ENCOUNTER — OFFICE VISIT (OUTPATIENT)
Dept: PODIATRY | Facility: CLINIC | Age: 52
End: 2022-12-01
Payer: MEDICAID

## 2022-12-01 DIAGNOSIS — E11.40 POORLY CONTROLLED TYPE 2 DIABETES MELLITUS WITH NEUROPATHY: ICD-10-CM

## 2022-12-01 DIAGNOSIS — E11.65 POORLY CONTROLLED TYPE 2 DIABETES MELLITUS WITH NEUROPATHY: ICD-10-CM

## 2022-12-01 DIAGNOSIS — Z22.322 MRSA (METHICILLIN RESISTANT STAPH AUREUS) CULTURE POSITIVE: ICD-10-CM

## 2022-12-01 DIAGNOSIS — B95.1 BACTEREMIA DUE TO GROUP B STREPTOCOCCUS: Primary | ICD-10-CM

## 2022-12-01 DIAGNOSIS — E11.649 UNCONTROLLED TYPE 2 DIABETES MELLITUS WITH HYPOGLYCEMIA WITHOUT COMA: ICD-10-CM

## 2022-12-01 DIAGNOSIS — L97.523 FOOT ULCER, LEFT, WITH NECROSIS OF MUSCLE: Primary | ICD-10-CM

## 2022-12-01 DIAGNOSIS — R78.81 BACTEREMIA DUE TO GROUP B STREPTOCOCCUS: Primary | ICD-10-CM

## 2022-12-01 PROCEDURE — 99213 PR OFFICE/OUTPT VISIT, EST, LEVL III, 20-29 MIN: ICD-10-PCS | Mod: 25,S$PBB,, | Performed by: PODIATRIST

## 2022-12-01 PROCEDURE — 99999 PR PBB SHADOW E&M-EST. PATIENT-LVL III: ICD-10-PCS | Mod: PBBFAC,,, | Performed by: PODIATRIST

## 2022-12-01 PROCEDURE — 1159F PR MEDICATION LIST DOCUMENTED IN MEDICAL RECORD: ICD-10-PCS | Mod: CPTII,,, | Performed by: PODIATRIST

## 2022-12-01 PROCEDURE — 1160F PR REVIEW ALL MEDS BY PRESCRIBER/CLIN PHARMACIST DOCUMENTED: ICD-10-PCS | Mod: CPTII,,, | Performed by: PODIATRIST

## 2022-12-01 PROCEDURE — 1111F DSCHRG MED/CURRENT MED MERGE: CPT | Mod: CPTII,,, | Performed by: PODIATRIST

## 2022-12-01 PROCEDURE — 1159F MED LIST DOCD IN RCRD: CPT | Mod: CPTII,,, | Performed by: PODIATRIST

## 2022-12-01 PROCEDURE — 1160F RVW MEDS BY RX/DR IN RCRD: CPT | Mod: CPTII,,, | Performed by: PODIATRIST

## 2022-12-01 PROCEDURE — 99213 OFFICE O/P EST LOW 20 MIN: CPT | Mod: 25,S$PBB,, | Performed by: PODIATRIST

## 2022-12-01 PROCEDURE — 1111F PR DISCHARGE MEDS RECONCILED W/ CURRENT OUTPATIENT MED LIST: ICD-10-PCS | Mod: CPTII,,, | Performed by: PODIATRIST

## 2022-12-01 PROCEDURE — 99213 OFFICE O/P EST LOW 20 MIN: CPT | Mod: PBBFAC | Performed by: PODIATRIST

## 2022-12-01 PROCEDURE — 97605 NEG PRS WND THER DME<=50SQCM: CPT | Mod: PBBFAC | Performed by: PODIATRIST

## 2022-12-01 PROCEDURE — 99999 PR PBB SHADOW E&M-EST. PATIENT-LVL III: CPT | Mod: PBBFAC,,, | Performed by: PODIATRIST

## 2022-12-01 PROCEDURE — 97605 NEG PRS WND THER DME<=50SQCM: CPT | Mod: S$PBB,,, | Performed by: PODIATRIST

## 2022-12-01 PROCEDURE — 97605 PR NEG PRESS WOUND THERAPY (NPWT) W/NON-DISPOSABLE WOUND VAC DEVICE (DME), <=50 CM: ICD-10-PCS | Mod: S$PBB,,, | Performed by: PODIATRIST

## 2022-12-01 NOTE — PROGRESS NOTES
"  Subjective:       Patient ID: Joseph Paris Jr. is a 52 y.o. male.    Chief Complaint: Follow-up (0 pain, Diabetic, Last PCP 7/26/2022.)    HPI: Joseph Paris Jr. presents to the office today status post incision drainage of left foot gas gangrene.  He was recently discharged from the hospital with a wound VAC present.  Wound VAC is functioning at 120 mmHg continuous suction.  He does have a scheduled appointment with [facility] on Tuesday of next week.  States 0/10 pain.  Currently utilizing a knee scooter for mobility.    Review of patient's allergies indicates:   Allergen Reactions    Lisinopril Swelling    Penicillins Other (See Comments)     Pt unsure of reaction, stating "I just know I'm allergic"       Past Medical History:   Diagnosis Date    Coronary artery disease     Diabetes mellitus     Esophageal cancer     Gas gangrene of foot 11/13/2022    Hypertension        Family History   Problem Relation Age of Onset    Heart disease Mother     Stroke Mother     Cancer Father     Diabetes Sister     Diabetes Brother        Social History     Socioeconomic History    Marital status: Single   Tobacco Use    Smoking status: Former    Smokeless tobacco: Never   Substance and Sexual Activity    Alcohol use: Yes    Drug use: No     Social Determinants of Health     Financial Resource Strain: Low Risk     Difficulty of Paying Living Expenses: Not hard at all   Food Insecurity: No Food Insecurity    Worried About Running Out of Food in the Last Year: Never true    Ran Out of Food in the Last Year: Never true   Transportation Needs: No Transportation Needs    Lack of Transportation (Medical): No    Lack of Transportation (Non-Medical): No   Physical Activity: Inactive    Days of Exercise per Week: 0 days    Minutes of Exercise per Session: 0 min   Stress: Stress Concern Present    Feeling of Stress : Very much   Social Connections: Socially Isolated    Frequency of Communication with Friends and " Family: More than three times a week    Frequency of Social Gatherings with Friends and Family: More than three times a week    Attends Protestant Services: Never    Active Member of Clubs or Organizations: No    Attends Club or Organization Meetings: Never    Marital Status:    Housing Stability: Low Risk     Unable to Pay for Housing in the Last Year: No    Number of Places Lived in the Last Year: 1    Unstable Housing in the Last Year: No       Past Surgical History:   Procedure Laterality Date    ANGIOGRAPHY OF LOWER EXTREMITY N/A 11/15/2022    Procedure: ANGIOGRAM, LOWER EXTREMITY/left leg poss pta/stent;  Surgeon: Cameron Berman MD;  Location: [facility] CATH LAB;  Service: Peripheral Vascular;  Laterality: N/A;  possible 130 start time    APPLICATION OF WOUND VACUUM-ASSISTED CLOSURE DEVICE Left 11/17/2022    Procedure: APPLICATION, WOUND VAC;  Surgeon: Sierra Augustine DPM;  Location: [facility] OR;  Service: Podiatry;  Laterality: Left;    ATHERECTOMY  11/15/2022    Procedure: Atherectomy;  Surgeon: Cameron Berman MD;  Location: [facility] CATH LAB;  Service: Peripheral Vascular;;    CORONARY ARTERY BYPASS GRAFT      FEMORAL BYPASS      INCISION AND DRAINAGE FOOT Left 11/13/2022    Procedure: INCISION AND DRAINAGE, FOOT;  Surgeon: Sierra Augustine DPM;  Location: [facility] OR;  Service: Podiatry;  Laterality: Left;    INCISION AND DRAINAGE FOOT Left 11/17/2022    Procedure: INCISION AND DRAINAGE, FOOT;  Surgeon: Sierra Augustine DPM;  Location: [facility] OR;  Service: Podiatry;  Laterality: Left;    PERCUTANEOUS TRANSLUMINAL ANGIOPLASTY N/A 11/15/2022    Procedure: PTA (ANGIOPLASTY, PERCUTANEOUS, TRANSLUMINAL);  Surgeon: Cameron Berman MD;  Location: [facility] CATH LAB;  Service: Peripheral Vascular;  Laterality: N/A;       Review of Systems       Objective:   There were no vitals taken for this visit.    X-Ray Chest 1 View  Narrative: EXAMINATION:  XR CHEST 1 VIEW    CLINICAL HISTORY:  PICC  Placement;    COMPARISON:  None    FINDINGS:  Lungs are underinflated without consolidation.  No pleural effusion or shift of the mediastinum right PICC line appear in good position distal tip expected SVC.  No convincing pneumothorax.  Impression: Satisfactory post line placement radiograph    Electronically signed by: Ashley Rojo  Date:    11/18/2022  Time:    22:10       Physical Exam   LOWER EXTREMITY PHYSICAL EXAMINATION    Vascular:    The left dorsalis pedis pulse 2/4 and posterior tibial pulse on the left is 1/4.  Capillary refill is intact.  Pedal hair growth decreased.     Neurological:  Protective sensation is not intact to the right left foot.  Vibratory sensation is diminished.  Proprioception is intact.  Sharp/dull is reduced.    Musculoskeletal: Manual Muscle Testing is 5/5 with dorsiflexion, plantar flexion, abduction, and adduction.       Dermatological:  Skin is supple and moist.  Slight maceration noted to the medial aspect of the 1st metatarsophalangeal joint.  Large ulceration present to the dorsal aspect of the foot which persist from the proximal great toe throughout the dorsal aspect of the midfoot.  Ulceration measures 12 cm x 6 cm.  Extensor hallucis tendon is noted but appears to be filling in quite well.  There is no signs of abscess.     Imaging:         Results for orders placed during the hospital encounter of 11/12/22    X-Ray Foot Complete Left    Narrative  EXAMINATION:  XR FOOT COMPLETE 3 VIEW LEFT    CLINICAL HISTORY:  .  Local infection of the skin and subcutaneous tissue, unspecified    TECHNIQUE:  AP, lateral and oblique views of the left foot were performed.    COMPARISON:  None    FINDINGS:  Soft tissue emphysema over the 1st digit and dorsum of the foot strongly concerning for infection.  Irregular margin of the 1st metatarsal concerning for osteomyelitis.  Correlation with MRI with contrast would be advised if clinically feasible and the patient is  compatible.    Impression  Soft tissue emphysema and irregular margin of the distal aspect of the 1st metatarsal strongly concerning for acute osteomyelitis.    This report was flagged in Epic as abnormal.      Electronically signed by: Renzo Christopher  Date:    11/12/2022  Time:    19:55       No results found for this or any previous visit.          Assessment:     1. Foot ulcer, left, with necrosis of muscle    2. Uncontrolled type 2 diabetes mellitus with hypoglycemia without coma    3. Poorly controlled type 2 diabetes mellitus with neuropathy        Plan:     Foot ulcer, left, with necrosis of muscle    Uncontrolled type 2 diabetes mellitus with hypoglycemia without coma    Poorly controlled type 2 diabetes mellitus with neuropathy    Thorough discussion is had with the patient today, concerning the diagnosis, its etiology, and the treatment algorithm at present.    Wound VAC was replaced to the dorsal aspect of the left foot.  Protective layer was applied circumferentially around the wound.  White foam applied dorsally over the extensor hallucis tendon.  Black foam to cover.  The VAC was held in place and tested for seal check.  All was functioning well.  VAC will remain in place at 120 mmHg with continuous suction.  We discussed the importance of removing the VAC if there is signs of seal problems or the VAC does shut off for any reason at all.  Recommend he applies a dry dressing thereafter.  He does have a scheduled appointment on Tuesday of next week with [facility] will subsequently resume wound care on the right foot and the bilateral heels.    Continue IV antibiotics per Infectious Disease      Future Appointments   Date Time Provider Department Center   1/9/2023 11:00 AM Compa Leal MD, [facility]

## 2022-12-01 NOTE — PROGRESS NOTES
C3 nurse attempted to contact Joseph Paris Jr.  for a TCC post hospital discharge follow up call. No answer. The patient does not have a scheduled HOSFU appointment, and the pt does not have an [facility] PCP.

## 2022-12-02 NOTE — PROGRESS NOTES
C3 nurse spoke with Joseph Paris Jr.  for a TCC post hospital discharge follow up call. The patient does not have a scheduled HOSFU appointment with Thong Hidalgo Jr, MD, a non-[facility] PCP, within 5-7 days post hospital discharge date 11/29/2022. NP referral placed for Shana Moreau NP

## 2022-12-02 NOTE — PROGRESS NOTES
2nd attempt-C3 nurse attempted to contact Joseph Paris Jr.  for a TCC post hospital discharge follow up call. No answer. The patient does not have a scheduled HOSFU appointment, and the pt does not have an [facility] PCP.

## 2022-12-05 ENCOUNTER — PES CALL (OUTPATIENT)
Dept: ADMINISTRATIVE | Facility: CLINIC | Age: 52
End: 2022-12-05
Payer: MEDICAID

## 2022-12-06 ENCOUNTER — LAB VISIT (OUTPATIENT)
Dept: LAB | Facility: HOSPITAL | Age: 52
End: 2022-12-06
Attending: INTERNAL MEDICINE
Payer: MEDICAID

## 2022-12-06 DIAGNOSIS — M86.172 ACUTE OSTEOMYELITIS OF LEFT ANKLE OR FOOT: Primary | ICD-10-CM

## 2022-12-06 LAB
ALBUMIN SERPL BCP-MCNC: 2.7 G/DL (ref 3.5–5.2)
ALP SERPL-CCNC: 93 U/L (ref 55–135)
ALT SERPL W/O P-5'-P-CCNC: 8 U/L (ref 10–44)
ANION GAP SERPL CALC-SCNC: 11 MMOL/L (ref 8–16)
AST SERPL-CCNC: 17 U/L (ref 10–40)
BASOPHILS # BLD AUTO: 0.09 K/UL (ref 0–0.2)
BASOPHILS NFR BLD: 1.3 % (ref 0–1.9)
BILIRUB SERPL-MCNC: 0.6 MG/DL (ref 0.1–1)
BUN SERPL-MCNC: 17 MG/DL (ref 6–20)
CALCIUM SERPL-MCNC: 8.4 MG/DL (ref 8.7–10.5)
CHLORIDE SERPL-SCNC: 97 MMOL/L (ref 95–110)
CO2 SERPL-SCNC: 24 MMOL/L (ref 23–29)
CREAT SERPL-MCNC: 2.1 MG/DL (ref 0.5–1.4)
CRP SERPL-MCNC: 31 MG/L (ref 0–8.2)
DIFFERENTIAL METHOD: ABNORMAL
EOSINOPHIL # BLD AUTO: 0.4 K/UL (ref 0–0.5)
EOSINOPHIL NFR BLD: 5.3 % (ref 0–8)
ERYTHROCYTE [DISTWIDTH] IN BLOOD BY AUTOMATED COUNT: 14.2 % (ref 11.5–14.5)
ERYTHROCYTE [SEDIMENTATION RATE] IN BLOOD BY WESTERGREN METHOD: 130 MM/HR (ref 0–10)
EST. GFR  (NO RACE VARIABLE): 37 ML/MIN/1.73 M^2
GLUCOSE SERPL-MCNC: 176 MG/DL (ref 70–110)
HCT VFR BLD AUTO: 27.3 % (ref 40–54)
HGB BLD-MCNC: 8.7 G/DL (ref 14–18)
IMM GRANULOCYTES # BLD AUTO: 0.01 K/UL (ref 0–0.04)
IMM GRANULOCYTES NFR BLD AUTO: 0.1 % (ref 0–0.5)
LYMPHOCYTES # BLD AUTO: 2.1 K/UL (ref 1–4.8)
LYMPHOCYTES NFR BLD: 31.3 % (ref 18–48)
MCH RBC QN AUTO: 32.3 PG (ref 27–31)
MCHC RBC AUTO-ENTMCNC: 31.9 G/DL (ref 32–36)
MCV RBC AUTO: 102 FL (ref 82–98)
MONOCYTES # BLD AUTO: 0.7 K/UL (ref 0.3–1)
MONOCYTES NFR BLD: 9.7 % (ref 4–15)
NEUTROPHILS # BLD AUTO: 3.6 K/UL (ref 1.8–7.7)
NEUTROPHILS NFR BLD: 52.3 % (ref 38–73)
NRBC BLD-RTO: 0 /100 WBC
PLATELET # BLD AUTO: 294 K/UL (ref 150–450)
PMV BLD AUTO: 9.5 FL (ref 9.2–12.9)
POTASSIUM SERPL-SCNC: 4.3 MMOL/L (ref 3.5–5.1)
PROT SERPL-MCNC: 7.8 G/DL (ref 6–8.4)
RBC # BLD AUTO: 2.69 M/UL (ref 4.6–6.2)
SODIUM SERPL-SCNC: 132 MMOL/L (ref 136–145)
WBC # BLD AUTO: 6.83 K/UL (ref 3.9–12.7)

## 2022-12-06 PROCEDURE — 86140 C-REACTIVE PROTEIN: CPT | Performed by: INTERNAL MEDICINE

## 2022-12-06 PROCEDURE — 85651 RBC SED RATE NONAUTOMATED: CPT | Performed by: INTERNAL MEDICINE

## 2022-12-06 PROCEDURE — 36415 COLL VENOUS BLD VENIPUNCTURE: CPT | Performed by: INTERNAL MEDICINE

## 2022-12-06 PROCEDURE — 85025 COMPLETE CBC W/AUTO DIFF WBC: CPT | Performed by: INTERNAL MEDICINE

## 2022-12-06 PROCEDURE — 80053 COMPREHEN METABOLIC PANEL: CPT | Performed by: INTERNAL MEDICINE

## 2022-12-07 ENCOUNTER — TELEPHONE (OUTPATIENT)
Dept: PODIATRY | Facility: CLINIC | Age: 52
End: 2022-12-07
Payer: MEDICAID

## 2022-12-07 ENCOUNTER — NURSE TRIAGE (OUTPATIENT)
Dept: ADMINISTRATIVE | Facility: CLINIC | Age: 52
End: 2022-12-07
Payer: MEDICAID

## 2022-12-07 LAB — FUNGUS SPEC CULT: NORMAL

## 2022-12-07 NOTE — TELEPHONE ENCOUNTER
GENEM informing patient  Edwin is associated with [facility] and not HOME HEALTH. His information was sent to [facility] for Wound care and will need to see us soon for wound vac change. Requested a call back.

## 2022-12-07 NOTE — TELEPHONE ENCOUNTER
Patient states that he had a home visit with Edwin Plascencia NP today. Patient received a voicemail from Edwin stating that the appointment was scheduled for tomorrow. Patient attempted to contact Edwin but was unsuccessful. Patient was advised to contact [facility]. Patient was disconnected. Attempted to contact the patient back but he did not answer.       Reason for Disposition   General information question, no triage required and triager able to answer question    Protocols used: Information Only Call - No Triage-A-

## 2022-12-08 ENCOUNTER — OFFICE VISIT (OUTPATIENT)
Dept: PODIATRY | Facility: CLINIC | Age: 52
End: 2022-12-08
Payer: MEDICAID

## 2022-12-08 VITALS — BODY MASS INDEX: 37.22 KG/M2 | WEIGHT: 299.38 LBS | HEIGHT: 75 IN

## 2022-12-08 DIAGNOSIS — E11.649 UNCONTROLLED TYPE 2 DIABETES MELLITUS WITH HYPOGLYCEMIA WITHOUT COMA: ICD-10-CM

## 2022-12-08 DIAGNOSIS — L97.523 FOOT ULCER, LEFT, WITH NECROSIS OF MUSCLE: ICD-10-CM

## 2022-12-08 DIAGNOSIS — E11.40 POORLY CONTROLLED TYPE 2 DIABETES MELLITUS WITH NEUROPATHY: Primary | ICD-10-CM

## 2022-12-08 DIAGNOSIS — E11.65 POORLY CONTROLLED TYPE 2 DIABETES MELLITUS WITH NEUROPATHY: Primary | ICD-10-CM

## 2022-12-08 DIAGNOSIS — Z86.19: ICD-10-CM

## 2022-12-08 PROCEDURE — 99213 OFFICE O/P EST LOW 20 MIN: CPT | Mod: PBBFAC | Performed by: PODIATRIST

## 2022-12-08 PROCEDURE — 99999 PR PBB SHADOW E&M-EST. PATIENT-LVL III: CPT | Mod: PBBFAC,,, | Performed by: PODIATRIST

## 2022-12-08 PROCEDURE — 97605 PR NEG PRESS WOUND THERAPY (NPWT) W/NON-DISPOSABLE WOUND VAC DEVICE (DME), <=50 CM: ICD-10-PCS | Mod: S$PBB,,, | Performed by: PODIATRIST

## 2022-12-08 PROCEDURE — 3008F PR BODY MASS INDEX (BMI) DOCUMENTED: ICD-10-PCS | Mod: CPTII,,, | Performed by: PODIATRIST

## 2022-12-08 PROCEDURE — 1160F RVW MEDS BY RX/DR IN RCRD: CPT | Mod: CPTII,,, | Performed by: PODIATRIST

## 2022-12-08 PROCEDURE — 97605 NEG PRS WND THER DME<=50SQCM: CPT | Mod: PBBFAC | Performed by: PODIATRIST

## 2022-12-08 PROCEDURE — 1159F MED LIST DOCD IN RCRD: CPT | Mod: CPTII,,, | Performed by: PODIATRIST

## 2022-12-08 PROCEDURE — 97605 NEG PRS WND THER DME<=50SQCM: CPT | Mod: S$PBB,,, | Performed by: PODIATRIST

## 2022-12-08 PROCEDURE — 3008F BODY MASS INDEX DOCD: CPT | Mod: CPTII,,, | Performed by: PODIATRIST

## 2022-12-08 PROCEDURE — 1159F PR MEDICATION LIST DOCUMENTED IN MEDICAL RECORD: ICD-10-PCS | Mod: CPTII,,, | Performed by: PODIATRIST

## 2022-12-08 PROCEDURE — 1160F PR REVIEW ALL MEDS BY PRESCRIBER/CLIN PHARMACIST DOCUMENTED: ICD-10-PCS | Mod: CPTII,,, | Performed by: PODIATRIST

## 2022-12-08 PROCEDURE — 99499 UNLISTED E&M SERVICE: CPT | Mod: S$PBB,,, | Performed by: PODIATRIST

## 2022-12-08 PROCEDURE — 99499 NO LOS: ICD-10-PCS | Mod: S$PBB,,, | Performed by: PODIATRIST

## 2022-12-08 PROCEDURE — 99999 PR PBB SHADOW E&M-EST. PATIENT-LVL III: ICD-10-PCS | Mod: PBBFAC,,, | Performed by: PODIATRIST

## 2022-12-08 NOTE — PROGRESS NOTES
"    Subjective:       Patient ID: Joseph Paris Jr. is a 52 y.o. male.    Chief Complaint: Follow-up (Wound vac change)    HPI: Joseph Paris Jr. presents to the office today status post incision drainage of left foot gas gangrene.  He was recently discharged from the hospital with a wound VAC present.  Wound VAC is functioning at 120 mmHg continuous suction.  He does have a scheduled appointment with [facility]  next week.  States 0/10 pain.  Currently utilizing a knee scooter for mobility.    Review of patient's allergies indicates:   Allergen Reactions    Lisinopril Swelling    Penicillins Other (See Comments)     Pt unsure of reaction, stating "I just know I'm allergic"       Past Medical History:   Diagnosis Date    Coronary artery disease     Diabetes mellitus     Esophageal cancer     Gas gangrene of foot 11/13/2022    Hypertension        Family History   Problem Relation Age of Onset    Heart disease Mother     Stroke Mother     Cancer Father     Diabetes Sister     Diabetes Brother        Social History     Socioeconomic History    Marital status: Single   Tobacco Use    Smoking status: Former    Smokeless tobacco: Never   Substance and Sexual Activity    Alcohol use: Yes    Drug use: No     Social Determinants of Health     Financial Resource Strain: Low Risk     Difficulty of Paying Living Expenses: Not hard at all   Food Insecurity: No Food Insecurity    Worried About Running Out of Food in the Last Year: Never true    Ran Out of Food in the Last Year: Never true   Transportation Needs: No Transportation Needs    Lack of Transportation (Medical): No    Lack of Transportation (Non-Medical): No   Physical Activity: Inactive    Days of Exercise per Week: 0 days    Minutes of Exercise per Session: 0 min   Stress: Stress Concern Present    Feeling of Stress : Very much   Social Connections: Socially Isolated    Frequency of Communication with Friends and Family: More than three times a week    " "Frequency of Social Gatherings with Friends and Family: More than three times a week    Attends [employer] Services: Never    Active Member of Clubs or Organizations: No    Attends Club or Organization Meetings: Never    Marital Status:    Housing Stability: Low Risk     Unable to Pay for Housing in the Last Year: No    Number of Places Lived in the Last Year: 1    Unstable Housing in the Last Year: No       Past Surgical History:   Procedure Laterality Date    ANGIOGRAPHY OF LOWER EXTREMITY N/A 11/15/2022    Procedure: ANGIOGRAM, LOWER EXTREMITY/left leg poss pta/stent;  Surgeon: Cameron Berman MD;  Location: [facility] CATH LAB;  Service: Peripheral Vascular;  Laterality: N/A;  possible 130 start time    APPLICATION OF WOUND VACUUM-ASSISTED CLOSURE DEVICE Left 11/17/2022    Procedure: APPLICATION, WOUND VAC;  Surgeon: Sierra Augustine DPM;  Location: [facility] OR;  Service: Podiatry;  Laterality: Left;    ATHERECTOMY  11/15/2022    Procedure: Atherectomy;  Surgeon: Cameron Berman MD;  Location: [facility] CATH LAB;  Service: Peripheral Vascular;;    CORONARY ARTERY BYPASS GRAFT      FEMORAL BYPASS      INCISION AND DRAINAGE FOOT Left 11/13/2022    Procedure: INCISION AND DRAINAGE, FOOT;  Surgeon: Sierra Augustine DPM;  Location: [facility] OR;  Service: Podiatry;  Laterality: Left;    INCISION AND DRAINAGE FOOT Left 11/17/2022    Procedure: INCISION AND DRAINAGE, FOOT;  Surgeon: Sierra Augustine DPM;  Location: [facility] OR;  Service: Podiatry;  Laterality: Left;    PERCUTANEOUS TRANSLUMINAL ANGIOPLASTY N/A 11/15/2022    Procedure: PTA (ANGIOPLASTY, PERCUTANEOUS, TRANSLUMINAL);  Surgeon: Camreon Berman MD;  Location: [facility] CATH LAB;  Service: Peripheral Vascular;  Laterality: N/A;       Review of Systems       Objective:   Ht 6' 3" (1.905 m)   Wt 135.8 kg (299 lb 6.2 oz)   BMI 37.42 kg/m²     X-Ray Chest 1 View  Narrative: EXAMINATION:  XR CHEST 1 VIEW    CLINICAL HISTORY:  PICC " Placement;    COMPARISON:  None    FINDINGS:  Lungs are underinflated without consolidation.  No pleural effusion or shift of the mediastinum right PICC line appear in good position distal tip expected SVC.  No convincing pneumothorax.  Impression: Satisfactory post line placement radiograph    Electronically signed by: Ashley Rojo  Date:    11/18/2022  Time:    22:10       Physical Exam   LOWER EXTREMITY PHYSICAL EXAMINATION    Vascular:    The left dorsalis pedis pulse 2/4 and posterior tibial pulse on the left is 1/4.  Capillary refill is intact.  Pedal hair growth decreased.     Neurological:  Protective sensation is not intact to the right left foot.  Vibratory sensation is diminished.  Proprioception is intact.  Sharp/dull is reduced.    Musculoskeletal: Manual Muscle Testing is 5/5 with dorsiflexion, plantar flexion, abduction, and adduction.       Dermatological:  Skin is supple and moist.  Slight maceration noted to the medial aspect of the 1st metatarsophalangeal joint.  Large ulceration present to the dorsal aspect of the foot which persist from the proximal great toe throughout the dorsal aspect of the midfoot.  Ulceration measures 10.5 cm x 5.45 cm.  Extensor hallucis tendon is noted but appears to be filling in quite well.  There is no signs of abscess.     Imaging:         Results for orders placed during the hospital encounter of 11/12/22    X-Ray Foot Complete Left    Narrative  EXAMINATION:  XR FOOT COMPLETE 3 VIEW LEFT    CLINICAL HISTORY:  .  Local infection of the skin and subcutaneous tissue, unspecified    TECHNIQUE:  AP, lateral and oblique views of the left foot were performed.    COMPARISON:  None    FINDINGS:  Soft tissue emphysema over the 1st digit and dorsum of the foot strongly concerning for infection.  Irregular margin of the 1st metatarsal concerning for osteomyelitis.  Correlation with MRI with contrast would be advised if clinically feasible and the patient is  compatible.    Impression  Soft tissue emphysema and irregular margin of the distal aspect of the 1st metatarsal strongly concerning for acute osteomyelitis.    This report was flagged in Epic as abnormal.      Electronically signed by: Renzo Christopher  Date:    11/12/2022  Time:    19:55       No results found for this or any previous visit.          Assessment:     1. Poorly controlled type 2 diabetes mellitus with neuropathy    2. Uncontrolled type 2 diabetes mellitus with hypoglycemia without coma    3. Foot ulcer, left, with necrosis of muscle    4. History of gas gangrene        Plan:     Poorly controlled type 2 diabetes mellitus with neuropathy    Uncontrolled type 2 diabetes mellitus with hypoglycemia without coma    Foot ulcer, left, with necrosis of muscle    History of gas gangrene    Thorough discussion is had with the patient today, concerning the diagnosis, its etiology, and the treatment algorithm at present.    Wound VAC was replaced to the dorsal aspect of the left foot.  Protective layer was applied circumferentially around the wound.  White foam applied dorsally over the extensor hallucis tendon.  Black foam to cover.  The VAC was held in place and tested for seal check.  All was functioning well.  VAC will remain in place at 120 mmHg with continuous suction.  We discussed the importance of removing the VAC if there is signs of seal problems or the VAC does shut off for any reason at all.  Recommend he applies a dry dressing thereafter.      Will continue to follow with Wound care center as he would like to salvage the foot.    Continue IV antibiotics per Infectious Disease      Future Appointments   Date Time Provider Department Center   12/14/2022  8:00 AM Edwin Plascencia NP [facility] C3[facility]   1/9/2023 11:00 AM Compa Leal MD, [facility]

## 2022-12-09 ENCOUNTER — PATIENT MESSAGE (OUTPATIENT)
Dept: PODIATRY | Facility: CLINIC | Age: 52
End: 2022-12-09
Payer: MEDICAID

## 2022-12-13 ENCOUNTER — LAB VISIT (OUTPATIENT)
Dept: LAB | Facility: HOSPITAL | Age: 52
End: 2022-12-13
Attending: INTERNAL MEDICINE
Payer: MEDICAID

## 2022-12-13 DIAGNOSIS — M86.172 ACUTE OSTEOMYELITIS OF LEFT ANKLE OR FOOT: Primary | ICD-10-CM

## 2022-12-13 LAB
ALBUMIN SERPL BCP-MCNC: 2.9 G/DL (ref 3.5–5.2)
ALP SERPL-CCNC: 92 U/L (ref 55–135)
ALT SERPL W/O P-5'-P-CCNC: 42 U/L (ref 10–44)
ANION GAP SERPL CALC-SCNC: 9 MMOL/L (ref 8–16)
AST SERPL-CCNC: 151 U/L (ref 10–40)
BASOPHILS # BLD AUTO: 0.05 K/UL (ref 0–0.2)
BASOPHILS NFR BLD: 0.9 % (ref 0–1.9)
BILIRUB SERPL-MCNC: 0.3 MG/DL (ref 0.1–1)
BUN SERPL-MCNC: 19 MG/DL (ref 6–20)
CALCIUM SERPL-MCNC: 8.3 MG/DL (ref 8.7–10.5)
CHLORIDE SERPL-SCNC: 99 MMOL/L (ref 95–110)
CK SERPL-CCNC: 4446 U/L (ref 20–200)
CO2 SERPL-SCNC: 25 MMOL/L (ref 23–29)
CREAT SERPL-MCNC: 1.9 MG/DL (ref 0.5–1.4)
CRP SERPL-MCNC: 6.8 MG/L (ref 0–8.2)
DIFFERENTIAL METHOD: ABNORMAL
EOSINOPHIL # BLD AUTO: 0.3 K/UL (ref 0–0.5)
EOSINOPHIL NFR BLD: 5.3 % (ref 0–8)
ERYTHROCYTE [DISTWIDTH] IN BLOOD BY AUTOMATED COUNT: 13.4 % (ref 11.5–14.5)
ERYTHROCYTE [SEDIMENTATION RATE] IN BLOOD BY WESTERGREN METHOD: 110 MM/HR (ref 0–10)
EST. GFR  (NO RACE VARIABLE): 41 ML/MIN/1.73 M^2
GLUCOSE SERPL-MCNC: 285 MG/DL (ref 70–110)
HCT VFR BLD AUTO: 29.8 % (ref 40–54)
HGB BLD-MCNC: 9.5 G/DL (ref 14–18)
IMM GRANULOCYTES # BLD AUTO: 0.01 K/UL (ref 0–0.04)
IMM GRANULOCYTES NFR BLD AUTO: 0.2 % (ref 0–0.5)
LYMPHOCYTES # BLD AUTO: 1.4 K/UL (ref 1–4.8)
LYMPHOCYTES NFR BLD: 24.8 % (ref 18–48)
MCH RBC QN AUTO: 32.1 PG (ref 27–31)
MCHC RBC AUTO-ENTMCNC: 31.9 G/DL (ref 32–36)
MCV RBC AUTO: 101 FL (ref 82–98)
MONOCYTES # BLD AUTO: 0.3 K/UL (ref 0.3–1)
MONOCYTES NFR BLD: 5.5 % (ref 4–15)
NEUTROPHILS # BLD AUTO: 3.6 K/UL (ref 1.8–7.7)
NEUTROPHILS NFR BLD: 63.3 % (ref 38–73)
NRBC BLD-RTO: 0 /100 WBC
PLATELET # BLD AUTO: 297 K/UL (ref 150–450)
PMV BLD AUTO: 9.5 FL (ref 9.2–12.9)
POTASSIUM SERPL-SCNC: 4.4 MMOL/L (ref 3.5–5.1)
PROT SERPL-MCNC: 8 G/DL (ref 6–8.4)
RBC # BLD AUTO: 2.96 M/UL (ref 4.6–6.2)
SODIUM SERPL-SCNC: 133 MMOL/L (ref 136–145)
WBC # BLD AUTO: 5.64 K/UL (ref 3.9–12.7)

## 2022-12-13 PROCEDURE — 85651 RBC SED RATE NONAUTOMATED: CPT | Performed by: INTERNAL MEDICINE

## 2022-12-13 PROCEDURE — 36415 COLL VENOUS BLD VENIPUNCTURE: CPT | Performed by: INTERNAL MEDICINE

## 2022-12-13 PROCEDURE — 82550 ASSAY OF CK (CPK): CPT | Performed by: INTERNAL MEDICINE

## 2022-12-13 PROCEDURE — 86140 C-REACTIVE PROTEIN: CPT | Performed by: INTERNAL MEDICINE

## 2022-12-13 PROCEDURE — 80053 COMPREHEN METABOLIC PANEL: CPT | Performed by: INTERNAL MEDICINE

## 2022-12-13 PROCEDURE — 85025 COMPLETE CBC W/AUTO DIFF WBC: CPT | Performed by: INTERNAL MEDICINE

## 2022-12-15 ENCOUNTER — CARE AT HOME (OUTPATIENT)
Dept: HOME HEALTH SERVICES | Facility: CLINIC | Age: 52
End: 2022-12-15
Payer: MEDICAID

## 2022-12-15 VITALS
HEART RATE: 85 BPM | DIASTOLIC BLOOD PRESSURE: 76 MMHG | RESPIRATION RATE: 18 BRPM | SYSTOLIC BLOOD PRESSURE: 138 MMHG | OXYGEN SATURATION: 99 %

## 2022-12-15 DIAGNOSIS — R78.81 BACTEREMIA DUE TO GROUP B STREPTOCOCCUS: ICD-10-CM

## 2022-12-15 DIAGNOSIS — L89.619 PRESSURE INJURY OF SKIN OF RIGHT HEEL, UNSPECIFIED INJURY STAGE: ICD-10-CM

## 2022-12-15 DIAGNOSIS — M86.172 ACUTE OSTEOMYELITIS OF METATARSAL BONE OF LEFT FOOT: ICD-10-CM

## 2022-12-15 DIAGNOSIS — B95.1 BACTEREMIA DUE TO GROUP B STREPTOCOCCUS: ICD-10-CM

## 2022-12-15 PROCEDURE — 99344 PR HOME VISIT,NEW PATIENT,LEVEL IV: ICD-10-PCS | Mod: ,,,

## 2022-12-15 PROCEDURE — 1111F PR DISCHARGE MEDS RECONCILED W/ CURRENT OUTPATIENT MED LIST: ICD-10-PCS | Mod: CPTII,,,

## 2022-12-15 PROCEDURE — 1111F DSCHRG MED/CURRENT MED MERGE: CPT | Mod: CPTII,,,

## 2022-12-15 PROCEDURE — 99344 HOME/RES VST NEW MOD MDM 60: CPT | Mod: ,,,

## 2022-12-15 NOTE — ASSESSMENT & PLAN NOTE
Recent hospital encounter with bacteremia  ID following  Was on Daptomycin but was discontinued  On Rocephin x 6 weeks  Wound vac in place  Wound dressing and vac changes at [facility]  Monitor

## 2022-12-15 NOTE — ASSESSMENT & PLAN NOTE
[facility]  Reports going twice a week.  Went 12/14 and next appointment 12/15  Monitor

## 2022-12-15 NOTE — PROGRESS NOTES
[facility]  Medical Home Visit    Visit Date: 12/15/2022  Encounter Provider: Edwin Plascencia  PCP:  Thong Hidalgo Jr, MD    Subjective:      Patient ID: Joseph Paris Jr. is a 52 y.o. male.    Consult Requested By:  No ref. provider found  Reason for Consult:  Hospital follow up    Joseph is being seen at home due to being seen at home due to physical debility that presents a taxing effort to leave the home, to mitigate high risk of hospital readmission and/or due to the limited availability of reliable or safe options for transportation to the point of access to the provider. Prior to treatment on this visit the chart was reviewed and patient verbal consent was obtained.    Chief Complaint: Follow-up      11/12-11/29 hospital encounter with bacteremia. The patient had been treating the wounds to bilateral feet himself.  He went to the hospital for evaluation of bilateral feet swelling.  During his stay he had an  excisional debridement of wound to the dorsal aspect of the left foot with application of negative pressure wound VAC therapy.  Wound VAC is functioning at 120 mmHg continuous suction.  He had his dressings and vac changed yesterday and has an appointment again tomorrow on 12/16.  He reports he will start going twice a week.  Patient has no complaints at this time.  He reports compliance with all medications.  Questions elicited. Time allowed for questions, all issues addressed. Contact info given for any concerns or changes.         Review of Systems   Constitutional: Negative.    HENT: Negative.     Eyes: Negative.    Respiratory: Negative.     Cardiovascular: Negative.    Gastrointestinal: Negative.    Endocrine: Negative.    Genitourinary: Negative.    Musculoskeletal:  Positive for gait problem.   Skin:  Positive for wound (s/p left foot debridement, wound vac in place, right foot wrapped with kerlix).   Allergic/Immunologic: Negative.    Hematological: Negative.    Psychiatric/Behavioral:  Negative.       Assessments:  Environmental: Patient lives in a single story home. Adequate lighting and comfortable temperature.   Functional Status: Independent with ADL's.  Safety: Fall precautions.   Nutritional: Adequate food in the home.   Home Health/DME/Supplies: No home health. DME: Wound vac    Objective:   Physical Exam  Constitutional:       Appearance: Normal appearance. He is normal weight.   HENT:      Nose: Nose normal.      Mouth/Throat:      Mouth: Mucous membranes are moist.      Pharynx: Oropharynx is clear.   Eyes:      Pupils: Pupils are equal, round, and reactive to light.   Cardiovascular:      Rate and Rhythm: Normal rate and regular rhythm.      Pulses: Normal pulses.      Heart sounds: Normal heart sounds.   Pulmonary:      Effort: Pulmonary effort is normal.      Breath sounds: Normal breath sounds.   Abdominal:      General: Bowel sounds are normal.      Palpations: Abdomen is soft.   Musculoskeletal:         General: Normal range of motion.      Cervical back: Normal range of motion.   Skin:     General: Skin is warm and dry.      Comments: Both feet with dressings in place.  Left foot with wound vac   Neurological:      General: No focal deficit present.      Mental Status: He is alert and oriented to person, place, and time. Mental status is at baseline.      Gait: Gait abnormal.   Psychiatric:         Mood and Affect: Mood normal.         Behavior: Behavior normal.         Thought Content: Thought content normal.         Judgment: Judgment normal.       Vitals:    12/15/22 1605   BP: 138/76   Pulse: 85   Resp: 18   SpO2: 99%     There is no height or weight on file to calculate BMI.    Assessment:     1. Bacteremia due to group B Streptococcus    2. Acute osteomyelitis of metatarsal bone of left foot    3. Pressure injury of skin of right heel, unspecified injury stage        Plan:     Ethical / Legal: Advance Care Planning   Surrogate decision maker:  Grace Arango, Relationship:  Mother  Code Status:  Full  [place]:  None  Other advance directive:  None, Capacity to make medical decisions:  Yes, Conflict No       Problem List Items Addressed This Visit          ID    Acute osteomyelitis of metatarsal bone of left foot    Current Assessment & Plan     S/p I and d with vac placement by podiatry  ID following  On Rocephin x 6 weeks  Monitor         Bacteremia due to group B Streptococcus    Current Assessment & Plan     Recent hospital encounter with bacteremia  ID following  Was on Daptomycin but was discontinued  On Rocephin x 6 weeks  Wound vac in place  Wound dressing and vac changes at [facility]  Monitor            Orthopedic    Decubitus ulcer of right heel    Current Assessment & Plan     [facility] for wound care  Reports going twice a week.  Went 12/14 and next appointment 12/15  Monitor           - Continue all medications, treatments and therapies as ordered.   - Follow all instructions, recommendations as discussed.  - Maintain Safety Precautions at all times.  - Attend all medical appointments as scheduled.  - For worsening symptoms: call Primary Care Physician or Nurse Practitioner.  - For emergencies, call 911 or immediately report to the nearest emergency room.  - Limit Risks of environmental exposure to coronavirus/COVID-19 as discussed including: social distancing, hand hygiene, and limiting departures from the home for necessities only.     Were controlled substances prescribed?  No    Follow Up Appointments:   Future Appointments   Date Time Provider Department Center   1/9/2023 11:00 AM [facility]       Signature: Edwin Plascencia NP

## 2022-12-20 ENCOUNTER — LAB VISIT (OUTPATIENT)
Dept: LAB | Facility: HOSPITAL | Age: 52
End: 2022-12-20
Attending: INTERNAL MEDICINE
Payer: MEDICAID

## 2022-12-20 DIAGNOSIS — M86.172 ACUTE OSTEOMYELITIS OF LEFT ANKLE OR FOOT: Primary | ICD-10-CM

## 2022-12-20 LAB
ALBUMIN SERPL BCP-MCNC: 3 G/DL (ref 3.5–5.2)
ALP SERPL-CCNC: 97 U/L (ref 55–135)
ALT SERPL W/O P-5'-P-CCNC: 27 U/L (ref 10–44)
ANION GAP SERPL CALC-SCNC: 9 MMOL/L (ref 8–16)
AST SERPL-CCNC: 24 U/L (ref 10–40)
BASOPHILS # BLD AUTO: 0.07 K/UL (ref 0–0.2)
BASOPHILS NFR BLD: 1 % (ref 0–1.9)
BILIRUB SERPL-MCNC: 0.4 MG/DL (ref 0.1–1)
BUN SERPL-MCNC: 16 MG/DL (ref 6–20)
CALCIUM SERPL-MCNC: 8.9 MG/DL (ref 8.7–10.5)
CHLORIDE SERPL-SCNC: 100 MMOL/L (ref 95–110)
CK SERPL-CCNC: 213 U/L (ref 20–200)
CO2 SERPL-SCNC: 28 MMOL/L (ref 23–29)
CREAT SERPL-MCNC: 1.8 MG/DL (ref 0.5–1.4)
CRP SERPL-MCNC: 6 MG/L (ref 0–8.2)
DIFFERENTIAL METHOD: ABNORMAL
EOSINOPHIL # BLD AUTO: 0.3 K/UL (ref 0–0.5)
EOSINOPHIL NFR BLD: 3.7 % (ref 0–8)
ERYTHROCYTE [DISTWIDTH] IN BLOOD BY AUTOMATED COUNT: 12.8 % (ref 11.5–14.5)
ERYTHROCYTE [SEDIMENTATION RATE] IN BLOOD BY WESTERGREN METHOD: 67 MM/HR (ref 0–10)
EST. GFR  (NO RACE VARIABLE): 44 ML/MIN/1.73 M^2
GLUCOSE SERPL-MCNC: 117 MG/DL (ref 70–110)
HCT VFR BLD AUTO: 31.7 % (ref 40–54)
HGB BLD-MCNC: 10.3 G/DL (ref 14–18)
IMM GRANULOCYTES # BLD AUTO: 0.01 K/UL (ref 0–0.04)
IMM GRANULOCYTES NFR BLD AUTO: 0.1 % (ref 0–0.5)
LYMPHOCYTES # BLD AUTO: 2.7 K/UL (ref 1–4.8)
LYMPHOCYTES NFR BLD: 37 % (ref 18–48)
MCH RBC QN AUTO: 32.4 PG (ref 27–31)
MCHC RBC AUTO-ENTMCNC: 32.5 G/DL (ref 32–36)
MCV RBC AUTO: 100 FL (ref 82–98)
MONOCYTES # BLD AUTO: 0.5 K/UL (ref 0.3–1)
MONOCYTES NFR BLD: 7.2 % (ref 4–15)
NEUTROPHILS # BLD AUTO: 3.7 K/UL (ref 1.8–7.7)
NEUTROPHILS NFR BLD: 51 % (ref 38–73)
NRBC BLD-RTO: 0 /100 WBC
PLATELET # BLD AUTO: 324 K/UL (ref 150–450)
PMV BLD AUTO: 9.4 FL (ref 9.2–12.9)
POTASSIUM SERPL-SCNC: 3.8 MMOL/L (ref 3.5–5.1)
PROT SERPL-MCNC: 8 G/DL (ref 6–8.4)
RBC # BLD AUTO: 3.18 M/UL (ref 4.6–6.2)
SODIUM SERPL-SCNC: 137 MMOL/L (ref 136–145)
WBC # BLD AUTO: 7.24 K/UL (ref 3.9–12.7)

## 2022-12-20 PROCEDURE — 86140 C-REACTIVE PROTEIN: CPT | Performed by: INTERNAL MEDICINE

## 2022-12-20 PROCEDURE — 36415 COLL VENOUS BLD VENIPUNCTURE: CPT | Performed by: INTERNAL MEDICINE

## 2022-12-20 PROCEDURE — 85025 COMPLETE CBC W/AUTO DIFF WBC: CPT | Performed by: INTERNAL MEDICINE

## 2022-12-20 PROCEDURE — 82550 ASSAY OF CK (CPK): CPT | Performed by: INTERNAL MEDICINE

## 2022-12-20 PROCEDURE — 85651 RBC SED RATE NONAUTOMATED: CPT | Performed by: INTERNAL MEDICINE

## 2022-12-20 PROCEDURE — 80053 COMPREHEN METABOLIC PANEL: CPT | Performed by: INTERNAL MEDICINE

## 2023-01-01 LAB
ACID FAST MOD KINY STN SPEC: NORMAL
MYCOBACTERIUM SPEC QL CULT: NORMAL

## 2023-01-09 ENCOUNTER — OFFICE VISIT (OUTPATIENT)
Dept: INFECTIOUS DISEASES | Facility: CLINIC | Age: 53
End: 2023-01-09
Payer: MEDICAID

## 2023-01-09 VITALS
BODY MASS INDEX: 37.22 KG/M2 | SYSTOLIC BLOOD PRESSURE: 122 MMHG | DIASTOLIC BLOOD PRESSURE: 79 MMHG | WEIGHT: 299.38 LBS | HEART RATE: 83 BPM | HEIGHT: 75 IN

## 2023-01-09 DIAGNOSIS — R78.81 BACTEREMIA DUE TO GROUP B STREPTOCOCCUS: ICD-10-CM

## 2023-01-09 DIAGNOSIS — B95.1 BACTEREMIA DUE TO GROUP B STREPTOCOCCUS: ICD-10-CM

## 2023-01-09 DIAGNOSIS — M86.172 ACUTE OSTEOMYELITIS OF METATARSAL BONE OF LEFT FOOT: ICD-10-CM

## 2023-01-09 PROCEDURE — 3078F DIAST BP <80 MM HG: CPT | Mod: CPTII,,, | Performed by: INTERNAL MEDICINE

## 2023-01-09 PROCEDURE — 99213 OFFICE O/P EST LOW 20 MIN: CPT | Mod: PBBFAC | Performed by: INTERNAL MEDICINE

## 2023-01-09 PROCEDURE — 3008F BODY MASS INDEX DOCD: CPT | Mod: CPTII,,, | Performed by: INTERNAL MEDICINE

## 2023-01-09 PROCEDURE — 3078F PR MOST RECENT DIASTOLIC BLOOD PRESSURE < 80 MM HG: ICD-10-PCS | Mod: CPTII,,, | Performed by: INTERNAL MEDICINE

## 2023-01-09 PROCEDURE — 99214 PR OFFICE/OUTPT VISIT, EST, LEVL IV, 30-39 MIN: ICD-10-PCS | Mod: S$PBB,,, | Performed by: INTERNAL MEDICINE

## 2023-01-09 PROCEDURE — 99214 OFFICE O/P EST MOD 30 MIN: CPT | Mod: S$PBB,,, | Performed by: INTERNAL MEDICINE

## 2023-01-09 PROCEDURE — 99999 PR PBB SHADOW E&M-EST. PATIENT-LVL III: CPT | Mod: PBBFAC,,, | Performed by: INTERNAL MEDICINE

## 2023-01-09 PROCEDURE — 1159F PR MEDICATION LIST DOCUMENTED IN MEDICAL RECORD: ICD-10-PCS | Mod: CPTII,,, | Performed by: INTERNAL MEDICINE

## 2023-01-09 PROCEDURE — 99999 PR PBB SHADOW E&M-EST. PATIENT-LVL III: ICD-10-PCS | Mod: PBBFAC,,, | Performed by: INTERNAL MEDICINE

## 2023-01-09 PROCEDURE — 1159F MED LIST DOCD IN RCRD: CPT | Mod: CPTII,,, | Performed by: INTERNAL MEDICINE

## 2023-01-09 PROCEDURE — 3074F PR MOST RECENT SYSTOLIC BLOOD PRESSURE < 130 MM HG: ICD-10-PCS | Mod: CPTII,,, | Performed by: INTERNAL MEDICINE

## 2023-01-09 PROCEDURE — 3008F PR BODY MASS INDEX (BMI) DOCUMENTED: ICD-10-PCS | Mod: CPTII,,, | Performed by: INTERNAL MEDICINE

## 2023-01-09 PROCEDURE — 3074F SYST BP LT 130 MM HG: CPT | Mod: CPTII,,, | Performed by: INTERNAL MEDICINE

## 2023-01-09 RX ORDER — INSULIN DEGLUDEC 100 U/ML
INJECTION, SOLUTION SUBCUTANEOUS
COMMUNITY
Start: 2023-01-03

## 2023-01-09 RX ORDER — METFORMIN HYDROCHLORIDE 750 MG/1
1500 TABLET, EXTENDED RELEASE ORAL
COMMUNITY
Start: 2022-12-01

## 2023-01-09 RX ORDER — SEMAGLUTIDE 1.34 MG/ML
INJECTION, SOLUTION SUBCUTANEOUS
COMMUNITY
Start: 2022-12-27 | End: 2024-03-29

## 2023-01-09 RX ORDER — DAPTOMYCIN 50 MG/ML
1000 INJECTION, POWDER, LYOPHILIZED, FOR SOLUTION INTRAVENOUS
COMMUNITY
Start: 2022-11-29

## 2023-01-09 RX ORDER — DAPAGLIFLOZIN 10 MG/1
TABLET, FILM COATED ORAL
COMMUNITY
Start: 2022-12-21

## 2023-01-09 RX ORDER — FLUOXETINE HYDROCHLORIDE 40 MG/1
1 CAPSULE ORAL EVERY MORNING
COMMUNITY
Start: 2022-12-01 | End: 2025-05-29

## 2023-01-09 RX ORDER — SILDENAFIL 100 MG/1
100 TABLET, FILM COATED ORAL DAILY PRN
COMMUNITY
Start: 2022-02-23

## 2023-01-09 RX ORDER — OXYCODONE HYDROCHLORIDE 5 MG/1
5 TABLET ORAL EVERY 8 HOURS PRN
COMMUNITY
Start: 2022-11-30

## 2023-01-09 NOTE — PROGRESS NOTES
"Subjective:       Patient ID: Joseph Paris Jr. is a 52 y.o. male.    Chief Complaint: HOSPITAL F/UP    HPI   52 y.o. male patient with a PMHx of HTN, DM, and CAD admitted with worsening lower extremity infection.  Labs and imaging test reviewed-  Blood culture- strep agalactiae 11/12/22  Arterial ultrasound-   Suggestion of mild stenosis within the left iliac artery. Suggestion of hemodynamically significant stenoses within the distal left SFA, distal popliteal artery, and tibioperoneal trunk.   Right lower extremity arterial system unremarkable.  MRI of the left foot-  Findings compatible with osteomyelitis of the 1st metatarsal and 1st proximal phalanx.    .  He was seen by podiatry and had I and d done-  Operative findings -"   Gas gangrene/ Necrotizing fasciitis to the left foot. Large abscess at the first MPJ dorsal foot progressing proximally to the anterior ankle and proximal-laterally to the dorsal proximal midfoot. Very little bleeding without tourniquet or exsanguination. "  Component      Latest Ref Rng & Units 11/14/2022 11/13/2022 11/12/2022   WBC      3.90 - 12.70 K/uL 16.14 (H) 22.30 (H) 22.99 (H)    1/9/22  The ID plan at that time in the hospital was to Plan B - IV Daptomycin  /Rocephin for 6 weeks   EOC 12./29/22  Component      Latest Ref Rng & Units 12/20/2022 12/13/2022 12/6/2022 11/12/2022   Sed Rate      0 - 10 mm/Hr 67 (H) 110 (H) 130 (H) 125 (H)     Component      Latest Ref Rng & Units 12/20/2022 12/13/2022 12/6/2022 11/12/2022   CRP      0.0 - 8.2 mg/L 6.0 6.8 31.0 (H) 471.2 (H)     He follows up with [place] wound care and HBO is planned    Review of Systems   Constitutional:  Negative for activity change, appetite change, chills, diaphoresis and fatigue.   HENT:  Negative for nasal congestion, dental problem, ear discharge, ear pain and facial swelling.    Eyes:  Negative for pain, discharge and itching.   Respiratory:  Negative for apnea, cough, chest tightness and shortness of breath.  "   Cardiovascular:  Negative for chest pain and leg swelling.   Gastrointestinal:  Negative for abdominal distention and abdominal pain.   Endocrine: Negative for cold intolerance, heat intolerance and polydipsia.   Genitourinary:  Negative for difficulty urinating, dysuria and enuresis.   Musculoskeletal:  Negative for arthralgias and back pain.   Integumentary:  Negative for color change and pallor.   Allergic/Immunologic: Negative for environmental allergies and food allergies.   Neurological:  Negative for dizziness, facial asymmetry, light-headedness and headaches.   Hematological:  Negative for adenopathy. Does not bruise/bleed easily.   Psychiatric/Behavioral:  Negative for agitation and behavioral problems.        Objective:      Physical Exam  Vitals and nursing note reviewed.   HENT:      Head: Normocephalic and atraumatic.   Eyes:      Pupils: Pupils are equal, round, and reactive to light.   Neck:      Thyroid: No thyromegaly.   Cardiovascular:      Rate and Rhythm: Normal rate and regular rhythm.   Pulmonary:      Effort: Pulmonary effort is normal. No respiratory distress.      Breath sounds: No wheezing.   Abdominal:      General: Bowel sounds are normal.      Palpations: Abdomen is soft.      Tenderness: There is no abdominal tenderness.   Musculoskeletal:      Cervical back: Normal range of motion and neck supple.   Skin:     Comments: See pic   Neurological:      Mental Status: He is oriented to person, place, and time.         Assessment:       1. Acute osteomyelitis of metatarsal bone of left foot        2. Bacteremia due to group B Streptococcus              Plan:     Problem List Items Addressed This Visit       Acute osteomyelitis of metatarsal bone of left foot     S/p-IV Daptomycin 700mg daily and IV Rocephin 2 gram daily   Follow wound care/ Possible HPO          Bacteremia due to group B Streptococcus     Treated          DM- follow PCP

## 2023-03-06 PROBLEM — N17.9 AKI (ACUTE KIDNEY INJURY): Status: RESOLVED | Noted: 2022-11-12 | Resolved: 2023-03-06

## 2024-09-23 ENCOUNTER — HOSPITAL ENCOUNTER (EMERGENCY)
Facility: HOSPITAL | Age: 54
Discharge: HOME | End: 2024-09-23
Attending: EMERGENCY MEDICINE
Payer: COMMERCIAL

## 2024-09-23 VITALS
WEIGHT: 255 LBS | HEART RATE: 72 BPM | TEMPERATURE: 98 F | DIASTOLIC BLOOD PRESSURE: 77 MMHG | SYSTOLIC BLOOD PRESSURE: 144 MMHG | OXYGEN SATURATION: 98 % | HEIGHT: 75 IN | BODY MASS INDEX: 31.71 KG/M2 | RESPIRATION RATE: 18 BRPM

## 2024-09-23 DIAGNOSIS — Z86.79 HISTORY OF PERIPHERAL VASCULAR DISEASE: ICD-10-CM

## 2024-09-23 DIAGNOSIS — M79.89 LEFT LEG SWELLING: Primary | ICD-10-CM

## 2024-09-23 DIAGNOSIS — Z89.412 HISTORY OF AMPUTATION OF LEFT GREAT TOE: ICD-10-CM

## 2024-09-23 PROCEDURE — 99284 EMERGENCY DEPT VISIT MOD MDM: CPT | Mod: 25

## 2024-09-23 NOTE — ED PROVIDER NOTES
"SCRIBE #1 NOTE: I, Jimmie Pierre, am scribing for, and in the presence of, Delia Preston MD. I have scribed the entire note.       History     Chief Complaint   Patient presents with    Leg Swelling     Reports persistent LLE leg swelling after great toe amputation approx 6 week ago. Denies fever     Review of patient's allergies indicates:   Allergen Reactions    Lisinopril Swelling    Penicillins Other (See Comments)     Pt unsure of reaction, stating "I just know I'm allergic"         History of Present Illness     HPI    9/23/2024, 12:20 PM  History obtained from the patient      History of Present Illness: Joseph Paris Jr. is a 53 y.o. male patient with a PMHx of DM, HTN, CAD, esophageal cancer, and gangrene of foot who presents to the Emergency Department for evaluation of left lower leg swelling which onset gradually since toe amputation approximately a month and a half ago with Dr. Anderson ([facility]) at [facility]. Pt states that he has been elevating his feet but the swelling goes unimproved.  Has already been seen by his podiatrist, states given compression stocking and Symptoms are constant and moderate in severity. No mitigating or exacerbating factors reported. No associated sxs. Patient denies any trauma, or injury, no CP, SOB, fever, N/V, and all other sxs at this time. No prior Tx. No further complaints or concerns at this time.       Arrival mode: Personal vehicle    PCP: No, Primary Doctor        Past Medical History:  Past Medical History:   Diagnosis Date    Coronary artery disease     Diabetes mellitus     Esophageal cancer     Gas gangrene of foot 11/13/2022    Hypertension        Past Surgical History:  Past Surgical History:   Procedure Laterality Date    ANGIOGRAPHY OF LOWER EXTREMITY N/A 11/15/2022    Procedure: ANGIOGRAM, LOWER EXTREMITY/left leg poss pta/stent;  Surgeon: Cameron Berman MD;  Location: [facility] CATH LAB;  Service: Peripheral Vascular;  Laterality: N/A;  possible 130 start time "    APPLICATION OF WOUND VACUUM-ASSISTED CLOSURE DEVICE Left 11/17/2022    Procedure: APPLICATION, WOUND VAC;  Surgeon: Sierra Augustine DPM;  Location: [facility] OR;  Service: Podiatry;  Laterality: Left;    ATHERECTOMY  11/15/2022    Procedure: Atherectomy;  Surgeon: Cameron Berman MD;  Location: [facility] CATH LAB;  Service: Peripheral Vascular;;    CORONARY ARTERY BYPASS GRAFT      FEMORAL BYPASS      INCISION AND DRAINAGE FOOT Left 11/13/2022    Procedure: INCISION AND DRAINAGE, FOOT;  Surgeon: Sierra Augustine DPM;  Location: [facility] OR;  Service: Podiatry;  Laterality: Left;    INCISION AND DRAINAGE FOOT Left 11/17/2022    Procedure: INCISION AND DRAINAGE, FOOT;  Surgeon: Sierra Augustine DPM;  Location: [facility] OR;  Service: Podiatry;  Laterality: Left;    PERCUTANEOUS TRANSLUMINAL ANGIOPLASTY N/A 11/15/2022    Procedure: PTA (ANGIOPLASTY, PERCUTANEOUS, TRANSLUMINAL);  Surgeon: Cameron Berman MD;  Location: [facility] CATH LAB;  Service: Peripheral Vascular;  Laterality: N/A;         Family History:  Family History   Problem Relation Name Age of Onset    Heart disease Mother      Stroke Mother      Cancer Father      Diabetes Sister      Diabetes Brother         Social History:  Social History     Tobacco Use    Smoking status: Former    Smokeless tobacco: Never   Substance and Sexual Activity    Alcohol use: Yes    Drug use: No    Sexual activity: Not Currently        Review of Systems     Review of Systems   Constitutional:  Negative for chills and fever.   HENT:  Negative for congestion and sore throat.    Respiratory:  Negative for cough, choking and shortness of breath.    Cardiovascular:  Positive for leg swelling (left lower leg). Negative for chest pain.   Gastrointestinal:  Negative for nausea and vomiting.   Genitourinary:  Negative for dysuria.   Musculoskeletal:  Negative for back pain.   Skin:  Negative for rash.   Neurological:  Negative for dizziness, facial asymmetry, weakness, light-headedness and numbness.  "  Hematological:  Does not bruise/bleed easily.   All other systems reviewed and are negative.       Physical Exam     Initial Vitals [09/23/24 1123]   BP Pulse Resp Temp SpO2   115/64 91 18 98.1 °F (36.7 °C) 98 %      MAP       --          Physical Exam  Nursing Notes and Vital Signs Reviewed.  Constitutional: Patient is in no acute distress. Well-developed and well-nourished.  Head: Atraumatic. Normocephalic.  Eyes: PERRL. EOM intact. Conjunctivae are not pale. No scleral icterus.  ENT: Mucous membranes are moist. Oropharynx is clear and symmetric.    Neck: Supple. Full ROM. No lymphadenopathy.  Cardiovascular: Regular rate. Regular rhythm. No murmurs, rubs, or gallops. Distal pulses are 2+ and symmetric.  Pulmonary/Chest: No respiratory distress. Clear to auscultation bilaterally. No wheezing or rales.  Abdominal: Soft and non-distended.  There is no tenderness.  No rebound, guarding, or rigidity. Good bowel sounds.  Genitourinary: No CVA tenderness  Musculoskeletal: Moves all extremities. Amputation of the left great toe. No signs of active infection. 1-2+ edema to left distal foot. And leg No overlying concerns of infection. No erythema or warmth.  Skin: Warm and dry.  Neurological:  Alert, awake, and appropriate.  Normal speech.  No acute focal neurological deficits are appreciated.  Psychiatric: Normal affect. Good eye contact. Appropriate in content.     ED Course   Procedures  ED Vital Signs:  Vitals:    09/23/24 1123 09/23/24 1235 09/23/24 1315 09/23/24 1400   BP: 115/64  (!) 165/77 (!) 144/77   Pulse: 91  75 72   Resp: 18  18 18   Temp: 98.1 °F (36.7 °C) 98.1 °F (36.7 °C) 98.1 °F (36.7 °C) 98.1 °F (36.7 °C)   TempSrc: Oral      SpO2: 98%  98% 98%   Weight: 115.7 kg (255 lb 1.2 oz)      Height:        09/23/24 1441 09/23/24 1444   BP: (!) 144/77 (!) 144/77   Pulse: 72 72   Resp: 18 18   Temp: 98.1 °F (36.7 °C) 98.1 °F (36.7 °C)   TempSrc: Oral Oral   SpO2:  98%   Weight:  115.7 kg (255 lb)   Height:  6' 3" " (1.905 m)       Abnormal Lab Results:  Labs Reviewed - No data to display         Imaging Results:  Imaging Results              US Lower Extremity Veins Left (Final result)  Result time 09/23/24 13:49:34      Final result by Nghia Bunch MD (09/23/24 13:49:34)                   Impression:      No evidence of deep venous thrombosis in the left lower extremity.      Electronically signed by: Nghia Bunch MD  Date:    09/23/2024  Time:    13:49               Narrative:    EXAMINATION:  US LOWER EXTREMITY VEINS LEFT    CLINICAL HISTORY:  Other specified soft tissue disorders    TECHNIQUE:  Duplex and color flow Doppler evaluation and graded compression of the left lower extremity veins was performed.    COMPARISON:  None    FINDINGS:  Left thigh veins: The common femoral, femoral, popliteal, upper greater saphenous, and deep femoral veins are patent and free of thrombus. The veins are normally compressible and have normal phasic flow and augmentation response.    Left calf veins: The visualized calf veins are patent.    Contralateral CFV: The contralateral (right) common femoral vein is patent and free of thrombus.    Miscellaneous: None                                                The Emergency Provider reviewed the vital signs and test results, which are outlined above.     ED Discussion       2:12 PM: Reassessed pt at this time.  Discussed with pt all pertinent ED information and results. Discussed pt dx and plan of tx. Gave pt all f/u and return to the ED instructions. All questions and concerns were addressed at this time. Pt expresses understanding of information and instructions, and is comfortable with plan to discharge. Pt is stable for discharge.    I discussed with patient and/or family/caretaker that evaluation in the ED does not suggest any emergent or life threatening medical conditions requiring immediate intervention beyond what was provided in the ED, and I believe patient is safe for  discharge.  Regardless, an unremarkable evaluation in the ED does not preclude the development or presence of a serious of life threatening condition. As such, patient was instructed to return immediately for any worsening or change in current symptoms.     ED Course as of 09/24/24 2249   Mon Sep 23, 2024   1407 US negative for DVT, will refer to vascular surgery, advised to continue with compression stocking, leg elevation [AB]      ED Course User Index  [AB] Delia Preston MD     Medical Decision Making  DDX: 1. DVT 2. PVD    US negative for DVT, no concerns for infection, lab work not indicated, NVI, advised to follow up outpatient with vascular surgery.    Amount and/or Complexity of Data Reviewed  Radiology: ordered. Decision-making details documented in ED Course.                ED Medication(s):  Medications - No data to display    Discharge Medication List as of 9/23/2024  2:09 PM           Follow-up Information       [facility]. Schedule an appointment as soon as possible for a visit in 2 days.    Specialty: Vascular Surgery  Why: Return to the Emergency Room, If symptoms worsen  Contact information:  [address] [place] [id number]  [phone]                               Scribe Attestation:   Scribe #1: I performed the above scribed service and the documentation accurately describes the services I performed. I attest to the accuracy of the note.     Attending:   Physician Attestation Statement for Scribe #1: I, Delia Preston MD, personally performed the services described in this documentation, as scribed by Jimmie Pierre, in my presence, and it is both accurate and complete.           Clinical Impression       ICD-10-CM ICD-9-CM   1. Left leg swelling  M79.89 729.81   2. History of peripheral vascular disease  Z86.79 V12.59   3. History of amputation of left great toe  Z89.412 V49.71       Disposition:   Disposition: Discharged  Condition: Stable         Delia Preston MD  09/24/24 5537

## 2024-09-25 DIAGNOSIS — M79.605 PAIN IN BOTH LOWER EXTREMITIES: Primary | ICD-10-CM

## 2024-09-25 DIAGNOSIS — M79.604 PAIN IN BOTH LOWER EXTREMITIES: Primary | ICD-10-CM

## 2024-10-14 ENCOUNTER — PATIENT MESSAGE (OUTPATIENT)
Dept: VASCULAR SURGERY | Facility: CLINIC | Age: 54
End: 2024-10-14
Payer: COMMERCIAL

## (undated) DEVICE — GUIDEWIRE ANGLED .035 150CM

## (undated) DEVICE — SYR 3CC LUER LOC

## (undated) DEVICE — KIT IRR SUCTION HND PIECE

## (undated) DEVICE — PACK BASIC SETUP SC BR

## (undated) DEVICE — HEADREST ROUND DISP FOAM 9IN

## (undated) DEVICE — SOL 9P NACL IRR PIC IL

## (undated) DEVICE — ELECTRODE REM PLYHSV RETURN 9

## (undated) DEVICE — CONTAINER SPECIMEN OR STER 4OZ

## (undated) DEVICE — DRESSING VERSA-FOAM W/O TUBE

## (undated) DEVICE — SUPPORT ULNA NERVE PROTECTOR

## (undated) DEVICE — CATH ANGIO SOFT VU 5FR 65CM

## (undated) DEVICE — BLADE SURG #15 CARBON STEEL

## (undated) DEVICE — BLADE SCALP OPHTL RND TIP

## (undated) DEVICE — Device

## (undated) DEVICE — CATH TRAILBLAZER .014X15X150

## (undated) DEVICE — SHEATH FLEXOR ANSEL 5FR 90M

## (undated) DEVICE — GAUZE SPONGE 4X4 12PLY

## (undated) DEVICE — BANDAGE MATRIX HK LOOP 4IN 5YD

## (undated) DEVICE — CATH NAVICROSS STR .035X150CM

## (undated) DEVICE — SUT 4-0 ETHILON 18 PS-2

## (undated) DEVICE — GUIDEWIRE STF .035X260CM ANG

## (undated) DEVICE — GUIDEWIRE VIPERWIRE 14 335 145

## (undated) DEVICE — GLOVE SURG BIOGEL LATEX SZ 7.5

## (undated) DEVICE — DRAPE SURG W/TWL 17 5/8X23

## (undated) DEVICE — DRESSING N ADH OIL EMUL 3X3

## (undated) DEVICE — NDL SAFETY 25G X 1.5 ECLIPSE

## (undated) DEVICE — DEVICE CLOSURE MYNX GRIP 5FR

## (undated) DEVICE — DRAPE T EXTRM SURG 121X128X90

## (undated) DEVICE — CONTRAST VISIPAQUE 50ML

## (undated) DEVICE — GUIDEWIRE STD .035X260CM ANG

## (undated) DEVICE — TOWEL OR DISP STRL BLUE 4/PK

## (undated) DEVICE — NDL HYPODERMIC BLUNT 18G 1.5IN

## (undated) DEVICE — APPLICATOR CHLORAPREP ORN 26ML

## (undated) DEVICE — CANISTER VACCUUM DRSNG KCI

## (undated) DEVICE — GLOVE 8 PROTEXIS PI ORTHO

## (undated) DEVICE — SHEATH INTRODUCER 5FR 10CM

## (undated) DEVICE — GOWN POLY REINF BRTH SLV XL

## (undated) DEVICE — WIRE CHOICE PT X SUPP 014X300

## (undated) DEVICE — BANDAGE ESMARK ELASTIC ST 4X9

## (undated) DEVICE — CATH ULTRAVERSE 014 3X220X150

## (undated) DEVICE — DECANTER 6 VIAL

## (undated) DEVICE — COVER LIGHT HANDLE 80/CA

## (undated) DEVICE — DRAPE THREE-QUARTER 53X77IN

## (undated) DEVICE — BLADE SURG CARBON STEEL #10

## (undated) DEVICE — PAD CAST SPECIALIST STRL 4

## (undated) DEVICE — SPONGE DERMACEA GAUZE 4X4

## (undated) DEVICE — STOCKINET TUBULAR 1 PLY 6X60IN

## (undated) DEVICE — MANIFOLD 4 PORT

## (undated) DEVICE — SYR 10CC LUER LOCK

## (undated) DEVICE — WIRE NITREX 014 X 300

## (undated) DEVICE — DRESSING GRANUFOAM LARGE VAC